# Patient Record
Sex: MALE | Race: WHITE | NOT HISPANIC OR LATINO | Employment: OTHER | URBAN - METROPOLITAN AREA
[De-identification: names, ages, dates, MRNs, and addresses within clinical notes are randomized per-mention and may not be internally consistent; named-entity substitution may affect disease eponyms.]

---

## 2018-01-24 ENCOUNTER — OFFICE VISIT CONVERTED (OUTPATIENT)
Dept: UROLOGY | Facility: CLINIC | Age: 68
End: 2018-01-24
Attending: UROLOGY

## 2018-01-24 ENCOUNTER — CONVERSION ENCOUNTER (OUTPATIENT)
Dept: SURGERY | Facility: CLINIC | Age: 68
End: 2018-01-24

## 2018-02-12 ENCOUNTER — OFFICE VISIT CONVERTED (OUTPATIENT)
Dept: ORTHOPEDIC SURGERY | Facility: CLINIC | Age: 68
End: 2018-02-12
Attending: ORTHOPAEDIC SURGERY

## 2019-01-28 ENCOUNTER — OFFICE VISIT CONVERTED (OUTPATIENT)
Dept: ORTHOPEDIC SURGERY | Facility: CLINIC | Age: 69
End: 2019-01-28
Attending: ORTHOPAEDIC SURGERY

## 2019-07-15 ENCOUNTER — OFFICE VISIT CONVERTED (OUTPATIENT)
Dept: SURGERY | Facility: CLINIC | Age: 69
End: 2019-07-15
Attending: SURGERY

## 2019-07-29 ENCOUNTER — OFFICE VISIT CONVERTED (OUTPATIENT)
Dept: ORTHOPEDIC SURGERY | Facility: CLINIC | Age: 69
End: 2019-07-29
Attending: ORTHOPAEDIC SURGERY

## 2019-09-09 ENCOUNTER — OFFICE VISIT CONVERTED (OUTPATIENT)
Dept: ORTHOPEDIC SURGERY | Facility: CLINIC | Age: 69
End: 2019-09-09
Attending: ORTHOPAEDIC SURGERY

## 2019-09-19 ENCOUNTER — HOSPITAL ENCOUNTER (OUTPATIENT)
Dept: PERIOP | Facility: HOSPITAL | Age: 69
Setting detail: HOSPITAL OUTPATIENT SURGERY
Discharge: HOME | End: 2019-09-19
Attending: ORTHOPAEDIC SURGERY

## 2019-09-19 LAB — GLUCOSE BLD-MCNC: 136 MG/DL (ref 70–99)

## 2019-10-02 ENCOUNTER — OFFICE VISIT CONVERTED (OUTPATIENT)
Dept: ORTHOPEDIC SURGERY | Facility: CLINIC | Age: 69
End: 2019-10-02

## 2019-10-30 ENCOUNTER — CONVERSION ENCOUNTER (OUTPATIENT)
Dept: ORTHOPEDIC SURGERY | Facility: CLINIC | Age: 69
End: 2019-10-30

## 2019-10-30 ENCOUNTER — OFFICE VISIT CONVERTED (OUTPATIENT)
Dept: ORTHOPEDIC SURGERY | Facility: CLINIC | Age: 69
End: 2019-10-30

## 2019-11-18 ENCOUNTER — HOSPITAL ENCOUNTER (OUTPATIENT)
Dept: URGENT CARE | Facility: CLINIC | Age: 69
Discharge: HOME | End: 2019-11-18
Attending: FAMILY MEDICINE

## 2019-11-20 ENCOUNTER — OFFICE VISIT CONVERTED (OUTPATIENT)
Dept: ORTHOPEDIC SURGERY | Facility: CLINIC | Age: 69
End: 2019-11-20
Attending: PHYSICIAN ASSISTANT

## 2019-11-20 ENCOUNTER — CONVERSION ENCOUNTER (OUTPATIENT)
Dept: ORTHOPEDIC SURGERY | Facility: CLINIC | Age: 69
End: 2019-11-20

## 2019-11-22 ENCOUNTER — HOSPITAL ENCOUNTER (OUTPATIENT)
Dept: GENERAL RADIOLOGY | Facility: HOSPITAL | Age: 69
Discharge: HOME | End: 2019-11-22
Attending: PHYSICIAN ASSISTANT

## 2019-11-27 ENCOUNTER — OFFICE VISIT CONVERTED (OUTPATIENT)
Dept: ORTHOPEDIC SURGERY | Facility: CLINIC | Age: 69
End: 2019-11-27
Attending: PHYSICIAN ASSISTANT

## 2019-11-27 ENCOUNTER — HOSPITAL ENCOUNTER (OUTPATIENT)
Dept: CARDIOLOGY | Facility: HOSPITAL | Age: 69
Discharge: HOME | End: 2019-11-27
Attending: PHYSICIAN ASSISTANT

## 2019-12-11 ENCOUNTER — OFFICE VISIT CONVERTED (OUTPATIENT)
Dept: ORTHOPEDIC SURGERY | Facility: CLINIC | Age: 69
End: 2019-12-11
Attending: PHYSICIAN ASSISTANT

## 2020-01-22 ENCOUNTER — OFFICE VISIT CONVERTED (OUTPATIENT)
Dept: ORTHOPEDIC SURGERY | Facility: CLINIC | Age: 70
End: 2020-01-22
Attending: PHYSICIAN ASSISTANT

## 2020-03-04 ENCOUNTER — OFFICE VISIT CONVERTED (OUTPATIENT)
Dept: ORTHOPEDIC SURGERY | Facility: CLINIC | Age: 70
End: 2020-03-04
Attending: PHYSICIAN ASSISTANT

## 2020-11-23 ENCOUNTER — OFFICE VISIT CONVERTED (OUTPATIENT)
Dept: ORTHOPEDIC SURGERY | Facility: CLINIC | Age: 70
End: 2020-11-23
Attending: PHYSICIAN ASSISTANT

## 2021-05-10 NOTE — H&P
History and Physical      Patient Name: Atilio Mcdermott   Patient ID: [id number]   Sex: Male   YOB: 1950    Primary Care Provider: Otis Patrick MD   Referring Provider: Otis Patrick MD    Visit Date: November 23, 2020    Provider: Dia Malave PA-C   Location: [facility]   Location Address: [address] [place] [address]  [id number]   Location Phone: [phone]          Chief Complaint  · Right knee injury      History Of Present Illness  Atilio Mcdermott is a 70 year old /White male who presents today to [facility].      He is here for evaluation of right knee. He is s/p right TKA 10-12 years ago. He states last week, he fell and landed directly on his right knee. He states pain over anterolateral aspect, worse with kneeling and flexion.       Past Medical History  Arthritis; Bladder Disorder; Chest pain; Congestive heart failure; Diabetes; High cholesterol; Limb Pain; Limb Swelling; Neurogenic bladder; Reflux; Shortness Of Air; Stroke; Tendonitis: Shoulder, Rotator Cuff, Right         Past Surgical History  *Metal Implant; Artificial Joints/Limbs; Colonoscopy; Cyst Excision; Gallbladder; Hernia; Joint Surgery; Knee replacement, left; Knee replacement, right; Tonsilectomy; Tonsillectomy; Wrist Surgery         Medication List  aspirin oral; baclofen oral; Claritin oral; Fish Oil oral; Folbee Plus oral; Lipitor oral; metformin 500 mg oral tablet; Micardis oral; multivitamin oral; Neurontin oral; Plavix oral; Protonix oral; Restoril oral; tramadol-acetaminophen 37.5-325 mg oral tablet         Allergy List  NO KNOWN DRUG ALLERGIES       Allergies Reconciled  Family Medical History  Stroke; Heart Disease; Diabetes, unspecified type; Family history of certain chronic disabling diseases; arthritis; Family history of Arthritis         Social History  Alcohol (Former); Alcohol Use (Never); Caffeine (Current every day); Claustophobic (Unknown); lives with spouse; .;  "Recreational Drug Use (Never); Retired.; Second hand smoke exposure (Never); Tobacco (Former)         Review of Systems  · Constitutional  o Denies  o : fever, chills, weight loss  · Cardiovascular  o Denies  o : chest pain, shortness of breath  · Gastrointestinal  o Denies  o : liver disease, heartburn, nausea, blood in stools  · Genitourinary  o Denies  o : painful urination, blood in urine  · Integument  o Denies  o : rash, itching  · Neurologic  o Denies  o : headache, weakness, loss of consciousness  · Musculoskeletal  o Admits  o : painful, swollen joints  · Psychiatric  o Denies  o : drug/alcohol addiction, anxiety, depression      Vitals  Date Time BP Position Site L\R Cuff Size HR RR TEMP (F) WT  HT  BMI kg/m2 BSA m2 O2 Sat FR L/min FiO2        11/23/2020 09:36 AM      70 - R   165lbs 0oz 5'  4\" 28.32 1.84 95 %            Physical Examination  · Constitutional  o Appearance  o : well developed, well-nourished, no obvious deformities present  · Head and Face  o Head  o :   § Inspection  § : normocephalic  o Face  o :   § Inspection  § : no facial lesions  · Eyes  o Conjunctivae  o : conjunctivae normal  o Sclerae  o : sclerae white  · Ears, Nose, Mouth and Throat  o Ears  o :   § External Ears  § : appearance within normal limits  § Hearing  § : intact  o Nose  o :   § External Nose  § : appearance normal  · Neck  o Inspection/Palpation  o : normal appearance  o Range of Motion  o : full range of motion  · Respiratory  o Respiratory Effort  o : breathing unlabored  o Inspection of Chest  o : normal appearance  o Auscultation of Lungs  o : no audible wheezing or rales  · Cardiovascular  o Heart  o : regular rate  · Gastrointestinal  o Abdominal Examination  o : soft and non-tender  · Skin and Subcutaneous Tissue  o General Inspection  o : intact, no rashes  · Psychiatric  o General  o : Alert and oriented x3  o Judgement and Insight  o : judgment and insight intact  o Mood and Affect  o : mood normal, " affect appropriate  · Right Knee  o Inspection  o : No discoloration. Small effusion of prepatellar bursa. Tender to palpation lateral border of patella. Full extension/flexion. Quadriceps and patellar tendon appear intact to palpation. Calf supple/nontender. Stable to varus/valgus stress. Neurovascularly intact.   · In Office Procedures  o View  o : AP/LATERAL/SUNRISE   o Site  o : right, knee  o Indication  o : Right knee pain   o Study  o : X-rays ordered, taken in the office, and reviewed today.  o Xray  o : Well aligned right TKA, no signs of loosening or periprosthetic fracture.   o Comparative Data  o : Comparative Data found and reviewed today           Assessment  · Pain: Knee     719.46/M25.569  · History of knee replacement     V43.65/Z96.659  · Knee contusion     924.11/S80.00XA      Plan  · Orders  o Knee (Right) [facility] Preferred View ([id number]) - 719.46/M25.569 - 11/23/2020  · Medications  o Medications have been Reconciled  o Transition of Care or Provider Policy  · Instructions  o Reviewed the patient's Past Medical, Social, and Family history as well as the ROS at today's visit, no changes.  o Call or return if worsening symptoms.  o Educated on ROM exercises to avoid stiffness and NSAIDs for swelling. Follow up PRN.  o Electronically Identified Patient Education Materials Provided Electronically            Electronically Signed by: Dia Malave PA-C -Author on November 23, 2020 10:03:10 AM

## 2021-05-14 VITALS — BODY MASS INDEX: 28.17 KG/M2 | OXYGEN SATURATION: 95 % | HEIGHT: 64 IN | HEART RATE: 69 BPM | WEIGHT: 165 LBS

## 2021-05-15 VITALS — WEIGHT: 170 LBS | BODY MASS INDEX: 29.02 KG/M2 | OXYGEN SATURATION: 97 % | HEART RATE: 78 BPM | HEIGHT: 64 IN

## 2021-05-15 VITALS — BODY MASS INDEX: 27.14 KG/M2 | HEIGHT: 64 IN | RESPIRATION RATE: 14 BRPM | WEIGHT: 159 LBS

## 2021-05-15 VITALS — WEIGHT: 170 LBS | BODY MASS INDEX: 29.02 KG/M2 | HEART RATE: 78 BPM | HEIGHT: 64 IN | OXYGEN SATURATION: 96 %

## 2021-05-15 VITALS — HEIGHT: 64 IN | BODY MASS INDEX: 29.19 KG/M2 | OXYGEN SATURATION: 95 % | HEART RATE: 62 BPM | WEIGHT: 171 LBS

## 2021-05-15 VITALS — OXYGEN SATURATION: 93 % | HEART RATE: 84 BPM | HEIGHT: 64 IN

## 2021-05-15 VITALS — HEIGHT: 64 IN | WEIGHT: 174.25 LBS | OXYGEN SATURATION: 98 % | BODY MASS INDEX: 29.75 KG/M2 | HEART RATE: 73 BPM

## 2021-05-15 VITALS — WEIGHT: 170 LBS | HEART RATE: 79 BPM | BODY MASS INDEX: 29.02 KG/M2 | OXYGEN SATURATION: 97 % | HEIGHT: 64 IN

## 2021-05-15 VITALS — BODY MASS INDEX: 29.19 KG/M2 | HEIGHT: 64 IN | HEART RATE: 65 BPM | OXYGEN SATURATION: 97 % | WEIGHT: 171 LBS

## 2021-05-15 VITALS — HEART RATE: 71 BPM | HEIGHT: 64 IN | OXYGEN SATURATION: 98 % | BODY MASS INDEX: 29.02 KG/M2 | WEIGHT: 170 LBS

## 2021-05-15 VITALS — WEIGHT: 170 LBS | HEIGHT: 64 IN | OXYGEN SATURATION: 97 % | BODY MASS INDEX: 29.02 KG/M2 | HEART RATE: 81 BPM

## 2021-05-16 VITALS — HEIGHT: 64 IN | BODY MASS INDEX: 31.58 KG/M2 | WEIGHT: 185 LBS | OXYGEN SATURATION: 95 % | HEART RATE: 74 BPM

## 2021-05-16 VITALS — WEIGHT: 170 LBS | HEIGHT: 64 IN | BODY MASS INDEX: 29.02 KG/M2 | OXYGEN SATURATION: 98 % | HEART RATE: 75 BPM

## 2021-05-16 VITALS
DIASTOLIC BLOOD PRESSURE: 72 MMHG | SYSTOLIC BLOOD PRESSURE: 116 MMHG | HEIGHT: 64 IN | BODY MASS INDEX: 30.73 KG/M2 | WEIGHT: 180 LBS

## 2021-08-09 ENCOUNTER — OFFICE VISIT (OUTPATIENT)
Dept: ORTHOPEDIC SURGERY | Facility: CLINIC | Age: 71
End: 2021-08-09

## 2021-08-09 VITALS — HEART RATE: 66 BPM | OXYGEN SATURATION: 96 % | BODY MASS INDEX: 26.98 KG/M2 | WEIGHT: 158 LBS | HEIGHT: 64 IN

## 2021-08-09 DIAGNOSIS — M16.11 PRIMARY OSTEOARTHRITIS OF RIGHT HIP: ICD-10-CM

## 2021-08-09 DIAGNOSIS — Z96.651 HISTORY OF RIGHT KNEE JOINT REPLACEMENT: Primary | ICD-10-CM

## 2021-08-09 PROCEDURE — 99212 OFFICE O/P EST SF 10 MIN: CPT | Performed by: PHYSICIAN ASSISTANT

## 2021-08-09 NOTE — PROGRESS NOTES
"Chief Complaint  Pain of the Right Hip and Pain of the Right Knee    Subjective          Atilio Olson presents to [facility] for evaluation of right hip and right knee pain. Patient states that around 3 weeks ago, he was cleaning out his horses shed when he got his boot stuck in a pallet. He states he went down and hit the ground. He states it happened so fast, he does not recall how he landed. He states the day after, he had pain in his quadriceps muscle. He is s/p right total knee arthroplasty in 2008 from external source. He states he has had knee pain and pain in his shin following his fall. He went to Urgent Care for this this past Friday and had imaging of his right knee and right hip. He states pain has became worse since the fall. He is currently taking tramadol on as needed basis due to history of issues following a stroke.     Objective   Vital Signs:   Pulse 66   Ht 162.6 cm (64\")   Wt 71.7 kg (158 lb)   SpO2 96%   BMI 27.12 kg/m²       Physical Exam  Constitutional:       Appearance: Normal appearance. He is well-developed and normal weight.   HENT:      Head: Normocephalic.      Right Ear: Hearing and external ear normal.      Left Ear: Hearing and external ear normal.      Nose: Nose normal.   Eyes:      Conjunctiva/sclera: Conjunctivae normal.   Cardiovascular:      Rate and Rhythm: Normal rate.   Pulmonary:      Effort: Pulmonary effort is normal.      Breath sounds: No wheezing or rales.   Abdominal:      Palpations: Abdomen is soft.      Tenderness: There is no abdominal tenderness.   Musculoskeletal:      Cervical back: Normal range of motion.   Skin:     Findings: No rash.   Neurological:      Mental Status: He is alert and oriented to person, place, and time.   Psychiatric:         Mood and Affect: Mood and affect normal.         Judgment: Judgment normal.     Ortho Exam  Right lower extremity: Gait is limping, full weightbearing.  No atrophy, deformity or " tenderness of the hip.  Full AROM of the hip and good strength, as compared to contralateral side.  No pain with windshield wiper test.  No pain with sit to stand transition.  Good muscle tone of the hip flexors, extensors, abductors and adductors.  Tenderness palpation of the lateral aspect of the knee.  Bruising on anterior aspect of knee.  Well-healed scar.  Tenderness along the IT band.  Sensation is intact.  Neurovascular intact.  Posterior tibialis pulse 2+.  Dorsalis pedis pulse 2+.  Good muscle tone of the quadriceps, hamstrings and ankle flexors.  Full AROM of the knee.  Well tracking patella.      Result Review :   The following data was reviewed by: VIBHA Albrecht on 08/09/2021:         Imaging Results (Most Recent)     None       XR Knee 3 View Right    Result Date: 8/5/2021  Narrative: PROCEDURE: XR KNEE 3 VW RIGHT  COMPARISON: [facility] , CR, KNEE 3 VIEWS RT, 1/28/2019, 10:33.  [facility], MR, LOWER EXTREMITY WO CONTRAST, 11/22/2019, 16:37.  INDICATIONS: Right knee pain x2 weeks after fall  FINDINGS:  A 3 compartment knee arthroplasty has been performed.  There is no evidence of loosening or infection.  Prosthetic components are in anatomic alignment.  No fracture or malalignment is evident.  There is a 2.2 cm somewhat ill-defined lytic focus in the proximal shaft of the tibia.  There is focal thinning of the anterior cortex at this site.  The finding was present on the 1/28/2019 exam suggesting a benign etiology, possibly postsurgical.  CONCLUSION:  1. Previous 3 compartment knee arthroplasty without evidence of complication 2. No joint effusion or loose body. 3. Chronic 2.2 cm lucent focus in the proximal tibial shaft is chronic and therefore favored to be benign.      Delonte Ordaz M.D.       Electronically Signed and Approved By: Delonte Ordaz M.D. on 8/05/2021 at 11:17             XR Hip With or Without Pelvis 2 - 3 View Right    Result Date:  8/5/2021  Narrative: PROCEDURE: XR HIP W OR WO PELVIS 2-3 VIEW RIGHT  COMPARISON: None  INDICATIONS: Right hip pain x2 weeks after fall.  FINDINGS:  No fracture or malalignment is identified.  Mild bilateral hip osteoarthritis is noted.  Soft tissues appear normal.  CONCLUSION:  1. Mild bilateral hip osteoarthritis      Delonte Ordaz M.D.       Electronically Signed and Approved By: Delonte Ordaz M.D. on 8/05/2021 at 11:07                      Assessment and Plan    Problem List Items Addressed This Visit        Musculoskeletal and Injuries    Primary osteoarthritis of right hip    History of right knee joint replacement - Primary          Follow Up   Return in about 5 weeks (around 9/13/2021).  Patient Instructions   Discussed treatment options with patient. Recommend use of Tylenol as needed, due to history of strokes.  IT band exercises given to assist with tenderness along the ITB.  Please call, should symptoms worsen or fail to improve prior to follow up.     Patient was given instructions and counseling regarding his condition or for health maintenance advice. Please see specific information pulled into the AVS if appropriate.

## 2021-08-09 NOTE — PATIENT INSTRUCTIONS
Discussed treatment options with patient. Recommend use of Tylenol as needed, due to history of strokes.  IT band exercises given to assist with tenderness along the ITB.  Please call, should symptoms worsen or fail to improve prior to follow up.

## 2021-09-01 ENCOUNTER — OFFICE VISIT (OUTPATIENT)
Dept: ORTHOPEDIC SURGERY | Facility: CLINIC | Age: 71
End: 2021-09-01

## 2021-09-01 VITALS — WEIGHT: 158 LBS | HEIGHT: 64 IN | BODY MASS INDEX: 26.98 KG/M2

## 2021-09-01 DIAGNOSIS — Z96.651 HISTORY OF RIGHT KNEE JOINT REPLACEMENT: Primary | ICD-10-CM

## 2021-09-01 DIAGNOSIS — M16.11 PRIMARY OSTEOARTHRITIS OF RIGHT HIP: ICD-10-CM

## 2021-09-01 PROCEDURE — 99213 OFFICE O/P EST LOW 20 MIN: CPT | Performed by: PHYSICIAN ASSISTANT

## 2021-09-01 NOTE — PROGRESS NOTES
"Chief Complaint  Follow-up of the Right Knee and Follow-up of the Right Leg    Subjective          Atilio Olson presents to [facility] for follow up of right lower extremity pain. Patient was last seen in clinic on 08/09/21 following fall while cleaning out his horses shed. He states he did not have pain initially after he fell, but happened shortly after. Patient takes tramadol daily and states it has not helped with his pain. He states pain is about the same or worse from his initial visit. He states pain is mostly in the thigh when walking and goes down to his ankle. Patient went to  prior to his initial visit and had imaging of his right hip and knee with no acute findings and intact right total knee arthroplasty. He is s/p right TKA in 2008 from external source. Patient denies pain radiating into toes. He denies numbness or tingling.     Objective   Vital Signs:   Ht 162.6 cm (64\")   Wt 71.7 kg (158 lb)   BMI 27.12 kg/m²       Physical Exam  Constitutional:       Appearance: Normal appearance. Patient is well-developed and normal weight.   HENT:      Head: Normocephalic.      Right Ear: Hearing and external ear normal.      Left Ear: Hearing and external ear normal.      Nose: Nose normal.   Eyes:      Conjunctiva/sclera: Conjunctivae normal.   Cardiovascular:      Rate and Rhythm: Normal rate.   Pulmonary:      Effort: Pulmonary effort is normal.      Breath sounds: No wheezing or rales.   Abdominal:      Palpations: Abdomen is soft.      Tenderness: There is no abdominal tenderness.   Musculoskeletal:      Cervical back: Normal range of motion.   Skin:     Findings: No rash.   Neurological:      Mental Status: Patient is alert and oriented to person, place, and time.   Psychiatric:         Mood and Affect: Mood and affect normal.         Judgment: Judgment normal.     Ortho Exam  Right lower extremity: Sensation is intact.  Neurovascular intact.  Posterior tibialis pulse " 2+.  Dorsalis pedis pulse 2+.  Full weightbearing with mildly limping gait.  Scars present on of the knee.  Tenderness palpation of the medial aspect of knee and along the quadriceps.  Full active range of motion of the hip with flexion, extension, abduction, IR/ER.  Pain elicited with abduction.  Active range of motion of the knee is -3 215 degrees of flexion.  Stable to varus and valgus stress.  Patella is well tracking.  Good muscle tone of the hip extensors, flexors, abductors, quadriceps and hamstrings muscles.  Normal plantar dorsiflexion.  Good muscle tone ankle flexors.    Result Review :   The following data was reviewed by: VIBHA Albrecht on 09/01/2021:         Imaging Results (Most Recent)     None                Assessment and Plan    Problem List Items Addressed This Visit        Musculoskeletal and Injuries    Primary osteoarthritis of right hip    Relevant Orders    Ambulatory Referral to Physical Therapy Evaluate and treat; Stretching, ROM, Strengthening (Completed)    History of right knee joint replacement - Primary    Relevant Orders    Ambulatory Referral to Physical Therapy Evaluate and treat; Stretching, ROM, Strengthening (Completed)          Follow Up   Return in about 6 weeks (around 10/13/2021).  Patient Instructions   Patient given prescription today for physical therapy for associated muscular / knee pain. Patient will continue use of Tramadol for pain.  Follow up in 6 weeks. Please call with any changes or concerns.     Patient was given instructions and counseling regarding his condition or for health maintenance advice. Please see specific information pulled into the AVS if appropriate.

## 2021-09-01 NOTE — PATIENT INSTRUCTIONS
Patient given prescription today for physical therapy for associated muscular / knee pain. Patient will continue use of Tramadol for pain.  Follow up in 6 weeks. Please call with any changes or concerns.

## 2021-09-20 ENCOUNTER — OFFICE VISIT (OUTPATIENT)
Dept: ORTHOPEDIC SURGERY | Facility: CLINIC | Age: 71
End: 2021-09-20

## 2021-09-20 VITALS — OXYGEN SATURATION: 93 % | HEART RATE: 89 BPM | WEIGHT: 158 LBS | HEIGHT: 64 IN | BODY MASS INDEX: 26.98 KG/M2

## 2021-09-20 DIAGNOSIS — Z96.651 HISTORY OF RIGHT KNEE JOINT REPLACEMENT: Primary | ICD-10-CM

## 2021-09-20 DIAGNOSIS — M16.11 PRIMARY OSTEOARTHRITIS OF RIGHT HIP: ICD-10-CM

## 2021-09-20 PROCEDURE — 99212 OFFICE O/P EST SF 10 MIN: CPT | Performed by: PHYSICIAN ASSISTANT

## 2021-09-20 NOTE — PROGRESS NOTES
"Chief Complaint  Pain of the Right Knee and Pain of the Right Hip    Subjective          Atilio Olson presents to [facility] for follow up of right hip and right knee pain. Patient has been following up in clinic since a fall while cleaning out his horse shed that occurred on 08/09/21. Patient has been attending physical therapy for the past three weeks. He states pain has improved some of hip and along his thigh. He states he still has constant ache along shin. Patient is s/p right TKA from external source in 2008. Note from therapy states that patient has radicular pain in RLE along with functional deficits with his ROM exercises. Patient does admit to numbness along his medial shin. He states pain is his biggest concern, mostly of the knee with weightbearing. He states his right quadriceps also tend to tighten up on him. He states modalities and exercises in his therapy has improved his pain some.     Objective   No Known Allergies    Vital Signs:   Pulse 89   Ht 162.6 cm (64\")   Wt 71.7 kg (158 lb)   SpO2 93%   BMI 27.12 kg/m²       Physical Exam  Constitutional:       Appearance: Normal appearance. Patient is well-developed and normal weight.   HENT:      Head: Normocephalic.      Right Ear: Hearing and external ear normal.      Left Ear: Hearing and external ear normal.      Nose: Nose normal.   Eyes:      Conjunctiva/sclera: Conjunctivae normal.   Cardiovascular:      Rate and Rhythm: Normal rate.   Pulmonary:      Effort: Pulmonary effort is normal.      Breath sounds: No wheezing or rales.   Abdominal:      Palpations: Abdomen is soft.      Tenderness: There is no abdominal tenderness.   Musculoskeletal:      Cervical back: Normal range of motion.   Skin:     Findings: No rash.   Neurological:      Mental Status: Patient is alert and oriented to person, place, and time.   Psychiatric:         Mood and Affect: Mood and affect normal.         Judgment: Judgment normal. "     Ortho Exam  Right lower extremity: Sensation grossly intact, neurovascular intact, posterior tibialis pulse 2+, dorsalis pedis pulse 2+.  Full weightbearing.  Mildly limping gait.  No tenderness, atrophy, swelling or discoloration of the hip.  Full passive range of motion of the knee.  Pain associated with internal rotation of the hip.  Scar present along the knee.  No atrophy, swelling, discoloration or deformity.  Tenderness on medial knee.  AROM is -3-125 degrees of flexion.  Stable to varus/valgus stress.  Good muscle tone of quadriceps, hamstrings, hip extensors, flexors and abductors.  Normal plantar and dorsiflexion.  Muscle tone of ankle flexors.  Negative straight leg raise bilaterally.    Result Review :   The following data was reviewed by: VIBHA Albrecht on 09/20/2021:         Imaging Results (Most Recent)     None                Assessment and Plan    Problem List Items Addressed This Visit        Musculoskeletal and Injuries    Primary osteoarthritis of right hip    Current Assessment & Plan     Discussed PT notes with patient in clinic today.  Patient stated pain had improved since beginning therapy.  Therapist believes that pain is associated with radiculopathy.  At this time, we will continue progression in physical therapy and reevaluate need for lumbar imaging and next visit.         History of right knee joint replacement - Primary          Follow Up   Return in about 4 weeks (around 10/18/2021).  Patient Instructions   Continue progressing function, stretching and ROM in physical therapy.   Continue with modalities in therapy.   Follow up in 4-6 weeks to reevaluate improvement.     Patient was given instructions and counseling regarding his condition or for health maintenance advice. Please see specific information pulled into the AVS if appropriate.

## 2021-09-20 NOTE — ASSESSMENT & PLAN NOTE
Discussed PT notes with patient in clinic today.  Patient stated pain had improved since beginning therapy.  Therapist believes that pain is associated with radiculopathy.  At this time, we will continue progression in physical therapy and reevaluate need for lumbar imaging and next visit.

## 2021-09-20 NOTE — PATIENT INSTRUCTIONS
Continue progressing function, stretching and ROM in physical therapy.   Continue with modalities in therapy.   Follow up in 4-6 weeks to reevaluate improvement.

## 2021-10-18 ENCOUNTER — OFFICE VISIT (OUTPATIENT)
Dept: ORTHOPEDIC SURGERY | Facility: CLINIC | Age: 71
End: 2021-10-18

## 2021-10-18 VITALS — HEART RATE: 65 BPM | OXYGEN SATURATION: 90 % | BODY MASS INDEX: 27.31 KG/M2 | HEIGHT: 64 IN | WEIGHT: 160 LBS

## 2021-10-18 DIAGNOSIS — M16.11 PRIMARY OSTEOARTHRITIS OF RIGHT HIP: ICD-10-CM

## 2021-10-18 DIAGNOSIS — Z96.651 HISTORY OF RIGHT KNEE JOINT REPLACEMENT: Primary | ICD-10-CM

## 2021-10-18 PROCEDURE — 99212 OFFICE O/P EST SF 10 MIN: CPT | Performed by: PHYSICIAN ASSISTANT

## 2021-10-18 NOTE — PATIENT INSTRUCTIONS
Patient will continue in PT, progressing strength and ROM exercises.   Continue with modalities as well.   We will follow up in 6-8 weeks to discuss further interventions if necessary.

## 2021-10-18 NOTE — PROGRESS NOTES
"Chief Complaint  Pain of the Right Knee and Pain of the Right Hip    Subjective          Atilio Olson presents to [facility] for follow-up of right hip and right knee pain.  Patient has history of right TKA from external source performed in 2008.  He presented initially in clinic on 08/09/2021, at which time he had pain of both his right hip and knee after he fell from horse shed.  He initially went to urgent care and had imaging.  He has been attending physical therapy over the course of the past several weeks.  He states over the past week he has noticed improvement in his pain.  He states that his therapist has been working a lot on his hip.  He states he still has aching of his knee, but is not constant.  He states he has some numbness from knee down his medial shin.  He denies numbness or tingling in the toes.    Objective   No Known Allergies    Vital Signs:   Pulse 65   Ht 162.6 cm (64\")   Wt 72.6 kg (160 lb)   SpO2 90%   BMI 27.46 kg/m²       Physical Exam  Constitutional:       Appearance: Normal appearance. Patient is well-developed and normal weight.   HENT:      Head: Normocephalic.      Right Ear: Hearing and external ear normal.      Left Ear: Hearing and external ear normal.      Nose: Nose normal.   Eyes:      Conjunctiva/sclera: Conjunctivae normal.   Cardiovascular:      Rate and Rhythm: Normal rate.   Pulmonary:      Effort: Pulmonary effort is normal.      Breath sounds: No wheezing or rales.   Abdominal:      Palpations: Abdomen is soft.      Tenderness: There is no abdominal tenderness.   Musculoskeletal:      Cervical back: Normal range of motion.   Skin:     Findings: No rash.   Neurological:      Mental Status: Patient is alert and oriented to person, place, and time.   Psychiatric:         Mood and Affect: Mood and affect normal.         Judgment: Judgment normal.     Ortho Exam  Right lower extremity: Sensation intact, neurovascular intact, dorsalis " pedis pulses 2+, posterior tibialis pulse 2+.  Full weightbearing, antalgic gait.  No tenderness palpation of the hip.  No atrophy, swelling or discoloration.  Well-healed scar along the knee.  Tenderness over medial proximal tibia.  AROM of the hip is full with extension, flexion, abduction and adduction.  Full internal and external rotation of the hip without pain.  Active range of motion of the knee is full extension, 120 degrees of flexion.  Stable to varus and valgus stress.  Well tracking patella.  Good muscle tone of the hip extensors, flexors, quadriceps and hamstrings muscles.  Full plantar dorsiflexion of the ankle.  Good muscle tone of the ankle flexors.    Result Review :{ Labs  Result Review  Imaging  Med Tab  Media :23}   The following data was reviewed by: VIBHA Albrecht on 10/18/2021:         Imaging Results (Most Recent)     None           PROCEDURE:  XR KNEE 3 VW RIGHT     COMPARISON: [facility] , CR, KNEE 3 VIEWS RT, 1/28/2019, 10:33.  [facility], MR, LOWER EXTREMITY WO CONTRAST, 11/22/2019, 16:37.     INDICATIONS:  Right knee pain x2 weeks after fall     FINDINGS:          A 3 compartment knee arthroplasty has been performed.  There is no evidence of loosening or   infection.  Prosthetic components are in anatomic alignment.  No fracture or malalignment is   evident.  There is a 2.2 cm somewhat ill-defined lytic focus in the proximal shaft of the tibia.    There is focal thinning of the anterior cortex at this site.  The finding was present on the   1/28/2019 exam suggesting a benign etiology, possibly postsurgical.     CONCLUSION:   1. Previous 3 compartment knee arthroplasty without evidence of complication  2. No joint effusion or loose body.  3. Chronic 2.2 cm lucent focus in the proximal tibial shaft is chronic and therefore favored to be   benign.          Assessment and Plan    Problem List Items Addressed This Visit        Musculoskeletal and  Injuries    Primary osteoarthritis of right hip    History of right knee joint replacement - Primary          Follow Up   Return in about 7 weeks (around 12/6/2021).  Patient Instructions   Patient will continue in PT, progressing strength and ROM exercises.   Continue with modalities as well.   We will follow up in 6-8 weeks to discuss further interventions if necessary.     Patient was given instructions and counseling regarding his condition or for health maintenance advice. Please see specific information pulled into the AVS if appropriate.

## 2021-12-06 ENCOUNTER — OFFICE VISIT (OUTPATIENT)
Dept: ORTHOPEDIC SURGERY | Facility: CLINIC | Age: 71
End: 2021-12-06

## 2021-12-06 VITALS — HEIGHT: 64 IN | BODY MASS INDEX: 27.31 KG/M2 | WEIGHT: 160 LBS

## 2021-12-06 DIAGNOSIS — M16.11 PRIMARY OSTEOARTHRITIS OF RIGHT HIP: ICD-10-CM

## 2021-12-06 DIAGNOSIS — Z96.651 HISTORY OF RIGHT KNEE JOINT REPLACEMENT: Primary | ICD-10-CM

## 2021-12-06 PROCEDURE — 99212 OFFICE O/P EST SF 10 MIN: CPT | Performed by: PHYSICIAN ASSISTANT

## 2021-12-06 NOTE — PROGRESS NOTES
"Chief Complaint  Follow-up of the Right Knee and Follow-up of the Right Hip    Subjective          Atilio Olson presents to [facility] for follow-up of the right hip and right knee pain.  Patient has history of right total knee arthroplasty from external source performed in 2008.  He began having pain of his right hip and right knee after a fall from a horse shed back in August.  Patient has been going to physical therapy over the past several weeks for pain and tightness of his IT band.  Patient states that his pain of his hip and knee \"left as fast as it came\".  He states he is no longer limping and no longer feels pain of his hip or knee.  Patient is pleased with his outcome.    Objective   No Known Allergies    Vital Signs:   Ht 162.6 cm (64\")   Wt 72.6 kg (160 lb)   BMI 27.46 kg/m²       Physical Exam  Constitutional:       Appearance: Normal appearance. Patient is well-developed and normal weight.   HENT:      Head: Normocephalic.      Right Ear: Hearing and external ear normal.      Left Ear: Hearing and external ear normal.      Nose: Nose normal.   Eyes:      Conjunctiva/sclera: Conjunctivae normal.   Cardiovascular:      Rate and Rhythm: Normal rate.   Pulmonary:      Effort: Pulmonary effort is normal.      Breath sounds: No wheezing or rales.   Abdominal:      Palpations: Abdomen is soft.      Tenderness: There is no abdominal tenderness.   Musculoskeletal:      Cervical back: Normal range of motion.   Skin:     Findings: No rash.   Neurological:      Mental Status: Patient is alert and oriented to person, place, and time.   Psychiatric:         Mood and Affect: Mood and affect normal.         Judgment: Judgment normal.     Ortho Exam  Right lower extremity: Sensation is intact, neurovascular intact, posterior tibialis pulse 2+, calf soft nontender.  Scar well-healed along the knee.  No tenderness, swelling, discoloration or deformity.  Full hip flexion, extension, " adduction and abduction.  Good muscle tone of the hip flexors, extensors, abductors and adductors.  Full knee flexion and extension.  Stable to varus and valgus stress.  Well tracking patella.  Full plantar and dorsiflexion of the ankle.  Fully weightbearing, gait is nonantalgic.      Result Review :   The following data was reviewed by: VIBHA Albrecht on 12/06/2021:         Imaging Results (Most Recent)     None                Assessment and Plan    Problem List Items Addressed This Visit        Musculoskeletal and Injuries    Primary osteoarthritis of right hip    History of right knee joint replacement - Primary          Follow Up   Return if symptoms worsen or fail to improve.  Patient Instructions   Patient completed PT and pain has improved.     He will follow up as needed and call with any changes or concerns.     Patient was given instructions and counseling regarding his condition or for health maintenance advice. Please see specific information pulled into the AVS if appropriate.

## 2021-12-06 NOTE — PATIENT INSTRUCTIONS
Patient completed PT and pain has improved.     He will follow up as needed and call with any changes or concerns.

## 2022-07-05 ENCOUNTER — TELEPHONE (OUTPATIENT)
Dept: SURGERY | Facility: CLINIC | Age: 72
End: 2022-07-05

## 2022-07-05 NOTE — TELEPHONE ENCOUNTER
Provider: RAMANDEEP BUI    Caller: TRISHA MILLSIN    Phone Number: [phone]    Reason for Call: PT WANTED TO KNOW IF IT'S TIME FOR HIM TO HAVE ANOTHER COLONOSCOPY. HE THINKS HE HAD ONE IN 2017 BUT I'M UNABLE TO VIEW THE RECORDS FROM THAT YEAR IN Epic. NO RECALL IN SYSTEM EITHER. PLEASE GIVE HIM A CALL BACK TO LET HIM KNOW IF IT'S TIME TO SCHEDULE COLONOSCOPY.    When was the patient last seen: 7/2019

## 2022-12-05 ENCOUNTER — HOSPITAL ENCOUNTER (OUTPATIENT)
Dept: GENERAL RADIOLOGY | Facility: HOSPITAL | Age: 72
Discharge: HOME | End: 2022-12-05

## 2022-12-05 ENCOUNTER — TRANSCRIBE ORDERS (OUTPATIENT)
Dept: ADMINISTRATIVE | Facility: HOSPITAL | Age: 72
End: 2022-12-05

## 2022-12-05 DIAGNOSIS — M53.86 DISORDER OF LUMBAR SPINE: ICD-10-CM

## 2022-12-05 DIAGNOSIS — M53.86 DISORDER OF LUMBAR SPINE: Primary | ICD-10-CM

## 2022-12-05 DIAGNOSIS — M51.36 DEGENERATIVE DISC DISEASE, LUMBAR: ICD-10-CM

## 2022-12-05 PROCEDURE — 72114 X-RAY EXAM L-S SPINE BENDING: CPT

## 2022-12-30 ENCOUNTER — APPOINTMENT (OUTPATIENT)
Dept: MRI IMAGING | Facility: HOSPITAL | Age: 72
End: 2022-12-30

## 2023-01-05 ENCOUNTER — HOSPITAL ENCOUNTER (OUTPATIENT)
Dept: MRI IMAGING | Facility: HOSPITAL | Age: 73
Discharge: HOME | End: 2023-01-05
Admitting: INTERNAL MEDICINE
Payer: MEDICARE

## 2023-01-05 DIAGNOSIS — M51.36 DEGENERATIVE DISC DISEASE, LUMBAR: ICD-10-CM

## 2023-01-05 PROCEDURE — 72148 MRI LUMBAR SPINE W/O DYE: CPT

## 2023-02-23 ENCOUNTER — OFFICE VISIT (OUTPATIENT)
Dept: NEUROSURGERY | Facility: CLINIC | Age: 73
End: 2023-02-23
Payer: MEDICARE

## 2023-02-23 VITALS
BODY MASS INDEX: 29.35 KG/M2 | WEIGHT: 171.9 LBS | DIASTOLIC BLOOD PRESSURE: 66 MMHG | HEIGHT: 64 IN | SYSTOLIC BLOOD PRESSURE: 136 MMHG

## 2023-02-23 DIAGNOSIS — M51.36 DDD (DEGENERATIVE DISC DISEASE), LUMBAR: ICD-10-CM

## 2023-02-23 DIAGNOSIS — M48.062 SPINAL STENOSIS OF LUMBAR REGION WITH NEUROGENIC CLAUDICATION: Primary | ICD-10-CM

## 2023-02-23 PROCEDURE — 99204 OFFICE O/P NEW MOD 45 MIN: CPT | Performed by: NEUROLOGICAL SURGERY

## 2023-02-23 NOTE — PROGRESS NOTES
"Chief Complaint  Back Pain    Subjective          Atilio Olson who is a 72 y.o. year old male who presents to [facility] for Evaluation of the Spine.     The patient complains of pain located in the lumbar spine.  Patients states the pain has been present for 6 months.  The pain came on gradually.  The pain scale level is 7.  The pain radiates into the bilateral legs and feet, left greater than right. The back is about equal to the leg.  The pain is waxing/waning and described as burning.  The pain is worse at nighttime. Patient states laying on his back and walking on uneven surfaces makes the pain worse.  Patient states hot baths makes the pain better.    Associated Symptoms Include: Numbness into the bilateral legs (intermittent)  Conservative Interventions Include: NSAIDs, muscle relaxants and pain medication    Was this the result of an injury or accident?: No    History of Previous Spinal Surgery?: No    This patient  reports that he has been smoking cigarettes. He has a 15.00 pack-year smoking history. He has never used smokeless tobacco.    Review of Systems   Musculoskeletal: Positive for arthralgias, back pain and myalgias.   Neurological: Positive for numbness.        Objective   Vital Signs:   /66 (BP Location: Right arm, Patient Position: Sitting)   Ht 162.6 cm (64\")   Wt 78 kg (171 lb 14.4 oz)   BMI 29.51 kg/m²       Physical Exam  Constitutional:       Appearance: He is normal weight.   Pulmonary:      Effort: Pulmonary effort is normal.   Musculoskeletal:         General: Tenderness (along posterior thighs) present.   Neurological:      Mental Status: He is alert.      Sensory: No sensory deficit.      Motor: No weakness.      Deep Tendon Reflexes: Reflexes normal.   Psychiatric:         Mood and Affect: Mood normal.            I personally reviewed the patient's MRI scan which shows multilevel degenerative changes with severe spinal stenosis at " L4-5.        Assessment and Plan    Diagnoses and all orders for this visit:    1. Spinal stenosis of lumbar region with neurogenic claudication (Primary)    2. DDD (degenerative disc disease), lumbar    He would like to proceed with left approach L4-5 minimally invasive laminectomy.    He will need to hold his Plavix for 7 days before and 2 days after.      Follow Up   No follow-ups on file.  Patient was given instructions and counseling regarding his condition or for health maintenance advice. Please see specific information pulled into the AVS if appropriate.

## 2023-02-24 ENCOUNTER — PATIENT ROUNDING (BHMG ONLY) (OUTPATIENT)
Dept: NEUROSURGERY | Facility: CLINIC | Age: 73
End: 2023-02-24
Payer: MEDICARE

## 2023-02-24 NOTE — PROGRESS NOTES
Mason, My name is Khadra     I am  The Practice Manager with [facility] and want to officially welcome you to our practice and ask about your recent visit.    Tell me about your visit with us. What things went well?         We're always looking for ways to make our patients' experiences even better. Do you have recommendations on ways we may improve?      Overall were you satisfied with your first visit to our practice?      I appreciate you taking the time to answer these question. Is there anything else I can do for you?     You may also receive a brief survey via e-mail or mail from [employer] about our patient satisfaction, if you could please take a moment and answer it would be helpful to the practice growth.    Thank you, and have a great day.     Khadra

## 2023-02-28 ENCOUNTER — TELEPHONE (OUTPATIENT)
Dept: NEUROSURGERY | Facility: CLINIC | Age: 73
End: 2023-02-28
Payer: MEDICARE

## 2023-03-09 PROBLEM — M48.062 SPINAL STENOSIS OF LUMBAR REGION WITH NEUROGENIC CLAUDICATION: Status: ACTIVE | Noted: 2023-03-09

## 2023-03-17 ENCOUNTER — TELEPHONE (OUTPATIENT)
Dept: NEUROSURGERY | Facility: CLINIC | Age: 73
End: 2023-03-17
Payer: MEDICARE

## 2023-03-17 RX ORDER — ASPIRIN 81 MG/1
81 TABLET ORAL DAILY
COMMUNITY

## 2023-03-17 RX ORDER — DICLOFENAC SODIUM 75 MG/1
75 TABLET, DELAYED RELEASE ORAL 2 TIMES DAILY
COMMUNITY

## 2023-03-17 RX ORDER — TRAMADOL HYDROCHLORIDE 50 MG/1
50 TABLET ORAL EVERY 8 HOURS PRN
COMMUNITY

## 2023-03-17 NOTE — PRE-PROCEDURE INSTRUCTIONS
IMPORTANT INSTRUCTIONS - PRE-ADMISSION TESTING  1. DO NOT EAT OR CHEW anything after midnight the night before your procedure.    2. You may have CLEAR liquids up to 2 hours prior to ARRIVAL time.   3. Take the following medications the morning of your procedure with JUST A SIP OF WATER: PANTOPRAZOLE, TRAMADOL, BACLOFEN, GABAPENTIN,  AND  LORATADINE    4. DO NOT BRING your medications to the hospital with you, UNLESS something has changed since your PRE-Admission Testing appointment.  5. Hold all vitamins, supplements, and NSAIDS (Non- steroidal anti-inflammatory meds) for one week prior to surgery (you MAY take Tylenol or Acetaminophen). LAST DOSE OF PLAVIX 03/16/23, B-COMPLEX-FOLIC ACID, DICLOFENAC AND FISH OIL. DR. HAMM'S OFFICE TO NOTIFY WHEN TO HOLD ASPIRIN  6. If you are diabetic, check your blood sugar the morning of your procedure. If it is less than 70 or if you are feeling symptomatic, call the following number for further instructions: [phone]_.  7. Use your inhalers/nebulizers as usual, the morning of your procedure. BRING YOUR INHALERS with you.   8. Bring your CPAP or BIPAP to hospital, ONLY IF YOU WILL BE SPENDING THE NIGHT.   9. Make sure you have a ride home and have someone who will stay with you the day of your procedure after you go home.  10. If you have any questions, please call your Pre-Admission Testing Nurse, _KAREN_ at [phone]_.   11. Per anesthesia request, do not smoke for 24 hours before your procedure or as instructed by your surgeon.

## 2023-03-17 NOTE — NURSING NOTE
NOTIFIED HAILEY (NORIS) THAT PATIENT IS CURRENTLY TAKING ASPIRIN 81 MG DAILY AND THIS WAS NOT ON HIS MED LIST FOR  THEIR OFFICE TO GIVE ANY INSTRUCTIONS. HAILEY SAID SHE WOULD SPEAK TO DR. HAMM AND NOTIFY PATIENT IF AND WHEN TO HOLD ASPIRIN PREOP.

## 2023-03-17 NOTE — TELEPHONE ENCOUNTER
with PAT called to advise that patient also takes ASA 81mg (not listed on medication list). We did receive clearance for patient to d/c plavix prior to surgery, but ASA was not addressed as we did not know patient was taking it at time of scheduling. Patient had last dose of ASA 3-16-23. Scheduled for one level [facility] 3-22-23. Ok to remain on? Or should he d/c as of today?

## 2023-03-22 ENCOUNTER — ANESTHESIA EVENT (OUTPATIENT)
Dept: PERIOP | Facility: HOSPITAL | Age: 73
End: 2023-03-22
Payer: MEDICARE

## 2023-03-22 ENCOUNTER — APPOINTMENT (OUTPATIENT)
Dept: GENERAL RADIOLOGY | Facility: HOSPITAL | Age: 73
End: 2023-03-22
Payer: MEDICARE

## 2023-03-22 ENCOUNTER — HOSPITAL ENCOUNTER (OUTPATIENT)
Facility: HOSPITAL | Age: 73
Setting detail: HOSPITAL OUTPATIENT SURGERY
Discharge: HOME | End: 2023-03-22
Attending: NEUROLOGICAL SURGERY | Admitting: NEUROLOGICAL SURGERY
Payer: MEDICARE

## 2023-03-22 ENCOUNTER — ANESTHESIA (OUTPATIENT)
Dept: PERIOP | Facility: HOSPITAL | Age: 73
End: 2023-03-22
Payer: MEDICARE

## 2023-03-22 VITALS
WEIGHT: 167.99 LBS | TEMPERATURE: 97.5 F | SYSTOLIC BLOOD PRESSURE: 112 MMHG | RESPIRATION RATE: 18 BRPM | HEIGHT: 63 IN | DIASTOLIC BLOOD PRESSURE: 56 MMHG | BODY MASS INDEX: 29.77 KG/M2 | HEART RATE: 99 BPM | OXYGEN SATURATION: 91 %

## 2023-03-22 DIAGNOSIS — M48.062 SPINAL STENOSIS OF LUMBAR REGION WITH NEUROGENIC CLAUDICATION: ICD-10-CM

## 2023-03-22 LAB
ANION GAP SERPL CALCULATED.3IONS-SCNC: 7.3 MMOL/L (ref 5–15)
BUN SERPL-MCNC: 20 MG/DL (ref 8–23)
BUN/CREAT SERPL: 23.8 (ref 7–25)
CALCIUM SPEC-SCNC: 9.7 MG/DL (ref 8.6–10.5)
CHLORIDE SERPL-SCNC: 110 MMOL/L (ref 98–107)
CO2 SERPL-SCNC: 25.7 MMOL/L (ref 22–29)
CREAT SERPL-MCNC: 0.84 MG/DL (ref 0.76–1.27)
EGFRCR SERPLBLD CKD-EPI 2021: 93.2 ML/MIN/1.73
GLUCOSE BLDC GLUCOMTR-MCNC: 156 MG/DL (ref 70–99)
GLUCOSE SERPL-MCNC: 151 MG/DL (ref 65–99)
POTASSIUM SERPL-SCNC: 4.4 MMOL/L (ref 3.5–5.2)
SODIUM SERPL-SCNC: 143 MMOL/L (ref 136–145)

## 2023-03-22 PROCEDURE — 25010000002 CEFAZOLIN IN DEXTROSE 2-4 GM/100ML-% SOLUTION: Performed by: NEUROLOGICAL SURGERY

## 2023-03-22 PROCEDURE — 99024 POSTOP FOLLOW-UP VISIT: CPT | Performed by: NEUROLOGICAL SURGERY

## 2023-03-22 PROCEDURE — 25010000002 DEXAMETHASONE PER 1 MG: Performed by: NURSE ANESTHETIST, CERTIFIED REGISTERED

## 2023-03-22 PROCEDURE — 63047 LAM FACETEC & FORAMOT LUMBAR: CPT | Performed by: NEUROLOGICAL SURGERY

## 2023-03-22 PROCEDURE — 76000 FLUOROSCOPY <1 HR PHYS/QHP: CPT

## 2023-03-22 PROCEDURE — 25010000002 MIDAZOLAM PER 1MG: Performed by: ANESTHESIOLOGY

## 2023-03-22 PROCEDURE — 25010000002 FENTANYL CITRATE (PF) 50 MCG/ML SOLUTION: Performed by: NURSE ANESTHETIST, CERTIFIED REGISTERED

## 2023-03-22 PROCEDURE — 71045 X-RAY EXAM CHEST 1 VIEW: CPT

## 2023-03-22 PROCEDURE — 80048 BASIC METABOLIC PNL TOTAL CA: CPT | Performed by: NEUROLOGICAL SURGERY

## 2023-03-22 PROCEDURE — 93005 ELECTROCARDIOGRAM TRACING: CPT | Performed by: NEUROLOGICAL SURGERY

## 2023-03-22 PROCEDURE — 25010000002 KETOROLAC TROMETHAMINE PER 15 MG: Performed by: NURSE ANESTHETIST, CERTIFIED REGISTERED

## 2023-03-22 PROCEDURE — 25010000002 ONDANSETRON PER 1 MG: Performed by: NURSE ANESTHETIST, CERTIFIED REGISTERED

## 2023-03-22 PROCEDURE — 25010000002 PROPOFOL 10 MG/ML EMULSION: Performed by: NURSE ANESTHETIST, CERTIFIED REGISTERED

## 2023-03-22 PROCEDURE — 82962 GLUCOSE BLOOD TEST: CPT

## 2023-03-22 RX ORDER — KETOROLAC TROMETHAMINE 30 MG/ML
INJECTION, SOLUTION INTRAMUSCULAR; INTRAVENOUS AS NEEDED
Status: DISCONTINUED | OUTPATIENT
Start: 2023-03-22 | End: 2023-03-22 | Stop reason: SURG

## 2023-03-22 RX ORDER — BUPIVACAINE HYDROCHLORIDE AND EPINEPHRINE 5; 5 MG/ML; UG/ML
INJECTION, SOLUTION EPIDURAL; INTRACAUDAL; PERINEURAL AS NEEDED
Status: DISCONTINUED | OUTPATIENT
Start: 2023-03-22 | End: 2023-03-22 | Stop reason: HOSPADM

## 2023-03-22 RX ORDER — EPHEDRINE SULFATE 50 MG/ML
INJECTION, SOLUTION INTRAVENOUS AS NEEDED
Status: DISCONTINUED | OUTPATIENT
Start: 2023-03-22 | End: 2023-03-22 | Stop reason: SURG

## 2023-03-22 RX ORDER — SODIUM CHLORIDE, SODIUM LACTATE, POTASSIUM CHLORIDE, CALCIUM CHLORIDE 600; 310; 30; 20 MG/100ML; MG/100ML; MG/100ML; MG/100ML
9 INJECTION, SOLUTION INTRAVENOUS CONTINUOUS PRN
Status: DISCONTINUED | OUTPATIENT
Start: 2023-03-22 | End: 2023-03-22 | Stop reason: HOSPADM

## 2023-03-22 RX ORDER — OXYCODONE HYDROCHLORIDE 5 MG/1
5 TABLET ORAL
Status: COMPLETED | OUTPATIENT
Start: 2023-03-22 | End: 2023-03-22

## 2023-03-22 RX ORDER — FENTANYL CITRATE 50 UG/ML
INJECTION, SOLUTION INTRAMUSCULAR; INTRAVENOUS AS NEEDED
Status: DISCONTINUED | OUTPATIENT
Start: 2023-03-22 | End: 2023-03-22 | Stop reason: SURG

## 2023-03-22 RX ORDER — PROMETHAZINE HYDROCHLORIDE 12.5 MG/1
25 TABLET ORAL ONCE AS NEEDED
Status: DISCONTINUED | OUTPATIENT
Start: 2023-03-22 | End: 2023-03-22 | Stop reason: HOSPADM

## 2023-03-22 RX ORDER — GLYCOPYRROLATE 0.2 MG/ML
0.2 INJECTION INTRAMUSCULAR; INTRAVENOUS
Status: COMPLETED | OUTPATIENT
Start: 2023-03-22 | End: 2023-03-22

## 2023-03-22 RX ORDER — ONDANSETRON 2 MG/ML
4 INJECTION INTRAMUSCULAR; INTRAVENOUS ONCE AS NEEDED
Status: DISCONTINUED | OUTPATIENT
Start: 2023-03-22 | End: 2023-03-22 | Stop reason: HOSPADM

## 2023-03-22 RX ORDER — PROPOFOL 10 MG/ML
VIAL (ML) INTRAVENOUS AS NEEDED
Status: DISCONTINUED | OUTPATIENT
Start: 2023-03-22 | End: 2023-03-22 | Stop reason: SURG

## 2023-03-22 RX ORDER — CEFAZOLIN SODIUM 2 G/100ML
2 INJECTION, SOLUTION INTRAVENOUS ONCE
Status: COMPLETED | OUTPATIENT
Start: 2023-03-22 | End: 2023-03-22

## 2023-03-22 RX ORDER — MIDAZOLAM HYDROCHLORIDE 2 MG/2ML
2 INJECTION, SOLUTION INTRAMUSCULAR; INTRAVENOUS ONCE
Status: COMPLETED | OUTPATIENT
Start: 2023-03-22 | End: 2023-03-22

## 2023-03-22 RX ORDER — OXYCODONE HYDROCHLORIDE AND ACETAMINOPHEN 5; 325 MG/1; MG/1
1 TABLET ORAL EVERY 4 HOURS PRN
Qty: 25 TABLET | Refills: 0 | Status: SHIPPED | OUTPATIENT
Start: 2023-03-22

## 2023-03-22 RX ORDER — ACETAMINOPHEN 500 MG
1000 TABLET ORAL ONCE
Status: COMPLETED | OUTPATIENT
Start: 2023-03-22 | End: 2023-03-22

## 2023-03-22 RX ORDER — KETAMINE HCL IN NACL, ISO-OSM 100MG/10ML
SYRINGE (ML) INJECTION AS NEEDED
Status: DISCONTINUED | OUTPATIENT
Start: 2023-03-22 | End: 2023-03-22 | Stop reason: SURG

## 2023-03-22 RX ORDER — ONDANSETRON 2 MG/ML
INJECTION INTRAMUSCULAR; INTRAVENOUS AS NEEDED
Status: DISCONTINUED | OUTPATIENT
Start: 2023-03-22 | End: 2023-03-22 | Stop reason: SURG

## 2023-03-22 RX ORDER — DEXAMETHASONE SODIUM PHOSPHATE 4 MG/ML
INJECTION, SOLUTION INTRA-ARTICULAR; INTRALESIONAL; INTRAMUSCULAR; INTRAVENOUS; SOFT TISSUE AS NEEDED
Status: DISCONTINUED | OUTPATIENT
Start: 2023-03-22 | End: 2023-03-22 | Stop reason: SURG

## 2023-03-22 RX ORDER — LIDOCAINE HYDROCHLORIDE 20 MG/ML
INJECTION, SOLUTION EPIDURAL; INFILTRATION; INTRACAUDAL; PERINEURAL AS NEEDED
Status: DISCONTINUED | OUTPATIENT
Start: 2023-03-22 | End: 2023-03-22 | Stop reason: SURG

## 2023-03-22 RX ORDER — MAGNESIUM HYDROXIDE 1200 MG/15ML
LIQUID ORAL AS NEEDED
Status: DISCONTINUED | OUTPATIENT
Start: 2023-03-22 | End: 2023-03-22 | Stop reason: HOSPADM

## 2023-03-22 RX ORDER — PROMETHAZINE HYDROCHLORIDE 25 MG/1
25 SUPPOSITORY RECTAL ONCE AS NEEDED
Status: DISCONTINUED | OUTPATIENT
Start: 2023-03-22 | End: 2023-03-22 | Stop reason: HOSPADM

## 2023-03-22 RX ORDER — PHENYLEPHRINE HCL IN 0.9% NACL 1 MG/10 ML
SYRINGE (ML) INTRAVENOUS AS NEEDED
Status: DISCONTINUED | OUTPATIENT
Start: 2023-03-22 | End: 2023-03-22 | Stop reason: SURG

## 2023-03-22 RX ORDER — ROCURONIUM BROMIDE 10 MG/ML
INJECTION, SOLUTION INTRAVENOUS AS NEEDED
Status: DISCONTINUED | OUTPATIENT
Start: 2023-03-22 | End: 2023-03-22 | Stop reason: SURG

## 2023-03-22 RX ORDER — MEPERIDINE HYDROCHLORIDE 25 MG/ML
12.5 INJECTION INTRAMUSCULAR; INTRAVENOUS; SUBCUTANEOUS
Status: DISCONTINUED | OUTPATIENT
Start: 2023-03-22 | End: 2023-03-22 | Stop reason: HOSPADM

## 2023-03-22 RX ADMIN — FENTANYL CITRATE 50 MCG: 50 INJECTION, SOLUTION INTRAMUSCULAR; INTRAVENOUS at 09:16

## 2023-03-22 RX ADMIN — EPHEDRINE SULFATE 10 MG: 50 INJECTION INTRAVENOUS at 09:53

## 2023-03-22 RX ADMIN — LIDOCAINE HYDROCHLORIDE 100 MG: 20 INJECTION, SOLUTION EPIDURAL; INFILTRATION; INTRACAUDAL; PERINEURAL at 09:16

## 2023-03-22 RX ADMIN — ACETAMINOPHEN 1000 MG: 500 TABLET ORAL at 07:44

## 2023-03-22 RX ADMIN — MIDAZOLAM HYDROCHLORIDE 2 MG: 1 INJECTION, SOLUTION INTRAMUSCULAR; INTRAVENOUS at 08:42

## 2023-03-22 RX ADMIN — EPHEDRINE SULFATE 10 MG: 50 INJECTION INTRAVENOUS at 10:03

## 2023-03-22 RX ADMIN — CEFAZOLIN SODIUM 2 G: 2 INJECTION, SOLUTION INTRAVENOUS at 09:19

## 2023-03-22 RX ADMIN — Medication 25 MG: at 09:25

## 2023-03-22 RX ADMIN — ROCURONIUM BROMIDE 50 MG: 10 INJECTION, SOLUTION INTRAVENOUS at 09:16

## 2023-03-22 RX ADMIN — ONDANSETRON 4 MG: 2 INJECTION INTRAMUSCULAR; INTRAVENOUS at 10:20

## 2023-03-22 RX ADMIN — Medication 200 MCG: at 09:31

## 2023-03-22 RX ADMIN — SODIUM CHLORIDE, POTASSIUM CHLORIDE, SODIUM LACTATE AND CALCIUM CHLORIDE 9 ML/HR: 600; 310; 30; 20 INJECTION, SOLUTION INTRAVENOUS at 07:43

## 2023-03-22 RX ADMIN — Medication 200 MCG: at 09:51

## 2023-03-22 RX ADMIN — OXYCODONE HYDROCHLORIDE 5 MG: 5 TABLET ORAL at 13:34

## 2023-03-22 RX ADMIN — OXYCODONE HYDROCHLORIDE 5 MG: 5 TABLET ORAL at 12:53

## 2023-03-22 RX ADMIN — GLYCOPYRROLATE 0.2 MG: 0.2 INJECTION INTRAMUSCULAR; INTRAVENOUS at 08:42

## 2023-03-22 RX ADMIN — SODIUM CHLORIDE, POTASSIUM CHLORIDE, SODIUM LACTATE AND CALCIUM CHLORIDE: 600; 310; 30; 20 INJECTION, SOLUTION INTRAVENOUS at 09:51

## 2023-03-22 RX ADMIN — SODIUM CHLORIDE, POTASSIUM CHLORIDE, SODIUM LACTATE AND CALCIUM CHLORIDE 9 ML/HR: 600; 310; 30; 20 INJECTION, SOLUTION INTRAVENOUS at 11:49

## 2023-03-22 RX ADMIN — KETOROLAC TROMETHAMINE 15 MG: 30 INJECTION, SOLUTION INTRAMUSCULAR; INTRAVENOUS at 10:21

## 2023-03-22 RX ADMIN — Medication 200 MCG: at 09:41

## 2023-03-22 RX ADMIN — Medication 100 MCG: at 10:03

## 2023-03-22 RX ADMIN — PROPOFOL 140 MG: 10 INJECTION, EMULSION INTRAVENOUS at 09:16

## 2023-03-22 RX ADMIN — SUGAMMADEX 200 MG: 100 INJECTION, SOLUTION INTRAVENOUS at 10:22

## 2023-03-22 RX ADMIN — DEXAMETHASONE SODIUM PHOSPHATE 4 MG: 4 INJECTION, SOLUTION INTRA-ARTICULAR; INTRALESIONAL; INTRAMUSCULAR; INTRAVENOUS; SOFT TISSUE at 09:19

## 2023-03-22 NOTE — ANESTHESIA POSTPROCEDURE EVALUATION
Patient: Atilio Olson    Procedure Summary     Date: 03/22/23 Room / Location: formerly [facility] OR 05 / formerly [facility] MAIN OR    Anesthesia Start: 0909 Anesthesia Stop: 1041    Procedure: MINIMALLY INVASIVE LUMBAR LAMINECTOMY, left approach, lumbar 4-lumbar 5 (Left: Spine Lumbar) Diagnosis:       Spinal stenosis of lumbar region with neurogenic claudication      (Spinal stenosis of lumbar region with neurogenic claudication [M48.062])    Surgeons: Ajit Staley MD Provider: Sheldon Neal MD    Anesthesia Type: general ASA Status: 3          Anesthesia Type: general    Vitals  Vitals Value Taken Time   /61 03/22/23 1046   Temp 36.1 °C (97 °F) 03/22/23 1040   Pulse 97 03/22/23 1050   Resp 14 03/22/23 1040   SpO2 90 % 03/22/23 1050   Vitals shown include unvalidated device data.        Post Anesthesia Care and Evaluation    Patient location during evaluation: bedside  Patient participation: complete - patient participated  Level of consciousness: awake  Pain score: 2  Pain management: adequate    Airway patency: patent  PONV Status: none  Cardiovascular status: acceptable and stable  Respiratory status: acceptable  Hydration status: acceptable    Comments: An Anesthesiologist personally participated in the most demanding procedures (including induction and emergence if applicable) in the anesthesia plan, monitored the course of anesthesia administration at frequent intervals and remained physically present and available for immediate diagnosis and treatment of emergencies.

## 2023-03-22 NOTE — PROGRESS NOTES
Pt reported postop weakness and numbness upon waking from anesthesia. Over the next 45 minutes or so, the numbness and strength improved. On last check, he was able to lift his legs off the bed and his sensation improved notably (some residual left leg numbness which he reported preoperatively). He denies perineal/genital numbness. Will continue to monitor. If strength doesn't continue to improve or further declines, would return to OR for wound exploration.

## 2023-03-22 NOTE — OP NOTE
LUMBAR DISCECTOMY POSTERIOR WITH METRIX  Procedure Report    Patient Name:  Atilio Olson  YOB: 1950    Date of Surgery:  3/22/2023     Indications: Lumbar spinal stenosis with neurogenic claudication    Pre-op Diagnosis:   Spinal stenosis of lumbar region with neurogenic claudication [M48.062]       Post-Op Diagnosis Codes:     * Spinal stenosis of lumbar region with neurogenic claudication [M48.062]    Procedure/CPT® Codes:  61578, 24622    Procedure(s):  MINIMALLY INVASIVE LUMBAR LAMINECTOMY, left approach, lumbar 4-lumbar 5    Staff:  Surgeon(s):  Ajit Staley MD    Assistant: Mishel Eid    Anesthesia: General    Estimated Blood Loss: 30 mL      Specimen:          Lamina and ligament (none to pathology)        Findings: Significant facet arthritis with lateral recess stenosis    Complications: No intraoperative complications    Description of Procedure: After informed consent was obtained, the patient was brought to the operating room. After the induction of adequate general endotracheal anesthesia, they were place in the prone position on the Morgan frame. All pressure points were padded. The back was prepped and draped in the typical fashion. A timeout was performed. The midline was marked and a line roughly 2.5 cms off the midline to the left was drawn. The C-arm and a spinal needle were used to localize the L4-5 level. A 2 cm incision was then made and the Boss tubular retractor system was used to dilate the tissue docking at L4-5. The level was confirmed with fluoroscopy. The microscope was brought in and used for the remainder of the case for improved magnification and illumination. The lamina was cleared of soft tissue and removed to above the insertion of the ligamentum flavum. The ligament was resected inferiorly and laterally to the edge of the nerve root.  The ligament was seen to be quite thickened and abnormal in appearance.  After undercutting the ligament and the medial  facet the ball probe passed freely along the left L5 nerve root.  The ligament was removed from its insertion L5 and the tubular retractor tilted medially to allow exposure of the patient's right lateral recess.  The dissector was used to dissect the ligament from the underlying dura and the 2 and 3 mm Kerrison punches were then used to undercut the medial facet on the patient's right.  After this the ball probe passed freely along the right L5 nerve root.  Hemostasis was obtained using combination of bipolar electrocautery as well as Gelfoam with thrombin.  Bone wax was used on the bone liters.  The wound was irrigated with normal saline.  The tubular retractor was removed and hemostasis assured in the soft tissue. The fascia was reapproximated using a 0 Vicryl and the the skin edges were reapproximated using a subcutaneous 2-0 Vicryl. The wound was dressed with mastisol, steri-strips, Telfa and Tegaderm. All sponge and needle counts were correct. The patient received a dose of preoperative antibiotics.     Assistant: Mishel Eid  was responsible for performing the following activities: Suction and Placing Dressing and their skilled assistance was necessary for the success of this case.    Ajit Staley MD     Date: 3/22/2023  Time: 10:54 EDT

## 2023-03-22 NOTE — ANESTHESIA PREPROCEDURE EVALUATION
Anesthesia Evaluation     Patient summary reviewed and Nursing notes reviewed   no history of anesthetic complications:  NPO Solid Status: > 8 hours  NPO Liquid Status: > 2 hours           Airway   Mallampati: II  TM distance: >3 FB  Neck ROM: full  No difficulty expected  Dental      Pulmonary - normal exam    breath sounds clear to auscultation  (+) a smoker Current,   Cardiovascular - normal exam  Exercise tolerance: good (4-7 METS)    ECG reviewed  Rhythm: regular  Rate: normal    (+) hypertension, CHF , DVT, hyperlipidemia,     ROS comment: EKG SR with PAC, RBBB    Off plavix 5 days    Neuro/Psych  (+) CVA (weak LEFT),    GI/Hepatic/Renal/Endo    (+)   diabetes mellitus,     Musculoskeletal     (+) back pain,   Abdominal    Substance History      OB/GYN          Other   arthritis,      ROS/Med Hx Other: >4METS, HX CVA X2 (LAST 2006) AND DVT RT ARM AT THAT TIME.   HX CHF, DM, HTN, HLD. LAST PCP OV 1/19/23.  KT                    Anesthesia Plan    ASA 3     general     (Patient understands anesthesia not responsible for dental damage.)  intravenous induction     Anesthetic plan, risks, benefits, and alternatives have been provided, discussed and informed consent has been obtained with: patient.    Plan discussed with CRNA.        CODE STATUS:

## 2023-03-22 NOTE — ADDENDUM NOTE
Addendum  created 03/22/23 1259 by Sheldon Neal MD    Order list changed, Pharmacy for encounter modified

## 2023-03-22 NOTE — DISCHARGE INSTRUCTIONS
DISCHARGE INSTRUCTIONS  DISCECTOMY/ LAMINECTOMY  [x] MINIMALLY INVASIVE      For your surgery you had:  General anesthesia (you may have a sore throat for the first 24 hours)  You may experience dizziness, drowsiness, or light-headedness for several hours following surgery  Do not stay alone today or tonight.  Limit your activity for 24 hours.  You should not drive, operate machinery, drink alcohol, or sign legally binding documents for 24 hours or while you are taking pain medication.    Activity  For the first two weeks following surgery, remain close to home and do not do any lifting, bending or strenuous activity.  Walking is the best exercise and you can increase the amount you walk as you feel like it.  Riding in a car for short distances (15-20 minutes) is okay but do not drive until you are off all narcotic medications.  If you have a sedentary job, you may resume work as soon as you feel like it (this is rarely less than one week).    Pain Control  Take your pain medicines as needed and as prescribed.  As you are feeling better, you can decrease the prescribed pain medication and take over-the-counter medications such as Tylenol or Ibuprofen.  The prescribed pain medicines tend to be constipating so it is important to eat a well-balanced diet and take a stool softener if recommended by the doctor.  Activity such as walking also helps keep your bowels regular.    Last dose of pain medication was given at:    Oxy 5 mg x2 doses @ 12:53 and 1:34   Toradol last at 10:20am. May take ibuprofen next at 4:20pm if able and needed. Incision Care  Your sutures are under the skin and will dissolve in time.  You may shower as soon as you like.    [x] You have a clear dressing, which you should remove in 3-4 days.  Beneath this dressing are steri-strips that will peel off over time.  If these steri-strips have not peeled off in 10-14 days, you may remove them.  Gently washing your incision with mild soap and rinsing  with water during your shower is all you need to do to care for the incision.  Do not scrub the incision or sit in the bathtub.  Check your incision site each day to see how it looks.  Some redness and bruising is normal for the first few days.    NOTIFY YOUR DOCTOR IF YOU EXPERIENCE ANY OF THE FOLLOWING:   You have a fever over 100.8o Fahrenheit orally  Shaking chills  Your incision is red, hot to touch, or has excessive drainage  Your incision starts to separate  Increase in bleeding or bleeding that is excessive  Nausea, vomiting and/or pain not controlled by prescribed medications  Unable to urinate in 6 hours after surgery  You may contact 's clinic at [phone] with any questions or concerns.  If unable to reach your doctor, please go to the closest emergency room.    SPECIAL INSTRUCTIONS:  1) No driving for 5-7 days and not taking narcotics.   2) May shower tomorrow no submerging incision.   3) Do not lift / push / pull more than 8-10 lbs.   4) Minimize bending/twisting at the waist and jarring activity such as lawnmower.

## 2023-04-04 LAB — QT INTERVAL: 412 MS

## 2023-04-18 ENCOUNTER — OFFICE VISIT (OUTPATIENT)
Dept: NEUROSURGERY | Facility: CLINIC | Age: 73
End: 2023-04-18
Payer: MEDICARE

## 2023-04-18 DIAGNOSIS — Z98.890 S/P LUMBAR LAMINECTOMY: Primary | ICD-10-CM

## 2023-04-18 PROCEDURE — 99024 POSTOP FOLLOW-UP VISIT: CPT | Performed by: NEUROLOGICAL SURGERY

## 2023-04-18 PROCEDURE — 1160F RVW MEDS BY RX/DR IN RCRD: CPT | Performed by: NEUROLOGICAL SURGERY

## 2023-04-18 PROCEDURE — 1159F MED LIST DOCD IN RCRD: CPT | Performed by: NEUROLOGICAL SURGERY

## 2023-04-18 NOTE — PROGRESS NOTES
Atilio Olson is a 72 y.o. male that presents with Post-op       The back is overall doing well. Decreased pain into the posterior thighs. He has left foot pain which was present preop. He is planning to have foot surgery once cleared.       Review of Systems     There were no vitals filed for this visit.     Physical Exam  Musculoskeletal:      Comments: WHSS   Neurological:      Mental Status: He is alert.             Assessment and Plan {CC Problem List  Visit Diagnosis  ROS  Review (Popup)  Health Maintenance  Quality  BestPractice  Medications  SmartSets  SnapShot Encounters  Media :23}   Problem List Items Addressed This Visit    None  Visit Diagnoses     S/P lumbar laminectomy    -  Primary        He will increase his activity as tolerated.    He will f/u with any new leg pain. I have encouraged core strengthening for the lower back pain.   Follow Up {Instructions Charge Capture  Follow-up Communications :23}   Return if symptoms worsen or fail to improve.

## 2023-04-24 ENCOUNTER — TRANSCRIBE ORDERS (OUTPATIENT)
Dept: ADMINISTRATIVE | Facility: HOSPITAL | Age: 73
End: 2023-04-24
Payer: MEDICARE

## 2023-04-24 ENCOUNTER — HOSPITAL ENCOUNTER (OUTPATIENT)
Dept: GENERAL RADIOLOGY | Facility: HOSPITAL | Age: 73
Discharge: HOME | End: 2023-04-24
Admitting: INTERNAL MEDICINE
Payer: MEDICARE

## 2023-04-24 DIAGNOSIS — Z01.818 PRE-OP EVALUATION: ICD-10-CM

## 2023-04-24 DIAGNOSIS — Z01.818 PRE-OP EVALUATION: Primary | ICD-10-CM

## 2023-04-24 PROCEDURE — 71046 X-RAY EXAM CHEST 2 VIEWS: CPT

## 2023-04-25 ENCOUNTER — TRANSCRIBE ORDERS (OUTPATIENT)
Dept: ADMINISTRATIVE | Facility: HOSPITAL | Age: 73
End: 2023-04-25
Payer: MEDICARE

## 2023-04-25 ENCOUNTER — HOSPITAL ENCOUNTER (OUTPATIENT)
Dept: CARDIOLOGY | Facility: HOSPITAL | Age: 73
Discharge: HOME | End: 2023-04-25
Admitting: INTERNAL MEDICINE
Payer: MEDICARE

## 2023-04-25 DIAGNOSIS — Z01.818 PRE-OPERATIVE CLEARANCE: Primary | ICD-10-CM

## 2023-04-25 DIAGNOSIS — Z01.818 PRE-OPERATIVE CLEARANCE: ICD-10-CM

## 2023-04-25 LAB — QT INTERVAL: 406 MS

## 2023-04-25 PROCEDURE — 93005 ELECTROCARDIOGRAM TRACING: CPT | Performed by: INTERNAL MEDICINE

## 2023-05-16 LAB — QT INTERVAL: 406 MS

## 2023-11-20 ENCOUNTER — OFFICE VISIT (OUTPATIENT)
Dept: ORTHOPEDIC SURGERY | Facility: CLINIC | Age: 73
End: 2023-11-20
Payer: MEDICARE

## 2023-11-20 VITALS
HEIGHT: 63 IN | SYSTOLIC BLOOD PRESSURE: 139 MMHG | WEIGHT: 176.6 LBS | OXYGEN SATURATION: 92 % | DIASTOLIC BLOOD PRESSURE: 76 MMHG | BODY MASS INDEX: 31.29 KG/M2 | HEART RATE: 85 BPM

## 2023-11-20 DIAGNOSIS — M70.61 TROCHANTERIC BURSITIS OF RIGHT HIP: ICD-10-CM

## 2023-11-20 DIAGNOSIS — M25.551 BILATERAL HIP PAIN: Primary | ICD-10-CM

## 2023-11-20 DIAGNOSIS — M25.552 BILATERAL HIP PAIN: Primary | ICD-10-CM

## 2023-11-20 DIAGNOSIS — M70.62 TROCHANTERIC BURSITIS OF LEFT HIP: ICD-10-CM

## 2023-11-20 PROCEDURE — 99203 OFFICE O/P NEW LOW 30 MIN: CPT | Performed by: ORTHOPAEDIC SURGERY

## 2023-11-20 PROCEDURE — 20610 DRAIN/INJ JOINT/BURSA W/O US: CPT | Performed by: ORTHOPAEDIC SURGERY

## 2023-11-20 RX ORDER — LIDOCAINE HYDROCHLORIDE 10 MG/ML
5 INJECTION, SOLUTION INFILTRATION; PERINEURAL
Status: COMPLETED | OUTPATIENT
Start: 2023-11-20 | End: 2023-11-20

## 2023-11-20 RX ORDER — TRIAMCINOLONE ACETONIDE 40 MG/ML
40 INJECTION, SUSPENSION INTRA-ARTICULAR; INTRAMUSCULAR
Status: COMPLETED | OUTPATIENT
Start: 2023-11-20 | End: 2023-11-20

## 2023-11-20 RX ADMIN — LIDOCAINE HYDROCHLORIDE 5 ML: 10 INJECTION, SOLUTION INFILTRATION; PERINEURAL at 15:16

## 2023-11-20 RX ADMIN — TRIAMCINOLONE ACETONIDE 40 MG: 40 INJECTION, SUSPENSION INTRA-ARTICULAR; INTRAMUSCULAR at 15:16

## 2023-11-20 NOTE — PROGRESS NOTES
"Chief Complaint  Initial Evaluation of the Left Hip and Initial Evaluation of the Right Hip     Subjective      Atilio Olson presents to [facility] for initial evaluation of bilateral hips. He has pain on the lateral aspect of the hip.  He has some groin pain. He had foot surgery and has been walking unevenly.  He has had no recent injury or fall.     No Known Allergies     Social History     Socioeconomic History    Marital status:    Tobacco Use    Smoking status: Some Days     Packs/day: 0.50     Years: 30.00     Additional pack years: 0.00     Total pack years: 15.00     Types: Cigarettes    Smokeless tobacco: Never    Tobacco comments:     Last was 2 weeks ago   Vaping Use    Vaping Use: Never used   Substance and Sexual Activity    Alcohol use: Not Currently     Comment: FORMER . DRINKS IN THE PAST     Drug use: Never    Sexual activity: Yes     Partners: Female        I reviewed the patient's chief complaint, history of present illness, review of systems, past medical history, surgical history, family history, social history, medications, and allergy list.     Review of Systems     Constitutional: Denies fevers, chills, weight loss  Cardiovascular: Denies chest pain, shortness of breath  Skin: Denies rashes, acute skin changes  Neurologic: Denies headache, loss of consciousness        Vital Signs:   /76 (BP Location: Right arm, Patient Position: Sitting, Cuff Size: Adult)   Pulse 85   Ht 160 cm (63\")   Wt 80.1 kg (176 lb 9.6 oz)   SpO2 92%   BMI 31.28 kg/m²          Physical Exam  General: Alert. No acute distress    Ortho Exam        BILATERAL HIPS No skin discoloration, atrophy or swelling. Positive EHL, FHL, GS, and TA. Sensation intact to all 5 nerves of the foot. Negative straight leg raise. Leg lengths appear equal. Ambulates with Antalgic gait Negative Emmett. Negative Satinder. Good strength to hamstrings, quadriceps, dorsiflexors, and plantar flexors. " Knee Extensor Mechanism  intact        Large Joint Arthrocentesis: R greater trochanteric bursa  Date/Time: 11/20/2023 3:16 PM  Consent given by: patient  Site marked: site marked  Timeout: Immediately prior to procedure a time out was called to verify the correct patient, procedure, equipment, support staff and site/side marked as required   Supporting Documentation  Indications: pain   Procedure Details  Location: hip - R greater trochanteric bursa  Needle size: 22 G  Medications administered: 5 mL lidocaine 1 %; 40 mg triamcinolone acetonide 40 MG/ML  Patient tolerance: patient tolerated the procedure well with no immediate complications      Large Joint Arthrocentesis: L greater trochanteric bursa  Date/Time: 11/20/2023 3:16 PM  Consent given by: patient  Site marked: site marked  Timeout: Immediately prior to procedure a time out was called to verify the correct patient, procedure, equipment, support staff and site/side marked as required   Supporting Documentation  Indications: pain   Procedure Details  Location: hip - L greater trochanteric bursa  Needle size: 22 G  Medications administered: 5 mL lidocaine 1 %; 40 mg triamcinolone acetonide 40 MG/ML  Patient tolerance: patient tolerated the procedure well with no immediate complications          Imaging Results (Most Recent)       Procedure Component Value Units Date/Time    XR Hips Bilateral With or Without Pelvis 3-4 View [[id number]] Resulted: 11/20/23 1450     Updated: 11/20/23 1500             Result Review :     X-Ray Report:  Bilateral hip X-Ray  Indication: Evaluation of the Bilateral hips  AP/Lateral view(s)  Findings: No arthritis.  No fracture or dislocation.   Prior studies available for comparison: no             Assessment and Plan     Diagnoses and all orders for this visit:    1. Bilateral hip pain (Primary)  -     XR Hips Bilateral With or Without Pelvis 3-4 View    2. Trochanteric bursitis of left hip    3. Trochanteric bursitis of right  hip        Discussed the treatment plan with the patient. I reviewed the X-rays that were obtained today with the patient.     Discussed the risks and benefits of conservative measures. The patient expressed understanding and wished to proceed with bilateral hip steroid injections.  He tolerated the injections well.     HEP exercises    Educated on risk of smoking/nicotine. Discussed options for smoking cessation. and Call or return if worsening symptoms.    Follow Up     PRN      Patient was given instructions and counseling regarding his condition or for health maintenance advice. Please see specific information pulled into the AVS if appropriate.     Scribed for Scotty Duran MD by Nereida Nicole MA.  11/20/23   14:45 EST    I have personally performed the services described in this document as scribed by the above individual and it is both accurate and complete. Scotty Duran MD 11/20/23

## 2023-11-30 ENCOUNTER — OFFICE VISIT (OUTPATIENT)
Dept: UROLOGY | Facility: CLINIC | Age: 73
End: 2023-11-30
Payer: MEDICARE

## 2023-11-30 VITALS
WEIGHT: 176 LBS | SYSTOLIC BLOOD PRESSURE: 149 MMHG | BODY MASS INDEX: 31.18 KG/M2 | DIASTOLIC BLOOD PRESSURE: 78 MMHG | HEIGHT: 63 IN

## 2023-11-30 DIAGNOSIS — N43.3 HYDROCELE, UNSPECIFIED HYDROCELE TYPE: Primary | ICD-10-CM

## 2023-11-30 DIAGNOSIS — R10.31 RIGHT INGUINAL PAIN: ICD-10-CM

## 2023-11-30 DIAGNOSIS — N50.811 PAIN IN RIGHT TESTICLE: ICD-10-CM

## 2023-11-30 DIAGNOSIS — N50.3 EPIDIDYMAL CYST: ICD-10-CM

## 2023-11-30 PROCEDURE — 99203 OFFICE O/P NEW LOW 30 MIN: CPT | Performed by: UROLOGY

## 2023-11-30 NOTE — PROGRESS NOTES
UROLOGY OFFICE H&P NOTE    Subjective   HPI  Atilio Olson is a 73 y.o. male.  Presents for evaluation of hydrocele.  Previously seen by Dr. Bonilla 6/2021 for history of neurogenic bladder.    The patient has been experiencing constant pain in his right testicle since 08/2023 or 09/2023. He had an ultrasound done, which showed an epididymal cyst measuring approximately 2.2 cm. The pain is not as intense as it was prior to the ultrasound.     The patient notes the pain is worse on the right side. His pain is worse when he lays down. He notes that the pain wakes him up at night. The patient is a stomach sleeper. He has been using ice packs.     The patient denies any major urinary complaints or symptoms.    The patient has suffered 2 strokes in the past with the last one in 2016.    The patient had surgery to the left foot reconstruction with pinning due to arthritis.  His right foot needs a similar procedure.  The patient was diagnosed with bursitis of the bilateral hips and received a steroid injection from Scotty Duran MD.      ____________  Scrotal ultrasound 10/30/2023: Right epididymal cyst 2.1 x 2 x 2.2 cm; moderate bilateral hydroceles    Medical History:  Past Medical History:   Diagnosis Date    Abnormal ECG [id number]    Acid reflux     Arthritis     Arthritis of back     Bladder disorder     Cervical disc disorder     Claustrophobia     Congestive heart failure (CHF)     UNSURE OF LAST EPISODE, NO CURRENT ISSUES, FOLL[facility]    Deep vein thrombosis 2006    right arm    Diabetes     Fracture, foot     High cholesterol     Hypertension     Limb swelling     Low back pain     Lumbosacral disc disease     Neurogenic bladder 01/18/2017    Periarthritis of shoulder     Peripheral neuropathy     Stroke     x2, last episode 2006, left side slight weakness    Tear of meniscus of knee     Tendonitis of shoulder 02/19/2018    ROTATOR CUFF RIGHT         Social History:  Social History      Socioeconomic History    Marital status:    Tobacco Use    Smoking status: Some Days     Packs/day: 0.50     Years: 30.00     Additional pack years: 0.00     Total pack years: 15.00     Types: Cigarettes    Smokeless tobacco: Never    Tobacco comments:     Last was 2 weeks ago   Vaping Use    Vaping Use: Never used   Substance and Sexual Activity    Alcohol use: Not Currently     Comment: FORMER . DRINKS IN THE PAST     Drug use: Never    Sexual activity: Yes     Partners: Female        Family History:  Family History   Problem Relation Age of Onset    Arthritis Mother     Heart disease Mother     Diabetes Mother     Stroke Father     Heart disease Father     Diabetes Father     Arthritis Father     Stroke Sister     Diabetes Sister     Diabetes Son     Malig Hyperthermia Neg Hx         Surgical History:  Past Surgical History:   Procedure Laterality Date    BACK SURGERY      COLONOSCOPY      EXCISION      CYST EXCISION    FOOT SURGERY      GALLBLADDER SURGERY      HAND SURGERY      HERNIA REPAIR      JOINT REPLACEMENT      LUMBAR DISCECTOMY Left 03/22/2023    Procedure: MINIMALLY INVASIVE LUMBAR LAMINECTOMY, left approach, lumbar 4-lumbar 5;  Surgeon: Ajit Staley MD;  Location: [facility] MAIN OR;  Service: Neurosurgery;  Laterality: Left;    OTHER SURGICAL HISTORY      METAL IMPLANT    OTHER SURGICAL HISTORY      ARTIFICAL JOINTS/LIMBS    OTHER SURGICAL HISTORY      JOINT SURGERY    REPLACEMENT TOTAL KNEE Left     REPLACEMENT TOTAL KNEE Right     X2    SHOULDER SURGERY      TONSILLECTOMY      WRIST SURGERY          Allergies:  No Known Allergies     Current Medications:  Current Outpatient Medications   Medication Sig Dispense Refill    aspirin 81 MG EC tablet Take 1 tablet by mouth Daily. DR. STALEY'S OFFICE ([facility]) TO NOTIFY PATIENT  IF AND WHEN TO HOLD MED PREOP.      atorvastatin (LIPITOR) 10 MG tablet Take 1 tablet by mouth Every Night.      B Complex-C-Folic Acid (FOLBEE PLUS PO)       baclofen  "(LIORESAL) 10 MG tablet Take 1 tablet by mouth 3 (Three) Times a Day.      clopidogrel (PLAVIX) 75 MG tablet Take 1 tablet by mouth Daily. Last dose 03/16/23  Indications: Resume on 3/24/23      diclofenac (VOLTAREN) 75 MG EC tablet Take 1 tablet by mouth 2 (Two) Times a Day.      gabapentin (NEURONTIN) 800 MG tablet Take 1 tablet by mouth 3 (Three) Times a Day.      Loratadine 10 MG capsule Take 1 capsule by mouth Daily.      losartan-HCTZ (HYZAAR) 100-12.5 combo dose Take 1 dose by mouth Daily.      metFORMIN (GLUCOPHAGE) 500 MG tablet metformin 500 mg oral tablet take 1 tablet (500 mg) by oral route 2 times per day with morning and evening meals   Active      Omega-3 Fatty Acids (fish oil) 1000 MG capsule capsule       pantoprazole (PROTONIX) 20 MG EC tablet Take 2 tablets by mouth Daily.      temazepam (RESTORIL) 15 MG capsule 1 capsule At Night As Needed.      traMADol (ULTRAM) 50 MG tablet Take 1 tablet by mouth Every 8 (Eight) Hours As Needed for Moderate Pain.       No current facility-administered medications for this visit.       Review of systems  A review of systems was performed, and positive findings are noted in the HPI.    Objective     Vital Signs:   /78   Ht 160 cm (63\")   Wt 79.8 kg (176 lb)   BMI 31.18 kg/m²       Physical exam  No acute distress, well-nourished  Awake alert and oriented  Mood normal; affect normal  Abdomen soft, nondistended, nontender, obese with suprapubic pannus  : No penile lesions; no scrotal erythema or edema; no appreciable hydrocele bilaterally, descended testis of normal size, shape, consistency; palpable right epididymal cyst which patient tolerant to manipulation; evaluation for inguinal hernia limited secondary to body habitus      Problem List:  Patient Active Problem List   Diagnosis    Primary osteoarthritis of right hip    History of right knee joint replacement       Assessment & Plan   Diagnoses and all orders for this visit:    1. Hydrocele, " unspecified hydrocele type (Primary)  -     CT Pelvis With Contrast; Future    2. Epididymal cyst  -     CT Pelvis With Contrast; Future    3. Right inguinal pain  -     CT Pelvis With Contrast; Future    4. Pain in right testicle  -     CT Pelvis With Contrast; Future      1. Right testicular pain.  - We discussed the results of his ultrasound.  Also discussed findings on physical examination.  Patient without clinically significant hydroceles not appreciable on physical exam; patient with small epididymal cyst, generally tolerant to exam, uncertain but believed to be unlikely to be source of patient's scrotal and testicular complaints    - We will obtain a CT scan of the pelvis to rule out any hernias and a further assessment of his anatomy.  - I do not see any reason from my standpoint that I would recommend any type of surgery at this point, pending CT scan result    - I would recommend potentially trying to think about more supportive underwear.  Conservative management strategies, these were discussed at length  - He could consider using a cold pack or ice pack when he is laying down at night 20 minutes on and 20 minutes off.  - He can take anti-inflammatories like Advil or Tylenol.    Follow-up in 4 to 6 weeks.  CT scan prior    All questions addressed    Transcribed from ambient dictation for Rupinder Hankins MD by Rosemary Huff.  11/30/23   14:09 EST    Patient or patient representative verbalized consent to the visit recording.  I have personally performed the services described in this document as transcribed by the above individual, and it is both accurate and complete.

## 2023-12-06 ENCOUNTER — TELEPHONE (OUTPATIENT)
Dept: UROLOGY | Facility: CLINIC | Age: 73
End: 2023-12-06
Payer: MEDICARE

## 2023-12-06 NOTE — TELEPHONE ENCOUNTER
Scheduled apt for 12/14/23 at 930am at [facility] encourage clear liquid patient is aware

## 2023-12-06 NOTE — TELEPHONE ENCOUNTER
PATIENT HAS A CT PELVIS WITH CONTRAST ORDERED BY DR. YEH.    HE WANTS TO HAVE IT AT [facility].  HE SAID HE CALLED THEM, AND THEY DON'T HAVE AN ORDER.    HE SAID IS DOUBLED OVER IN PAIN, AND IT IS GETTING WORSE EVERY DAY.  IT IS PAIN IN HIS TESTICLES ON THE RIGHT SIDE.  HE NEEDS TO HAVE THE CT OR HE IS GOING TO HAVE TO GO TO THE HOSPITAL.

## 2023-12-18 DIAGNOSIS — R10.31 RIGHT INGUINAL PAIN: ICD-10-CM

## 2023-12-18 DIAGNOSIS — N50.811 PAIN IN RIGHT TESTICLE: ICD-10-CM

## 2023-12-18 DIAGNOSIS — N43.3 HYDROCELE, UNSPECIFIED HYDROCELE TYPE: ICD-10-CM

## 2023-12-18 DIAGNOSIS — N50.3 EPIDIDYMAL CYST: ICD-10-CM

## 2024-01-03 PROBLEM — I63.9 STROKE: Status: ACTIVE | Noted: 2024-01-03

## 2024-01-03 PROBLEM — M79.89 LIMB SWELLING: Status: ACTIVE | Noted: 2024-01-03

## 2024-01-03 PROBLEM — M67.919 DISORDER OF BURSAE AND TENDONS IN SHOULDER REGION: Status: ACTIVE | Noted: 2018-02-19

## 2024-01-03 PROBLEM — I50.9 CONGESTIVE HEART FAILURE: Status: ACTIVE | Noted: 2024-01-03

## 2024-01-03 PROBLEM — R07.9 CHEST PAIN: Status: ACTIVE | Noted: 2024-01-03

## 2024-01-03 PROBLEM — E11.9 DIABETES: Status: ACTIVE | Noted: 2024-01-03

## 2024-01-03 PROBLEM — N32.9 BLADDER DISORDER: Status: ACTIVE | Noted: 2024-01-03

## 2024-01-03 PROBLEM — I10 HYPERTENSION: Status: ACTIVE | Noted: 2024-01-03

## 2024-01-03 PROBLEM — K21.9 ESOPHAGEAL REFLUX: Status: ACTIVE | Noted: 2024-01-03

## 2024-01-03 PROBLEM — E78.00 HIGH CHOLESTEROL: Status: ACTIVE | Noted: 2024-01-03

## 2024-01-03 PROBLEM — M71.9 DISORDER OF BURSAE AND TENDONS IN SHOULDER REGION: Status: ACTIVE | Noted: 2018-02-19

## 2024-01-03 PROBLEM — N31.9 NEUROGENIC BLADDER: Status: ACTIVE | Noted: 2017-01-18

## 2024-01-03 PROBLEM — M19.90 ARTHRITIS: Status: ACTIVE | Noted: 2024-01-03

## 2024-01-10 ENCOUNTER — OFFICE VISIT (OUTPATIENT)
Dept: UROLOGY | Facility: CLINIC | Age: 74
End: 2024-01-10
Payer: MEDICARE

## 2024-01-10 VITALS
WEIGHT: 169.6 LBS | RESPIRATION RATE: 16 BRPM | HEART RATE: 85 BPM | HEIGHT: 64 IN | BODY MASS INDEX: 28.95 KG/M2 | DIASTOLIC BLOOD PRESSURE: 67 MMHG | SYSTOLIC BLOOD PRESSURE: 109 MMHG

## 2024-01-10 DIAGNOSIS — N50.811 PAIN IN RIGHT TESTICLE: Primary | ICD-10-CM

## 2024-01-10 DIAGNOSIS — R10.31 RIGHT INGUINAL PAIN: ICD-10-CM

## 2024-01-10 DIAGNOSIS — R31.0 GROSS HEMATURIA: ICD-10-CM

## 2024-01-10 LAB
BACTERIA UR QL AUTO: ABNORMAL /HPF
BILIRUB BLD-MCNC: ABNORMAL MG/DL
BILIRUB UR QL STRIP: NEGATIVE
CLARITY UR: CLEAR
CLARITY, POC: CLEAR
COLOR UR: ABNORMAL
COLOR UR: ABNORMAL
EXPIRATION DATE: ABNORMAL
GLUCOSE UR STRIP-MCNC: NEGATIVE MG/DL
GLUCOSE UR STRIP-MCNC: NEGATIVE MG/DL
HGB UR QL STRIP.AUTO: NEGATIVE
HYALINE CASTS UR QL AUTO: ABNORMAL /LPF
KETONES UR QL STRIP: ABNORMAL
KETONES UR QL: ABNORMAL
LEUKOCYTE EST, POC: NEGATIVE
LEUKOCYTE ESTERASE UR QL STRIP.AUTO: ABNORMAL
Lab: ABNORMAL
NITRITE UR QL STRIP: NEGATIVE
NITRITE UR-MCNC: NEGATIVE MG/ML
PH UR STRIP.AUTO: 5.5 [PH] (ref 5–8)
PH UR: 5.5 [PH] (ref 5–8)
PROT UR QL STRIP: ABNORMAL
PROT UR STRIP-MCNC: ABNORMAL MG/DL
RBC # UR STRIP: ABNORMAL /HPF
RBC # UR STRIP: NEGATIVE /UL
REF LAB TEST METHOD: ABNORMAL
SP GR UR STRIP: 1.03 (ref 1–1.03)
SP GR UR: 1.02 (ref 1–1.03)
SQUAMOUS #/AREA URNS HPF: ABNORMAL /HPF
UROBILINOGEN UR QL STRIP: ABNORMAL
UROBILINOGEN UR QL: NORMAL
WBC # UR STRIP: ABNORMAL /HPF

## 2024-01-10 PROCEDURE — 81001 URINALYSIS AUTO W/SCOPE: CPT | Performed by: UROLOGY

## 2024-01-10 NOTE — PROGRESS NOTES
"    UROLOGY OFFICE FOLLOW-UP NOTE    Subjective   HPI  Atilio Olson is a 73 y.o. male.  Presents for evaluation of hydrocele.  Previously seen by Dr. Bonilla 6/2021 for history of neurogenic bladder.    The patient has been experiencing constant pain in his right testicle since 08/2023 or 09/2023. He had an ultrasound done, which showed an epididymal cyst measuring approximately 2.2 cm. The pain is not as intense as it was prior to the ultrasound.     The patient notes the pain is worse on the right side. His pain is worse when he lays down. He notes that the pain wakes him up at night. The patient is a stomach sleeper. He has been using ice packs.     The patient denies any major urinary complaints or symptoms.    The patient has suffered 2 strokes in the past with the last one in 2016.    The patient had surgery to the left foot reconstruction with pinning due to arthritis.  His right foot needs a similar procedure.  The patient was diagnosed with bursitis of the bilateral hips and received a steroid injection from Scotty Duran MD.    Update 1/10/2024: Presents for follow-up of right testicular and inguinal pain, CT pelvis prior.  Today mentions that after his scrotal u/s had 4 days of bright red painless hematuria with clots which resolved; didn't think to mention it since it spontaneously resolved.   Has recurred for 1 day since last visit.   \"On and off\" smoker. Denies family h/o  malignancy.   States he is living with the pain; persistent pain on right side.   No h/o stones.          ____________  CT pelvis with contrast 12/18/2023 ([facility]): Small bilateral hydroceles with apparent right epididymal cyst and or spermatocele.  Small fat-containing left inguinal hernia.  Moderately advanced lower lumbar spine degenerative disc disease with moderate to severe spinal foraminal stenosis.    Scrotal ultrasound 10/30/2023: Right epididymal cyst 2.1 x 2 x 2.2 cm; moderate bilateral hydroceles    Review of " "systems  A review of systems was performed, and positive findings are noted in the HPI.    Objective     Vital Signs:   /67   Pulse 85   Resp 16   Ht 162.6 cm (64\")   Wt 76.9 kg (169 lb 9.6 oz)   BMI 29.11 kg/m²       Physical exam  No acute distress, well-nourished  Awake alert and oriented  Mood normal; affect normal  Abdomen soft, nondistended, nontender    Problem List:  Patient Active Problem List   Diagnosis    Primary osteoarthritis of right hip    History of right knee joint replacement    Esophageal reflux    Arthritis    Bladder disorder    Chest pain    Congestive heart failure    Diabetes    Disorder of bursae and tendons in shoulder region    High cholesterol    Hypertension    Limb swelling    Neurogenic bladder    Stroke       Assessment & Plan   Diagnoses and all orders for this visit:    1. Pain in right testicle (Primary)    2. Right inguinal pain    3. Gross hematuria  -     POC Urinalysis Dipstick, Automated  -     Urinalysis With Microscopic - Urine, Clean Catch; Future  -     Urinalysis With Microscopic - Urine, Clean Catch  -     Cystoscopy; Future  -     CT Abdomen Pelvis With & Without Contrast; Future      Pelvic CT scan imaging reviewed, demonstrated discussed with patient at length.  No right-sided findings to explain his ongoing right-sided complaints.  Could consider other nonneurologic etiology; encouraged follow-up with PCP.  Again discussed conservative management strategies, possible referral to urology in [place]; patient wishes to cope with the symptoms for now.    Gross hematuria, warrants dedicated hematuria workup with triple phase CT and lower tract evaluation via cystoscopy.  Patient aware that this workup is performed to assess for underlying malignancy.  Agreeable to workup    Schedule cystoscopy,  CT scan prior  All questions addressed  "

## 2024-01-16 ENCOUNTER — TELEPHONE (OUTPATIENT)
Dept: UROLOGY | Facility: CLINIC | Age: 74
End: 2024-01-16
Payer: MEDICARE

## 2024-01-16 DIAGNOSIS — R31.0 GROSS HEMATURIA: Primary | ICD-10-CM

## 2024-01-16 RX ORDER — DIAZEPAM 10 MG/1
10 TABLET ORAL ONCE
Qty: 1 TABLET | Refills: 0 | Status: SHIPPED | OUTPATIENT
Start: 2024-01-16 | End: 2024-01-16

## 2024-01-16 NOTE — TELEPHONE ENCOUNTER
Called spoke with patient and he stated that he would like [facility] imaging have  1/26/24 at 8 am scheduled will let dr clemente know about anxiety during exam.

## 2024-01-16 NOTE — TELEPHONE ENCOUNTER
PATIENT CALLED AND SAID NO ONE HAS CALLED HIM TO SCHEDULE HIS CT YET.    HE SAID HE IS SCHEDULED FOR A PROCEDURE IN FEBRUARY.  HE IS VERY NERVOUS AND SHAKING OVER IT.  HE WANTS TO KNOW IF HE WILL BE GIVEN OR PRESCRIBED SOMETHING TO MAKE HIM GROGGY.

## 2024-01-23 ENCOUNTER — TELEPHONE (OUTPATIENT)
Dept: UROLOGY | Facility: CLINIC | Age: 74
End: 2024-01-23
Payer: MEDICARE

## 2024-01-23 NOTE — TELEPHONE ENCOUNTER
Caller: ASTRID    Relationship: [facility]    Best call back number: [phone]    What orders are you requesting (i.e. lab or imaging): CT ABDOMEN W/WO    In what timeframe would the patient need to come in: BEFORE 01/26/2024    Where will you receive your lab/imaging services: [facility]    Additional notes: FAX:[phone]

## 2024-02-01 DIAGNOSIS — R31.0 GROSS HEMATURIA: ICD-10-CM

## 2024-02-02 ENCOUNTER — APPOINTMENT (OUTPATIENT)
Dept: CT IMAGING | Facility: HOSPITAL | Age: 74
End: 2024-02-02
Payer: MEDICARE

## 2024-02-02 ENCOUNTER — APPOINTMENT (OUTPATIENT)
Dept: GENERAL RADIOLOGY | Facility: HOSPITAL | Age: 74
End: 2024-02-02
Payer: MEDICARE

## 2024-02-02 ENCOUNTER — APPOINTMENT (OUTPATIENT)
Dept: CARDIOLOGY | Facility: HOSPITAL | Age: 74
End: 2024-02-02
Payer: MEDICARE

## 2024-02-02 ENCOUNTER — HOSPITAL ENCOUNTER (INPATIENT)
Facility: HOSPITAL | Age: 74
LOS: 1 days | Discharge: HOME | End: 2024-02-02
Attending: EMERGENCY MEDICINE | Admitting: STUDENT IN AN ORGANIZED HEALTH CARE EDUCATION/TRAINING PROGRAM
Payer: MEDICARE

## 2024-02-02 ENCOUNTER — READMISSION MANAGEMENT (OUTPATIENT)
Dept: CALL CENTER | Facility: HOSPITAL | Age: 74
End: 2024-02-02
Payer: MEDICARE

## 2024-02-02 VITALS
SYSTOLIC BLOOD PRESSURE: 123 MMHG | TEMPERATURE: 98.6 F | OXYGEN SATURATION: 93 % | BODY MASS INDEX: 29.73 KG/M2 | HEART RATE: 68 BPM | WEIGHT: 174.16 LBS | DIASTOLIC BLOOD PRESSURE: 56 MMHG | RESPIRATION RATE: 18 BRPM | HEIGHT: 64 IN

## 2024-02-02 DIAGNOSIS — R07.9 CHEST PAIN, UNSPECIFIED TYPE: Primary | ICD-10-CM

## 2024-02-02 DIAGNOSIS — I20.0 UNSTABLE ANGINA: ICD-10-CM

## 2024-02-02 PROBLEM — I21.4 NSTEMI (NON-ST ELEVATED MYOCARDIAL INFARCTION): Status: RESOLVED | Noted: 2024-02-02 | Resolved: 2024-02-02

## 2024-02-02 PROBLEM — I21.4 NSTEMI (NON-ST ELEVATED MYOCARDIAL INFARCTION): Status: ACTIVE | Noted: 2024-02-02

## 2024-02-02 LAB
ALBUMIN SERPL-MCNC: 4.1 G/DL (ref 3.5–5.2)
ALBUMIN/GLOB SERPL: 1.7 G/DL
ALP SERPL-CCNC: 130 U/L (ref 39–117)
ALT SERPL W P-5'-P-CCNC: 31 U/L (ref 1–41)
ANION GAP SERPL CALCULATED.3IONS-SCNC: 12.1 MMOL/L (ref 5–15)
APTT PPP: 27.4 SECONDS (ref 78–95.9)
APTT PPP: 27.4 SECONDS (ref 78–95.9)
APTT PPP: 59.6 SECONDS (ref 78–95.9)
AST SERPL-CCNC: 20 U/L (ref 1–40)
BASOPHILS # BLD AUTO: 0.11 10*3/MM3 (ref 0–0.2)
BASOPHILS NFR BLD AUTO: 1.2 % (ref 0–1.5)
BH CV ECHO MEAS - AO MAX PG: 10.3 MMHG
BH CV ECHO MEAS - AO MEAN PG: 4.5 MMHG
BH CV ECHO MEAS - AO V2 MAX: 160.2 CM/SEC
BH CV ECHO MEAS - AO V2 VTI: 33.7 CM
BH CV ECHO MEAS - AVA(I,D): 1.71 CM2
BH CV ECHO MEAS - EDV(CUBED): 89.8 ML
BH CV ECHO MEAS - EDV(MOD-SP2): 91.5 ML
BH CV ECHO MEAS - EDV(MOD-SP4): 39.1 ML
BH CV ECHO MEAS - EF(MOD-SP2): 70.5 %
BH CV ECHO MEAS - EF(MOD-SP4): 65 %
BH CV ECHO MEAS - ESV(CUBED): 11.2 ML
BH CV ECHO MEAS - ESV(MOD-SP2): 27 ML
BH CV ECHO MEAS - ESV(MOD-SP4): 13.7 ML
BH CV ECHO MEAS - FS: 50 %
BH CV ECHO MEAS - IVS/LVPW: 0.98 CM
BH CV ECHO MEAS - IVSD: 0.9 CM
BH CV ECHO MEAS - LAT PEAK E' VEL: 14.8 CM/SEC
BH CV ECHO MEAS - LV DIASTOLIC VOL/BSA (35-75): 21.2 CM2
BH CV ECHO MEAS - LV MASS(C)D: 133.7 GRAMS
BH CV ECHO MEAS - LV MAX PG: 4.7 MMHG
BH CV ECHO MEAS - LV MEAN PG: 2.7 MMHG
BH CV ECHO MEAS - LV SYSTOLIC VOL/BSA (12-30): 7.4 CM2
BH CV ECHO MEAS - LV V1 MAX: 108 CM/SEC
BH CV ECHO MEAS - LV V1 VTI: 23.1 CM
BH CV ECHO MEAS - LVIDD: 4.5 CM
BH CV ECHO MEAS - LVIDS: 2.24 CM
BH CV ECHO MEAS - LVOT AREA: 2.49 CM2
BH CV ECHO MEAS - LVOT DIAM: 1.78 CM
BH CV ECHO MEAS - LVPWD: 0.92 CM
BH CV ECHO MEAS - MED PEAK E' VEL: 8.5 CM/SEC
BH CV ECHO MEAS - MV A MAX VEL: 92.4 CM/SEC
BH CV ECHO MEAS - MV DEC SLOPE: 261.6 CM/SEC2
BH CV ECHO MEAS - MV DEC TIME: 0.27 SEC
BH CV ECHO MEAS - MV E MAX VEL: 69.2 CM/SEC
BH CV ECHO MEAS - MV E/A: 0.75
BH CV ECHO MEAS - PA V2 MAX: 135.7 CM/SEC
BH CV ECHO MEAS - RVDD: 4.9 CM
BH CV ECHO MEAS - SI(MOD-SP2): 35 ML/M2
BH CV ECHO MEAS - SI(MOD-SP4): 13.8 ML/M2
BH CV ECHO MEAS - SV(LVOT): 57.6 ML
BH CV ECHO MEAS - SV(MOD-SP2): 64.5 ML
BH CV ECHO MEAS - SV(MOD-SP4): 25.4 ML
BH CV ECHO MEAS - TAPSE (>1.6): 2.8 CM
BH CV ECHO MEAS - TR MAX PG: 23.4 MMHG
BH CV ECHO MEAS - TR MAX VEL: 241.6 CM/SEC
BH CV ECHO MEASUREMENTS AVERAGE E/E' RATIO: 5.94
BILIRUB SERPL-MCNC: 0.3 MG/DL (ref 0–1.2)
BUN SERPL-MCNC: 21 MG/DL (ref 8–23)
BUN/CREAT SERPL: 17.6 (ref 7–25)
CALCIUM SPEC-SCNC: 9.8 MG/DL (ref 8.6–10.5)
CHLORIDE SERPL-SCNC: 103 MMOL/L (ref 98–107)
CK SERPL-CCNC: 67 U/L (ref 20–200)
CO2 SERPL-SCNC: 24.9 MMOL/L (ref 22–29)
CREAT SERPL-MCNC: 1.19 MG/DL (ref 0.76–1.27)
D DIMER PPP FEU-MCNC: 0.64 MCGFEU/ML (ref 0–0.72)
DEPRECATED RDW RBC AUTO: 42.7 FL (ref 37–54)
EGFRCR SERPLBLD CKD-EPI 2021: 64.9 ML/MIN/1.73
EOSINOPHIL # BLD AUTO: 0.44 10*3/MM3 (ref 0–0.4)
EOSINOPHIL NFR BLD AUTO: 4.7 % (ref 0.3–6.2)
ERYTHROCYTE [DISTWIDTH] IN BLOOD BY AUTOMATED COUNT: 13.2 % (ref 12.3–15.4)
GEN 5 2HR TROPONIN T REFLEX: 27 NG/L
GLOBULIN UR ELPH-MCNC: 2.4 GM/DL
GLUCOSE BLDC GLUCOMTR-MCNC: 133 MG/DL (ref 70–99)
GLUCOSE BLDC GLUCOMTR-MCNC: 137 MG/DL (ref 70–99)
GLUCOSE SERPL-MCNC: 125 MG/DL (ref 65–99)
HCT VFR BLD AUTO: 38.9 % (ref 37.5–51)
HGB BLD-MCNC: 13 G/DL (ref 13–17.7)
HOLD SPECIMEN: NORMAL
HOLD SPECIMEN: NORMAL
IMM GRANULOCYTES # BLD AUTO: 0.02 10*3/MM3 (ref 0–0.05)
IMM GRANULOCYTES NFR BLD AUTO: 0.2 % (ref 0–0.5)
INR PPP: 0.93 (ref 0.86–1.15)
INR PPP: 0.99 (ref 0.86–1.15)
LEFT ATRIUM VOLUME INDEX: 17.1 ML/M2
LIPASE SERPL-CCNC: 127 U/L (ref 13–60)
LYMPHOCYTES # BLD AUTO: 2.32 10*3/MM3 (ref 0.7–3.1)
LYMPHOCYTES NFR BLD AUTO: 24.8 % (ref 19.6–45.3)
MAGNESIUM SERPL-MCNC: 2.2 MG/DL (ref 1.6–2.4)
MCH RBC QN AUTO: 29.3 PG (ref 26.6–33)
MCHC RBC AUTO-ENTMCNC: 33.4 G/DL (ref 31.5–35.7)
MCV RBC AUTO: 87.8 FL (ref 79–97)
MONOCYTES # BLD AUTO: 0.8 10*3/MM3 (ref 0.1–0.9)
MONOCYTES NFR BLD AUTO: 8.5 % (ref 5–12)
NEUTROPHILS NFR BLD AUTO: 5.67 10*3/MM3 (ref 1.7–7)
NEUTROPHILS NFR BLD AUTO: 60.6 % (ref 42.7–76)
NRBC BLD AUTO-RTO: 0 /100 WBC (ref 0–0.2)
NT-PROBNP SERPL-MCNC: <36 PG/ML (ref 0–900)
PLATELET # BLD AUTO: 332 10*3/MM3 (ref 140–450)
PMV BLD AUTO: 9.3 FL (ref 6–12)
POTASSIUM SERPL-SCNC: 4.1 MMOL/L (ref 3.5–5.2)
PROT SERPL-MCNC: 6.5 G/DL (ref 6–8.5)
PROTHROMBIN TIME: 12.6 SECONDS (ref 11.8–14.9)
PROTHROMBIN TIME: 13.3 SECONDS (ref 11.8–14.9)
RBC # BLD AUTO: 4.43 10*6/MM3 (ref 4.14–5.8)
SODIUM SERPL-SCNC: 140 MMOL/L (ref 136–145)
TROPONIN T DELTA: -2 NG/L
TROPONIN T SERPL HS-MCNC: 29 NG/L
WBC NRBC COR # BLD AUTO: 9.36 10*3/MM3 (ref 3.4–10.8)
WHOLE BLOOD HOLD COAG: NORMAL
WHOLE BLOOD HOLD SPECIMEN: NORMAL

## 2024-02-02 PROCEDURE — 85379 FIBRIN DEGRADATION QUANT: CPT | Performed by: INTERNAL MEDICINE

## 2024-02-02 PROCEDURE — 99152 MOD SED SAME PHYS/QHP 5/>YRS: CPT | Performed by: INTERNAL MEDICINE

## 2024-02-02 PROCEDURE — 93010 ELECTROCARDIOGRAM REPORT: CPT | Performed by: INTERNAL MEDICINE

## 2024-02-02 PROCEDURE — 84484 ASSAY OF TROPONIN QUANT: CPT | Performed by: EMERGENCY MEDICINE

## 2024-02-02 PROCEDURE — 71045 X-RAY EXAM CHEST 1 VIEW: CPT

## 2024-02-02 PROCEDURE — 99153 MOD SED SAME PHYS/QHP EA: CPT | Performed by: INTERNAL MEDICINE

## 2024-02-02 PROCEDURE — 4A023N7 MEASUREMENT OF CARDIAC SAMPLING AND PRESSURE, LEFT HEART, PERCUTANEOUS APPROACH: ICD-10-PCS | Performed by: INTERNAL MEDICINE

## 2024-02-02 PROCEDURE — 93005 ELECTROCARDIOGRAM TRACING: CPT

## 2024-02-02 PROCEDURE — 83735 ASSAY OF MAGNESIUM: CPT | Performed by: EMERGENCY MEDICINE

## 2024-02-02 PROCEDURE — 25010000002 ONDANSETRON PER 1 MG: Performed by: EMERGENCY MEDICINE

## 2024-02-02 PROCEDURE — 71275 CT ANGIOGRAPHY CHEST: CPT

## 2024-02-02 PROCEDURE — 25510000001 IOPAMIDOL PER 1 ML: Performed by: INTERNAL MEDICINE

## 2024-02-02 PROCEDURE — B2111ZZ FLUOROSCOPY OF MULTIPLE CORONARY ARTERIES USING LOW OSMOLAR CONTRAST: ICD-10-PCS | Performed by: INTERNAL MEDICINE

## 2024-02-02 PROCEDURE — 93458 L HRT ARTERY/VENTRICLE ANGIO: CPT | Performed by: INTERNAL MEDICINE

## 2024-02-02 PROCEDURE — 25810000003 SODIUM CHLORIDE 0.9 % SOLUTION

## 2024-02-02 PROCEDURE — C1894 INTRO/SHEATH, NON-LASER: HCPCS | Performed by: INTERNAL MEDICINE

## 2024-02-02 PROCEDURE — 25010000002 MORPHINE PER 10 MG: Performed by: STUDENT IN AN ORGANIZED HEALTH CARE EDUCATION/TRAINING PROGRAM

## 2024-02-02 PROCEDURE — 25010000002 HEPARIN (PORCINE) 25000-0.45 UT/250ML-% SOLUTION: Performed by: EMERGENCY MEDICINE

## 2024-02-02 PROCEDURE — 25010000002 MIDAZOLAM PER 1MG: Performed by: INTERNAL MEDICINE

## 2024-02-02 PROCEDURE — B2151ZZ FLUOROSCOPY OF LEFT HEART USING LOW OSMOLAR CONTRAST: ICD-10-PCS | Performed by: INTERNAL MEDICINE

## 2024-02-02 PROCEDURE — 36415 COLL VENOUS BLD VENIPUNCTURE: CPT

## 2024-02-02 PROCEDURE — 93306 TTE W/DOPPLER COMPLETE: CPT | Performed by: INTERNAL MEDICINE

## 2024-02-02 PROCEDURE — 93005 ELECTROCARDIOGRAM TRACING: CPT | Performed by: STUDENT IN AN ORGANIZED HEALTH CARE EDUCATION/TRAINING PROGRAM

## 2024-02-02 PROCEDURE — 25010000002 FENTANYL CITRATE (PF) 100 MCG/2ML SOLUTION: Performed by: INTERNAL MEDICINE

## 2024-02-02 PROCEDURE — C1769 GUIDE WIRE: HCPCS | Performed by: INTERNAL MEDICINE

## 2024-02-02 PROCEDURE — 82550 ASSAY OF CK (CPK): CPT | Performed by: INTERNAL MEDICINE

## 2024-02-02 PROCEDURE — 25010000002 HEPARIN (PORCINE) PER 1000 UNITS: Performed by: INTERNAL MEDICINE

## 2024-02-02 PROCEDURE — 25510000001 IOPAMIDOL PER 1 ML: Performed by: FAMILY MEDICINE

## 2024-02-02 PROCEDURE — 93306 TTE W/DOPPLER COMPLETE: CPT

## 2024-02-02 PROCEDURE — 25010000002 MORPHINE PER 10 MG: Performed by: EMERGENCY MEDICINE

## 2024-02-02 PROCEDURE — 93005 ELECTROCARDIOGRAM TRACING: CPT | Performed by: EMERGENCY MEDICINE

## 2024-02-02 PROCEDURE — 99285 EMERGENCY DEPT VISIT HI MDM: CPT

## 2024-02-02 PROCEDURE — 80053 COMPREHEN METABOLIC PANEL: CPT | Performed by: EMERGENCY MEDICINE

## 2024-02-02 PROCEDURE — 94761 N-INVAS EAR/PLS OXIMETRY MLT: CPT

## 2024-02-02 PROCEDURE — 85610 PROTHROMBIN TIME: CPT | Performed by: EMERGENCY MEDICINE

## 2024-02-02 PROCEDURE — 85730 THROMBOPLASTIN TIME PARTIAL: CPT | Performed by: EMERGENCY MEDICINE

## 2024-02-02 PROCEDURE — 83880 ASSAY OF NATRIURETIC PEPTIDE: CPT | Performed by: EMERGENCY MEDICINE

## 2024-02-02 PROCEDURE — 99222 1ST HOSP IP/OBS MODERATE 55: CPT | Performed by: INTERNAL MEDICINE

## 2024-02-02 PROCEDURE — 25010000002 MORPHINE PER 10 MG

## 2024-02-02 PROCEDURE — 82948 REAGENT STRIP/BLOOD GLUCOSE: CPT

## 2024-02-02 PROCEDURE — 25010000002 SULFUR HEXAFLUORIDE MICROSPH 60.7-25 MG RECONSTITUTED SUSPENSION: Performed by: FAMILY MEDICINE

## 2024-02-02 PROCEDURE — 25810000003 SODIUM CHLORIDE 0.9 % SOLUTION: Performed by: INTERNAL MEDICINE

## 2024-02-02 PROCEDURE — 94799 UNLISTED PULMONARY SVC/PX: CPT

## 2024-02-02 PROCEDURE — 99239 HOSP IP/OBS DSCHRG MGMT >30: CPT | Performed by: FAMILY MEDICINE

## 2024-02-02 PROCEDURE — 99222 1ST HOSP IP/OBS MODERATE 55: CPT | Performed by: STUDENT IN AN ORGANIZED HEALTH CARE EDUCATION/TRAINING PROGRAM

## 2024-02-02 PROCEDURE — 85025 COMPLETE CBC W/AUTO DIFF WBC: CPT | Performed by: EMERGENCY MEDICINE

## 2024-02-02 PROCEDURE — 83690 ASSAY OF LIPASE: CPT | Performed by: EMERGENCY MEDICINE

## 2024-02-02 RX ORDER — HEPARIN SODIUM 1000 [USP'U]/ML
INJECTION, SOLUTION INTRAVENOUS; SUBCUTANEOUS
Status: DISCONTINUED | OUTPATIENT
Start: 2024-02-02 | End: 2024-02-02 | Stop reason: HOSPADM

## 2024-02-02 RX ORDER — NALOXONE HCL 0.4 MG/ML
0.4 VIAL (ML) INJECTION
Status: DISCONTINUED | OUTPATIENT
Start: 2024-02-02 | End: 2024-02-02

## 2024-02-02 RX ORDER — MORPHINE SULFATE 2 MG/ML
1 INJECTION, SOLUTION INTRAMUSCULAR; INTRAVENOUS EVERY 4 HOURS PRN
Status: DISCONTINUED | OUTPATIENT
Start: 2024-02-02 | End: 2024-02-02 | Stop reason: HOSPADM

## 2024-02-02 RX ORDER — MORPHINE SULFATE 2 MG/ML
1 INJECTION, SOLUTION INTRAMUSCULAR; INTRAVENOUS EVERY 4 HOURS PRN
Status: DISCONTINUED | OUTPATIENT
Start: 2024-02-02 | End: 2024-02-02

## 2024-02-02 RX ORDER — SODIUM CHLORIDE 9 MG/ML
100 INJECTION, SOLUTION INTRAVENOUS CONTINUOUS
Status: DISCONTINUED | OUTPATIENT
Start: 2024-02-02 | End: 2024-02-02

## 2024-02-02 RX ORDER — CLOPIDOGREL BISULFATE 75 MG/1
75 TABLET ORAL DAILY
Status: DISCONTINUED | OUTPATIENT
Start: 2024-02-02 | End: 2024-02-02 | Stop reason: HOSPADM

## 2024-02-02 RX ORDER — SODIUM CHLORIDE 9 MG/ML
40 INJECTION, SOLUTION INTRAVENOUS AS NEEDED
Status: DISCONTINUED | OUTPATIENT
Start: 2024-02-02 | End: 2024-02-02 | Stop reason: HOSPADM

## 2024-02-02 RX ORDER — ONDANSETRON 2 MG/ML
4 INJECTION INTRAMUSCULAR; INTRAVENOUS EVERY 6 HOURS PRN
Status: DISCONTINUED | OUTPATIENT
Start: 2024-02-02 | End: 2024-02-02 | Stop reason: HOSPADM

## 2024-02-02 RX ORDER — NITROGLYCERIN 0.4 MG/1
0.4 TABLET SUBLINGUAL
Status: DISCONTINUED | OUTPATIENT
Start: 2024-02-02 | End: 2024-02-02 | Stop reason: SDUPTHER

## 2024-02-02 RX ORDER — SODIUM CHLORIDE 9 MG/ML
50 INJECTION, SOLUTION INTRAVENOUS CONTINUOUS
Status: DISCONTINUED | OUTPATIENT
Start: 2024-02-02 | End: 2024-02-02

## 2024-02-02 RX ORDER — ACETAMINOPHEN 325 MG/1
650 TABLET ORAL EVERY 4 HOURS PRN
Status: DISCONTINUED | OUTPATIENT
Start: 2024-02-02 | End: 2024-02-02 | Stop reason: HOSPADM

## 2024-02-02 RX ORDER — BISACODYL 10 MG
10 SUPPOSITORY, RECTAL RECTAL DAILY PRN
Status: DISCONTINUED | OUTPATIENT
Start: 2024-02-02 | End: 2024-02-02 | Stop reason: HOSPADM

## 2024-02-02 RX ORDER — BISACODYL 5 MG/1
5 TABLET, DELAYED RELEASE ORAL DAILY PRN
Status: DISCONTINUED | OUTPATIENT
Start: 2024-02-02 | End: 2024-02-02 | Stop reason: HOSPADM

## 2024-02-02 RX ORDER — FENTANYL CITRATE 50 UG/ML
INJECTION, SOLUTION INTRAMUSCULAR; INTRAVENOUS
Status: DISCONTINUED | OUTPATIENT
Start: 2024-02-02 | End: 2024-02-02 | Stop reason: HOSPADM

## 2024-02-02 RX ORDER — MIDAZOLAM HYDROCHLORIDE 2 MG/2ML
INJECTION, SOLUTION INTRAMUSCULAR; INTRAVENOUS
Status: DISCONTINUED | OUTPATIENT
Start: 2024-02-02 | End: 2024-02-02 | Stop reason: HOSPADM

## 2024-02-02 RX ORDER — ASPIRIN 81 MG/1
324 TABLET, CHEWABLE ORAL ONCE
Status: COMPLETED | OUTPATIENT
Start: 2024-02-02 | End: 2024-02-02

## 2024-02-02 RX ORDER — MORPHINE SULFATE 2 MG/ML
2 INJECTION, SOLUTION INTRAMUSCULAR; INTRAVENOUS ONCE
Status: COMPLETED | OUTPATIENT
Start: 2024-02-02 | End: 2024-02-02

## 2024-02-02 RX ORDER — NALOXONE HCL 0.4 MG/ML
0.4 VIAL (ML) INJECTION
Status: DISCONTINUED | OUTPATIENT
Start: 2024-02-02 | End: 2024-02-02 | Stop reason: HOSPADM

## 2024-02-02 RX ORDER — POLYETHYLENE GLYCOL 3350 17 G
2 POWDER IN PACKET (EA) ORAL
Status: DISCONTINUED | OUTPATIENT
Start: 2024-02-02 | End: 2024-02-02 | Stop reason: HOSPADM

## 2024-02-02 RX ORDER — SODIUM CHLORIDE 0.9 % (FLUSH) 0.9 %
10 SYRINGE (ML) INJECTION AS NEEDED
Status: DISCONTINUED | OUTPATIENT
Start: 2024-02-02 | End: 2024-02-02 | Stop reason: HOSPADM

## 2024-02-02 RX ORDER — SODIUM CHLORIDE 9 MG/ML
INJECTION, SOLUTION INTRAVENOUS
Status: COMPLETED | OUTPATIENT
Start: 2024-02-02 | End: 2024-02-02

## 2024-02-02 RX ORDER — MORPHINE SULFATE 2 MG/ML
1 INJECTION, SOLUTION INTRAMUSCULAR; INTRAVENOUS ONCE AS NEEDED
Status: COMPLETED | OUTPATIENT
Start: 2024-02-02 | End: 2024-02-02

## 2024-02-02 RX ORDER — LIDOCAINE HYDROCHLORIDE 20 MG/ML
5 SOLUTION OROPHARYNGEAL ONCE
Status: COMPLETED | OUTPATIENT
Start: 2024-02-02 | End: 2024-02-02

## 2024-02-02 RX ORDER — ATORVASTATIN CALCIUM 40 MG/1
40 TABLET, FILM COATED ORAL NIGHTLY
Status: DISCONTINUED | OUTPATIENT
Start: 2024-02-02 | End: 2024-02-02 | Stop reason: HOSPADM

## 2024-02-02 RX ORDER — HEPARIN SODIUM 10000 [USP'U]/100ML
12 INJECTION, SOLUTION INTRAVENOUS
Status: DISCONTINUED | OUTPATIENT
Start: 2024-02-02 | End: 2024-02-02

## 2024-02-02 RX ORDER — ONDANSETRON 4 MG/1
4 TABLET, ORALLY DISINTEGRATING ORAL EVERY 6 HOURS PRN
Status: DISCONTINUED | OUTPATIENT
Start: 2024-02-02 | End: 2024-02-02 | Stop reason: HOSPADM

## 2024-02-02 RX ORDER — SODIUM CHLORIDE 0.9 % (FLUSH) 0.9 %
10 SYRINGE (ML) INJECTION EVERY 12 HOURS SCHEDULED
Status: DISCONTINUED | OUTPATIENT
Start: 2024-02-02 | End: 2024-02-02 | Stop reason: HOSPADM

## 2024-02-02 RX ORDER — ONDANSETRON 2 MG/ML
4 INJECTION INTRAMUSCULAR; INTRAVENOUS ONCE
Status: COMPLETED | OUTPATIENT
Start: 2024-02-02 | End: 2024-02-02

## 2024-02-02 RX ORDER — ASPIRIN 81 MG/1
81 TABLET ORAL DAILY
Status: DISCONTINUED | OUTPATIENT
Start: 2024-02-02 | End: 2024-02-02 | Stop reason: HOSPADM

## 2024-02-02 RX ORDER — CARVEDILOL 3.12 MG/1
3.12 TABLET ORAL 2 TIMES DAILY WITH MEALS
Status: DISCONTINUED | OUTPATIENT
Start: 2024-02-02 | End: 2024-02-02 | Stop reason: HOSPADM

## 2024-02-02 RX ORDER — AMOXICILLIN 250 MG
2 CAPSULE ORAL 2 TIMES DAILY
Status: DISCONTINUED | OUTPATIENT
Start: 2024-02-02 | End: 2024-02-02 | Stop reason: HOSPADM

## 2024-02-02 RX ORDER — NICOTINE 21 MG/24HR
1 PATCH, TRANSDERMAL 24 HOURS TRANSDERMAL EVERY 24 HOURS
Qty: 30 PATCH | Refills: 0 | Status: SHIPPED | OUTPATIENT
Start: 2024-02-02 | End: 2024-03-03

## 2024-02-02 RX ORDER — POLYETHYLENE GLYCOL 3350 17 G/17G
17 POWDER, FOR SOLUTION ORAL DAILY PRN
Status: DISCONTINUED | OUTPATIENT
Start: 2024-02-02 | End: 2024-02-02 | Stop reason: HOSPADM

## 2024-02-02 RX ORDER — ALUMINA, MAGNESIA, AND SIMETHICONE 2400; 2400; 240 MG/30ML; MG/30ML; MG/30ML
15 SUSPENSION ORAL ONCE
Status: COMPLETED | OUTPATIENT
Start: 2024-02-02 | End: 2024-02-02

## 2024-02-02 RX ORDER — NICOTINE 21 MG/24HR
1 PATCH, TRANSDERMAL 24 HOURS TRANSDERMAL EVERY 24 HOURS
Status: DISCONTINUED | OUTPATIENT
Start: 2024-02-02 | End: 2024-02-02 | Stop reason: HOSPADM

## 2024-02-02 RX ORDER — NITROGLYCERIN 0.4 MG/1
0.4 TABLET SUBLINGUAL
Status: DISCONTINUED | OUTPATIENT
Start: 2024-02-02 | End: 2024-02-02 | Stop reason: HOSPADM

## 2024-02-02 RX ORDER — MULTIPLE VITAMINS W/ MINERALS TAB 9MG-400MCG
1 TAB ORAL DAILY
COMMUNITY

## 2024-02-02 RX ORDER — LIDOCAINE HYDROCHLORIDE 20 MG/ML
INJECTION, SOLUTION INFILTRATION; PERINEURAL
Status: DISCONTINUED | OUTPATIENT
Start: 2024-02-02 | End: 2024-02-02 | Stop reason: HOSPADM

## 2024-02-02 RX ADMIN — IOPAMIDOL 100 ML: 755 INJECTION, SOLUTION INTRAVENOUS at 11:37

## 2024-02-02 RX ADMIN — ALUMINUM HYDROXIDE, MAGNESIUM HYDROXIDE, AND DIMETHICONE 15 ML: 400; 400; 40 SUSPENSION ORAL at 16:03

## 2024-02-02 RX ADMIN — NITROGLYCERIN 0.4 MG: 0.4 TABLET SUBLINGUAL at 03:19

## 2024-02-02 RX ADMIN — NITROGLYCERIN 0.4 MG: 0.4 TABLET, ORALLY DISINTEGRATING SUBLINGUAL at 00:48

## 2024-02-02 RX ADMIN — NITROGLYCERIN 0.4 MG: 0.4 TABLET, ORALLY DISINTEGRATING SUBLINGUAL at 01:30

## 2024-02-02 RX ADMIN — ASPIRIN 81 MG 324 MG: 81 TABLET ORAL at 00:46

## 2024-02-02 RX ADMIN — CLOPIDOGREL BISULFATE 75 MG: 75 TABLET ORAL at 10:21

## 2024-02-02 RX ADMIN — MORPHINE SULFATE 1 MG: 2 INJECTION, SOLUTION INTRAMUSCULAR; INTRAVENOUS at 10:23

## 2024-02-02 RX ADMIN — HEPARIN SODIUM 12 UNITS/KG/HR: 10000 INJECTION, SOLUTION INTRAVENOUS at 02:37

## 2024-02-02 RX ADMIN — MORPHINE SULFATE 2 MG: 2 INJECTION, SOLUTION INTRAMUSCULAR; INTRAVENOUS at 02:27

## 2024-02-02 RX ADMIN — MORPHINE SULFATE 1 MG: 2 INJECTION, SOLUTION INTRAMUSCULAR; INTRAVENOUS at 04:36

## 2024-02-02 RX ADMIN — SODIUM CHLORIDE 500 ML: 9 INJECTION, SOLUTION INTRAVENOUS at 04:20

## 2024-02-02 RX ADMIN — ONDANSETRON 4 MG: 2 INJECTION INTRAMUSCULAR; INTRAVENOUS at 02:22

## 2024-02-02 RX ADMIN — SULFUR HEXAFLUORIDE 2 ML: KIT at 13:02

## 2024-02-02 RX ADMIN — CARVEDILOL 3.12 MG: 3.12 TABLET, FILM COATED ORAL at 10:21

## 2024-02-02 RX ADMIN — NITROGLYCERIN 0.4 MG: 0.4 TABLET, ORALLY DISINTEGRATING SUBLINGUAL at 03:27

## 2024-02-02 RX ADMIN — ASPIRIN 81 MG: 81 TABLET, COATED ORAL at 10:21

## 2024-02-02 RX ADMIN — LIDOCAINE HYDROCHLORIDE 5 ML: 20 SOLUTION ORAL; TOPICAL at 16:03

## 2024-02-02 NOTE — PLAN OF CARE
Goal Outcome Evaluation:    Pt admitted from the ED overnight for NSTEMI, chest pain unrelieved with sublingual nitro, Heparin gtt infusing at 12 units/kg/hour, pt cooperative with care, strict NPO over night as ordered, no apparent signs of distress noted, will give specific details to on-coming RN during shift change.

## 2024-02-02 NOTE — CONSULTS
[facility]   Cardiology Consult Note    Patient Name: Atilio Olson  : 1950  MRN: [id number]  Primary Care Physician:  Shay Vora MD  Referring Physician: Phil Galarza MD    Date of admission: 2024    Subjective   Subjective     Reason for Consultation : Chest pain    Chief Complaint : Chest pain    HPI:  Atilio Olson is a 73 y.o. male with diabetes mellitus, hypertension, previous stroke , chronic arthritis, status post bilateral knee surgeries, peripheral neuropathy, degenerative disc disease, hydrocele, previous lower extremity surgery for foot drop.  He presented emergency room because of chest pain.  The pain suddenly started around 10 PM while he was resting.  It is an intense but dull pain in the mid lower chest and upper abdomen.  He reports radiation of the pain going to the left shoulder.  The pain is waxing and waning but not completely going away.  In the ER, he was noted to be hemodynamically stable.  Cardiac markers are not able to have been negative.  EKG showed baseline right bundle branch block.  He was loaded with aspirin and nitroglycerin with without much relief for the pain.  Subsequently he was started on heparin infusion with working diagnosis of unstable angina.  This morning he again had intense pain and is getting IV morphine without no relief.  He also reports shortness of breath.  Pain does not increase with deep breaths or movements of the chest.    He has no documented cardiac disease.  No previous cardiac workup available.  He is taking dual antiplatelet.  Because of previous stroke.  He currently denies any palpitations or dizziness.    Review of Systems   All systems were reviewed and negative except for: Chest pain with radiation to left shoulder and shortness of breath.    Personal History     Past Medical History:   Diagnosis Date    Abnormal ECG [id number]    Acid reflux     Arthritis     Arthritis of back     Bladder disorder     Cervical disc  disorder     Claustrophobia     Congestive heart failure (CHF)     UNSURE OF LAST EPISODE, NO CURRENT ISSUES, FOLL[facility]    Deep vein thrombosis 2006    right arm    Diabetes     Fracture, foot     High cholesterol     Hypertension     Limb swelling     Low back pain     Lumbosacral disc disease     Neurogenic bladder 01/18/2017    NSTEMI (non-ST elevated myocardial infarction) 2/2/2024    Periarthritis of shoulder     Peripheral neuropathy     Stroke     x2, last episode 2006, left side slight weakness    Tear of meniscus of knee     Tendonitis of shoulder 02/19/2018    ROTATOR CUFF RIGHT           Family History: family history includes Arthritis in his father and mother; Diabetes in his father, mother, sister, and son; Heart disease in his father and mother; Stroke in his father and sister. Otherwise pertinent FHx was reviewed and not pertinent to current issue.    Social History:  reports that he has been smoking cigarettes. He has a 15.00 pack-year smoking history. He has never used smokeless tobacco. He reports that he does not currently use alcohol. He reports that he does not use drugs.    Home Medications:  B Complex-C-Folic Acid, Loratadine, aspirin, atorvastatin, baclofen, clopidogrel, diclofenac, fish oil, gabapentin, losartan-HCTZ (HYZAAR) 100-12.5 combo dose, metFORMIN, pantoprazole, temazepam, and traMADol    Allergies:  No Known Allergies    Objective    Objective     Vitals:   Temp:  [97.5 °F (36.4 °C)-97.9 °F (36.6 °C)] 97.7 °F (36.5 °C)  Heart Rate:  [63-74] 63  Resp:  [16-18] 16  BP: (107-132)/(50-64) 107/50  Flow (L/min):  [2] 2      Physical Exam:   Constitutional: Awake, alert, in moderate distress because of chest pain and shoulder pain   Eyes: PERRLA, sclerae anicteric, no conjunctival injection   HENT: NCAT, mucous membranes moist   Neck: Supple, no thyromegaly, no lymphadenopathy, trachea midline   Respiratory: Clear to auscultation bilaterally, nonlabored respirations     Cardiovascular: RRR, no murmurs, rubs, or gallops, palpable pedal pulses bilaterally   Gastrointestinal: Positive bowel sounds, soft, nontender, nondistended   Musculoskeletal: No bilateral ankle edema, no clubbing or cyanosis to extremities   Psychiatric: Appropriate affect, cooperative   Neurologic: Oriented x 3, speech clear   Skin: No rashes     Result Review    Result Review:  I have personally reviewed the results from the time of this admission to 2/2/2024 08:04 EST and agree with these findings:  [x]  Laboratory  []  Microbiology  [x]  Radiology  [x]  EKG/Telemetry   [x]  Cardiology/Vascular   []  Pathology  [x]  Old records  []  Other:  Most notable findings include:     CMP          3/22/2023    07:08 2/2/2024    00:11   CMP   Glucose 151  125    BUN 20  21    Creatinine 0.84  1.19    EGFR 93.2  64.9    Sodium 143  140    Potassium 4.4  4.1    Chloride 110  103    Calcium 9.7  9.8    Total Protein  6.5    Albumin  4.1    Globulin  2.4    Total Bilirubin  0.3    Alkaline Phosphatase  130    AST (SGOT)  20    ALT (SGPT)  31    Albumin/Globulin Ratio  1.7    BUN/Creatinine Ratio 23.8  17.6    Anion Gap 7.3  12.1       CBC          2/2/2024    00:11   CBC   WBC 9.36    RBC 4.43    Hemoglobin 13.0    Hematocrit 38.9    MCV 87.8    MCH 29.3    MCHC 33.4    RDW 13.2    Platelets 332        Latest Reference Range & Units 02/02/24 00:11 02/02/24 02:03   Creatine Kinase 20 - 200 U/L  67   HS Troponin T <22 ng/L 29 (H) 27 (H)   Troponin T Delta >=-4 - <+4 ng/L  -2   proBNP 0.0 - 900.0 pg/mL <36.0            EKG done earlier this morning showed sinus rhythm, right bundle branch block    Assessment & Plan   Assessment / Plan     Brief Patient Summary:  Atilio Olson is a 73 y.o. male with diabetes mellitus, hypertension, previous stroke , chronic arthritis, status post bilateral knee surgeries, peripheral neuropathy, degenerative disc disease, hydrocele, history of upper extremity DVT.  Presented with sudden onset  of chest pain.    Active Hospital Problems:  Active Hospital Problems    Diagnosis           Chest pain : Constant chest pain since 10 PM last night, initial 2 troponins are within normal limits.  CK-MB is within normal limits.  EKG shows baseline right bundle branch block.  Symptoms are somewhat atypical for angina.  Differential diagnosis include unstable angina, pulmonary embolism, aortic dissection, gastrointestinal etiology.    Previous stroke : On dual antiplatelet therapy at home    Plan:     Continue aspirin Plavix  Continue statins  Currently on heparin drip, to continue  Check D-dimer    Because of constant and significant chest pain, we will proceed with cardiac catheterization to delineate coronary anatomy and angioplasty if indicated.  The risks, benefits and alternatives of the test were explained detail to the patient and he agreed to proceed.    Echocardiogram to be performed to assess LV systolic and diastolic function and to rule out valvular abnormalities, pericardial effusion    Further management plans based on clinical course and test results.  If cath is unremarkable, patient will be scheduled for a CT chest with contrast to rule out pulmonary embolism and aortic dissection    Electronically signed by Ty Rizvi MD, 02/02/24, 8:04 AM EST.

## 2024-02-02 NOTE — Clinical Note
The DP pulses are +2 bilaterally. The PT pulses are +2 bilaterally. The right radial pulse is +2. The right femoral pulse is +2.

## 2024-02-02 NOTE — Clinical Note
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 IR and Transfusion med paged this AM. Heme also aware  If pt continues to consent, will plan for :   1) IR and line placement   2) Transfuse 1upRBC   3) Consent and Exchange transfusion per Tx med team   4) Will start DVT ppx post line placement   - CXR w/o sign of infiltrate (ordered for hypoxia)  Dispo: Pending improvement in counts, pain. PO meds. ~4-5 d     Hypoxic resp failure   New as of 4/7 with sat 88% chest x-ray pending. Also with persistent WBC; therefore will obtain CXR and reassess.   - Very low threshold to add Abx if Febrile or if has Chest pain (Ceftriaxone and azithromycin)     Acute on chronic Anemia   Sickle cell vasoocclusive crisis   HbSS  Recent hospitalization 3/27 - 3/29 for same but left prematurely. On admit retic elevated to 625, hgb and tbili stable. No sign of acute checst of infectious although does have persistent mild leukocytosis  Care plan:   Morphine PCA setup as follow:  Continuous: 1 mg/hr PCA: 0.7 mg Interval timing: 10 minutes Max: 4.2 mg/hr  Overnight from 8pm - 7am switch to 1.2mg/h PCA continuous, 0.5mg Interval timiing,.   - HOLD celecoxib   - RESTART deferasirox (4/7/20)  - Avoid transfusion unless discussed with and approved by heme   - LR TKO   - Will plan for exchange transfusion.      Mild persistent asthma   Well controlled on ICS/ LABA and rescue   - Budesoiide Formoterol 2 puff BID   - Albuterol inhaler PRN      Hx of CVA 2/2 sickle cell   - ASA 81mg Qday     Post-Procedural Saturation was taken from the Right Hand. Sat result is 95%. "  Diet: Combination Diet Regular Diet Adult    Fluids: None, po intake   Lines: PIV  DVT Prophylaxis: Pneumatic Compression Devices  Lopez Catheter: not present  Code Status: Full Code      Disposition Plan   Expected discharge: 4 - 7 days, recommended to prior living arrangement once adequate pain management/ tolerating PO medications.  Entered: Chapis Horta MD 04/07/2020, 7:10 AM       The patient's care was discussed with the Attending Physician, Dr. Ivey and Patient.    Chapis Horta MD  [address], [place]  Pager: [phone]  Please see sticky note for cross cover information  ______________________________________________________________________    Interval History   NAEO   Pt irritable but states she is willing to have line placed and do exhchange transfusion; says \"I told you yes, why are you asking again.\" I stated that there are multiple providers involved therefore I am asking to confirm her wishes.   Denies chest pain, shortness of breath. Has been refusing oxygen.     Data reviewed today: I reviewed all medications, new labs and imaging results over the last 24 hours. I personally reviewed no images or EKG's today.    Physical Exam   Vital Signs: Temp: 98.5  F (36.9  C) Temp src: Oral BP: 114/62 Pulse: 89 Heart Rate: 82 Resp: 16 SpO2: (!) 88 %(patient refusing O2) O2 Device: None (Room air)    Weight: 0 lbs 0 oz  General Appearance:  Comfortable, no distress   Respiratory: clear lung sounds   Cardiovascular: rrr, no mrg   GI: soft. Deferred palpation   Skin: warm, R arm contracture   "

## 2024-02-02 NOTE — H&P
[facility]   HOSPITALIST HISTORY AND PHYSICAL  Date: 2024   Patient Name: Atilio Olson  : 1950  MRN: [id number]  Primary Care Physician:  Shay oVra MD  Date of admission: 2024    Subjective   Subjective     Chief Complaint: Chest pain    HPI:    Atilio Olson is a 73 y.o. male past medical history of hypertension, type 2 diabetes, GERD, hyperlipidemia, CVA on dual antiplatelet therapy, degenerative disc disease, chronic arthritis, peripheral neuropathy who presents to the ER due to abrupt onset of chest pain.  Patient states that roughly around 10 PM tonight he felt a dull aching pain as if someone was sitting on his chest that radiated up to his neck and his left shoulder.  It was associate with some shortness of breath, diaphoresis and dizziness.  He attempted to drink some milk in case this was reflux related however did not improve and his pain progressively intensified to the point that his wife decided to bring him into the ER.  Upon arrival here he was found to be hemodynamically stable and lab workup is revealing of a borderline elevated troponin of 29.  Otherwise lab workup was unremarkable EKG was personally reviewed and showed sinus rhythm with a right bundle branch block pattern and nonspecific inferior repolarization abnormality.  Patient had aspirin load and nitroglycerin given without much relief in his pain at which point patient was started on heparin drip for possible unstable angina versus NSTEMI.  The hospitalist was then contacted for admission.  At the time my exam patient states his chest pain has not resolved completely but it has improved.  Denies having any fevers or chills.  No lower extremity edema over the last few days.  No change in medications.  No diarrhea.      Personal History     Past Medical History:  Past Medical History:   Diagnosis Date    Abnormal ECG [id number]    Acid reflux     Arthritis     Arthritis of back     Bladder disorder      Cervical disc disorder     Claustrophobia     Congestive heart failure (CHF)     UNSURE OF LAST EPISODE, NO CURRENT ISSUES, FOLL[facility]    Deep vein thrombosis 2006    right arm    Diabetes     Fracture, foot     High cholesterol     Hypertension     Limb swelling     Low back pain     Lumbosacral disc disease     Neurogenic bladder 01/18/2017    NSTEMI (non-ST elevated myocardial infarction) 2/2/2024    Periarthritis of shoulder     Peripheral neuropathy     Stroke     x2, last episode 2006, left side slight weakness    Tear of meniscus of knee     Tendonitis of shoulder 02/19/2018    ROTATOR CUFF RIGHT          Past Surgical History:  Past Surgical History:   Procedure Laterality Date    BACK SURGERY      COLONOSCOPY      EXCISION      CYST EXCISION    FOOT SURGERY      GALLBLADDER SURGERY      HAND SURGERY      HERNIA REPAIR      JOINT REPLACEMENT      LUMBAR DISCECTOMY Left 03/22/2023    Procedure: MINIMALLY INVASIVE LUMBAR LAMINECTOMY, left approach, lumbar 4-lumbar 5;  Surgeon: Ajit Staley MD;  Location: [facility];  Service: Neurosurgery;  Laterality: Left;    OTHER SURGICAL HISTORY      METAL IMPLANT    OTHER SURGICAL HISTORY      ARTIFICAL JOINTS/LIMBS    OTHER SURGICAL HISTORY      JOINT SURGERY    REPLACEMENT TOTAL KNEE Left     REPLACEMENT TOTAL KNEE Right     X2    SHOULDER SURGERY      TONSILLECTOMY      WRIST SURGERY           Family History:   Reviewed and noncontributory except as mentioned in HPI    Social History:   Social Determinants of Health     Tobacco Use: High Risk (1/10/2024)    Patient History     Smoking Tobacco Use: Some Days     Smokeless Tobacco Use: Never     Passive Exposure: Not on file   Alcohol Use: Not on file   Financial Resource Strain: Not on file   Food Insecurity: Not on file   Transportation Needs: Not on file   Physical Activity: Not on file   Stress: Not on file   Social Connections: Unknown (10/12/2023)    Family and Community Support     Help  with Day-to-Day Activities: Not on file     Lonely or Isolated: Not on file   Interpersonal Safety: Not At Risk (2/2/2024)    Abuse Screen     Unsafe at Home or Work/School: no     Feels Threatened by Someone?: no     Does Anyone Keep You from Contacting Others or Doint Things Outside the Home?: no     Physical Sign of Abuse Present: no   Depression: Not on file   Housing Stability: Unknown (3/22/2023)    Housing Stability     Current Living Arrangements: home     Potentially Unsafe Housing Conditions: Not on file   Utilities: Not on file   Health Literacy: Unknown (3/17/2023)    Education     Help with school or training?: Not on file     Preferred Language: English   Employment: Unknown (10/12/2023)    Employment     Do you want help finding or keeping work or a job?: Not on file   Disabilities: Not At Risk (3/22/2023)    Disabilities     Concentrating, Remembering, or Making Decisions Difficulty: no     Doing Errands Independently Difficulty: no         Home Medications:  B Complex-C-Folic Acid, Loratadine, aspirin, atorvastatin, baclofen, clopidogrel, diclofenac, fish oil, gabapentin, losartan-HCTZ (HYZAAR) 100-12.5 combo dose, metFORMIN, pantoprazole, temazepam, and traMADol    Allergies:  No Known Allergies    Review of Systems   All systems were reviewed and negative except for: Chest pain, dizziness,    Objective   Objective     Vitals:   Temp:  [97.9 °F (36.6 °C)] 97.9 °F (36.6 °C)  Heart Rate:  [71] 71  Resp:  [18] 18  BP: (116-132)/(60-64) 116/60    Physical Exam    Constitutional: Awake, alert, no acute distress   Eyes: Pupils equal, sclerae anicteric, no conjunctival injection   HENT: NCAT, mucous membranes moist   Neck: Supple, no thyromegaly, no lymphadenopathy, trachea midline   Respiratory: Clear to auscultation bilaterally, nonlabored respirations    Cardiovascular: RRR, no murmurs, rubs, or gallops, palpable pedal pulses bilaterally   Gastrointestinal: Positive bowel sounds, soft, nontender,  nondistended   Musculoskeletal: No bilateral ankle edema, no clubbing or cyanosis to extremities   Psychiatric: Appropriate affect, cooperative   Neurologic: Oriented x 3, strength symmetric in all extremities, Cranial Nerves grossly intact to confrontation, speech clear   Skin: No rashes     Result Review    Result Review:  I have personally reviewed the results from the time of this admission to 2/2/2024 02:25 EST and agree with these findings:  [x]  Laboratory  [x]  Microbiology  [x]  Radiology  [x]  EKG/Telemetry   [x]  Cardiology/Vascular   []  Pathology  [x]  Old records  []  Other:      Assessment & Plan   Assessment / Plan     Assessment/Plan:   Suspected NSTEMI versus unstable angina  Elevated troponin likely secondary above  Hypertension  Type 2 diabetes  Hyperlipidemia  History of CVA on dual antiplatelet therapy  GERD  Chronic degenerative disc disease    Plan  - Admit to hospitalist service  - Troponin is downtrending however given concerning symptomology and risk factors (KEILA = 5 points) we will continue heparin drip.  May need nitroglycerin drip if chest pain does not improve.  Morphine as needed also ordered.  Cardiology consulted and will appreciate recommendations regarding catheterization for which we will keep patient n.p.o. at this time.  Continue home dual antiplatelet therapy.  Intensify statin therapy and risk stratify with A1c and lipid panel  - Continue PPI for GERD  - Insulin sliding scale and Accu-Cheks for type 2 diabetes  - Clarify all other home meds and resume as appropriate      Discussed with ER Physician and Nurse    All labs/imaging studies were personally reviewed and findings are as noted above      DVT Prophylaxis: Heparin drip    CODE STATUS:    Code Status (Patient has no pulse and is not breathing): CPR (Attempt to Resuscitate)  Medical Interventions (Patient has pulse or is breathing): Full Support      Admission Status:  I believe this patient meets inpatient  status.    Electronically signed by Phil Galarza MD, 02/02/24, 2:25 AM EST.

## 2024-02-02 NOTE — ED PROVIDER NOTES
"Time: 12:42 AM EST  Date of encounter:  2/2/2024  Independent Historian/Clinical History and Information was obtained by:   Patient and Family    History is limited by: N/A    Chief Complaint: Chest pain tonight      History of Present Illness:  Patient is a 73 y.o. year old male with history of diabetes, hypertension and hyperlipidemia who presents to the emergency department for evaluation of substernal chest pain radiating to the left shoulder and left side of the neck occurring a couple hours prior to arrival at rest.    He denies any known history of coronary artery disease or any previous cardiac stents.    He has not been ill or sick with fevers or cough or congestion no recent illness reported.    He did recently have some significant orthopedic surgery to the left foot for \"collapsed foot\".    HPI    Patient Care Team  Primary Care Provider: Shay Vora MD    Past Medical History:     No Known Allergies  Past Medical History:   Diagnosis Date    Abnormal ECG [id number]    Acid reflux     Arthritis     Arthritis of back     Bladder disorder     Cervical disc disorder     Claustrophobia     Congestive heart failure (CHF)     UNSURE OF LAST EPISODE, NO CURRENT ISSUES, FOLL[facility]    Deep vein thrombosis 2006    right arm    Diabetes     Fracture, foot     High cholesterol     Hypertension     Limb swelling     Low back pain     Lumbosacral disc disease     Neurogenic bladder 01/18/2017    Periarthritis of shoulder     Peripheral neuropathy     Stroke     x2, last episode 2006, left side slight weakness    Tear of meniscus of knee     Tendonitis of shoulder 02/19/2018    ROTATOR CUFF RIGHT      Past Surgical History:   Procedure Laterality Date    BACK SURGERY      COLONOSCOPY      EXCISION      CYST EXCISION    FOOT SURGERY      GALLBLADDER SURGERY      HAND SURGERY      HERNIA REPAIR      JOINT REPLACEMENT      LUMBAR DISCECTOMY Left 03/22/2023    Procedure: MINIMALLY INVASIVE LUMBAR " LAMINECTOMY, left approach, lumbar 4-lumbar 5;  Surgeon: Ajit Staley MD;  Location: [facility] MA[facility] OR;  Service: Neurosurgery;  Laterality: Left;    OTHER SURGICAL HISTORY      METAL IMPLANT    OTHER SURGICAL HISTORY      ARTIFICAL JOINTS/LIMBS    OTHER SURGICAL HISTORY      JOINT SURGERY    REPLACEMENT TOTAL KNEE Left     REPLACEMENT TOTAL KNEE Right     X2    SHOULDER SURGERY      TONSILLECTOMY      WRIST SURGERY       Family History   Problem Relation Age of Onset    Arthritis Mother     Heart disease Mother     Diabetes Mother     Stroke Father     Heart disease Father     Diabetes Father     Arthritis Father     Stroke Sister     Diabetes Sister     Diabetes Son     Malig Hyperthermia Neg Hx        Home Medications:  Prior to Admission medications    Medication Sig Start Date End Date Taking? Authorizing Provider   aspirin 81 MG EC tablet Take 1 tablet by mouth Daily. DR. STALEY'S OFFICE ([facility]) TO NOTIFY PATIENT  IF AND WHEN TO HOLD MED PREOP.    Provider, MD Margarita   atorvastatin (LIPITOR) 10 MG tablet Take 1 tablet by mouth Every Night.    Emergency, Nurse Epic, RN   B Complex-C-Folic Acid (FOLBEE PLUS PO)     Nurse Juliano Kincaid RN   baclofen (LIORESAL) 10 MG tablet Take 1 tablet by mouth 3 (Three) Times a Day.    Codi, Nurse Juliano RN   clopidogrel (PLAVIX) 75 MG tablet Take 1 tablet by mouth Daily. Last dose 03/16/23  Indications: Resume on 3/24/23    Emergency, Nurse Juliano RN   diclofenac (VOLTAREN) 75 MG EC tablet Take 1 tablet by mouth 2 (Two) Times a Day.    ProviderMargarita MD   gabapentin (NEURONTIN) 800 MG tablet Take 1 tablet by mouth 3 (Three) Times a Day.    Codi, Nurse Juliano RN   Loratadine 10 MG capsule Take 1 capsule by mouth Daily.    Emergency, Nurse Epic, RN   losartan-HCTZ (HYZAAR) 100-12.5 combo dose Take 1 dose by mouth Daily.    Provider, MD Margarita   metFORMIN (GLUCOPHAGE) 500 MG tablet metformin 500 mg oral tablet take 1 tablet (500 mg) by oral route 2  "times per day with morning and evening meals   Active    Emergency, Nurse Juliano RN   Omega-3 Fatty Acids (fish oil) 1000 MG capsule capsule     Emergency, Nurse Juliano RN   pantoprazole (PROTONIX) 20 MG EC tablet Take 2 tablets by mouth Daily.    Emergency, Nurse Juliano RN   temazepam (RESTORIL) 15 MG capsule 1 capsule At Night As Needed.    Emergency, Nurse Juliano RN   traMADol (ULTRAM) 50 MG tablet Take 1 tablet by mouth Every 8 (Eight) Hours As Needed for Moderate Pain.    Provider, Historical, MD        Social History:   Social History     Tobacco Use    Smoking status: Some Days     Packs/day: 0.50     Years: 30.00     Additional pack years: 0.00     Total pack years: 15.00     Types: Cigarettes    Smokeless tobacco: Never    Tobacco comments:     Last was 2 weeks ago   Vaping Use    Vaping Use: Never used   Substance Use Topics    Alcohol use: Not Currently     Comment: FORMER . DRINKS IN THE PAST     Drug use: Never         Review of Systems:  Review of Systems   I performed a 10 point review of systems which was all negative, except for the positives found in the HPI above.  Physical Exam:  /60   Pulse 71   Temp 97.9 °F (36.6 °C) (Oral)   Resp 18   Ht 162.6 cm (64\")   Wt 77.1 kg (170 lb)   SpO2 91%   BMI 29.18 kg/m²     Physical Exam   General: Awake alert and in moderate distress, holding chest and pain    HEENT: Head normocephalic atraumatic, eyes PERRLA EOMI, nose normal, oropharynx normal.    Neck: Supple full range of motion, no meningismus, no lymphadenopathy    Heart: Regular rate and rhythm, no murmurs or rubs, 2+ radial pulses bilaterally that are symmetric on exam    Lungs: Clear to auscultation bilaterally without wheezes or crackles, no respiratory distress    Abdomen: Soft, nontender, nondistended, no rebound or guarding    Skin: Warm, dry, no rash    Musculoskeletal: Normal range of motion, no lower extremity edema    Neurologic: Oriented x3, no motor deficits no sensory " deficits    Psychiatric: Mood appears stable, no psychosis          Procedures:  Procedures      Medical Decision Making:      Comorbidities that affect care:    Diabetes, Hypertension, Smoking    External Notes reviewed:    Previous EKG: I compared today's EKG to previous EKGs and it looks unchanged from last year April 2023 which also shows an old right bundle branch block      The following orders were placed and all results were independently analyzed by me:  Orders Placed This Encounter   Procedures    XR Chest 1 View    [place] Draw    High Sensitivity Troponin T    Comprehensive Metabolic Panel    Lipase    BNP    Magnesium    CBC Auto Differential    High Sensitivity Troponin T 2Hr    Protime-INR    aPTT    aPTT    NPO Diet NPO Type: Strict NPO    Undress & Gown    Continuous Pulse Oximetry    Notify Provider Platelet Count Less Than 56571    Notify Provider if PTT Not in Therapeutic Range After 24 Hours    Stop Infusion & Notify Provider if Bleeding Occurs    RN To Release aPTT Order 6 Hours After Heparin Bolus & 6 Hours After Any Heparin Rate Change    After 2 Consecutive Therapeutic aPTTs, Obtain aPTT Daily.  If a Rate Adjustment is Necessary, Resume Every 6 Hour aPTT Draws    Hospitalist (on-call MD unless specified)    Oxygen Therapy- Nasal Cannula; Titrate 1-6 LPM Per SpO2; 90 - 95%    ECG 12 Lead ED Triage Standing Order; Chest Pain    ECG 12 Lead ED Triage Standing Order; Chest Pain    Insert Peripheral IV    CBC & Differential    Green Top (Gel)    Lavender Top    Gold Top - SST    Light Blue Top    CBC & Differential       Medications Given in the Emergency Department:  Medications   sodium chloride 0.9 % flush 10 mL (has no administration in time range)   nitroglycerin (NITROSTAT) SL tablet 0.4 mg (0.4 mg Sublingual Given 2/2/24 0130)   heparin bolus from bag 4,600 Units (has no administration in time range)   heparin 98986 units/250 mL (100 units/mL) in 0.45 % NaCl infusion (has no administration  in time range)   heparin bolus from bag 3,900 Units (has no administration in time range)   heparin bolus from bag 1,900 Units (has no administration in time range)   aspirin chewable tablet 324 mg (324 mg Oral Given 2/2/24 0046)        ED Course:    ED Course as of 02/02/24 0144 Fri Feb 02, 2024 0036 EKG: I interpreted his twelve-lead EKG is normal sinus rhythm at 72 bpm, normal P waves, QRS showing right bundle branch block, normal ST segments and T waves; no acute ischemia.   Unchanged from old EKG last April. [VS]      ED Course User Index  [VS] Israel Neves MD       Labs:    Lab Results (last 24 hours)       Procedure Component Value Units Date/Time    High Sensitivity Troponin T [[id number]]  (Abnormal) Collected: 02/02/24 0011    Specimen: Blood Updated: 02/02/24 0114     HS Troponin T 29 ng/L     Narrative:      High Sensitive Troponin T Reference Range:  <14.0 ng/L- Negative Female for AMI  <22.0 ng/L- Negative Male for AMI  >=14 - Abnormal Female indicating possible myocardial injury.  >=22 - Abnormal Male indicating possible myocardial injury.   Clinicians would have to utilize clinical acumen, EKG, Troponin, and serial changes to determine if it is an Acute Myocardial Infarction or myocardial injury due to an underlying chronic condition.         CBC & Differential [[id number]]  (Abnormal) Collected: 02/02/24 0011    Specimen: Blood Updated: 02/02/24 0044    Narrative:      The following orders were created for panel order CBC & Differential.  Procedure                               Abnormality         Status                     ---------                               -----------         ------                     CBC Auto Differential[[id number]]        Abnormal            Final result                 Please view results for these tests on the individual orders.    Comprehensive Metabolic Panel [[id number]]  (Abnormal) Collected: 02/02/24 0011    Specimen: Blood Updated: 02/02/24 0114     Glucose 125  mg/dL      BUN 21 mg/dL      Creatinine 1.19 mg/dL      Sodium 140 mmol/L      Potassium 4.1 mmol/L      Chloride 103 mmol/L      CO2 24.9 mmol/L      Calcium 9.8 mg/dL      Total Protein 6.5 g/dL      Albumin 4.1 g/dL      ALT (SGPT) 31 U/L      AST (SGOT) 20 U/L      Alkaline Phosphatase 130 U/L      Total Bilirubin 0.3 mg/dL      Globulin 2.4 gm/dL      A/G Ratio 1.7 g/dL      BUN/Creatinine Ratio 17.6     Anion Gap 12.1 mmol/L      eGFR 64.9 mL/min/1.73     Narrative:      GFR Normal >60  Chronic Kidney Disease <60  Kidney Failure <15    The GFR formula is only valid for adults with stable renal function between ages 18 and 70.    Lipase [[id number]]  (Abnormal) Collected: 02/02/24 0011    Specimen: Blood Updated: 02/02/24 0114     Lipase 127 U/L     BNP [[id number]]  (Normal) Collected: 02/02/24 0011    Specimen: Blood Updated: 02/02/24 0110     proBNP <36.0 pg/mL     Narrative:      This assay is used as an aid in the diagnosis of individuals suspected of having heart failure. It can be used as an aid in the diagnosis of acute decompensated heart failure (ADHF) in patients presenting with signs and symptoms of ADHF to the emergency department (ED). In addition, NT-proBNP of <300 pg/mL indicates ADHF is not likely.    Age Range Result Interpretation  NT-proBNP Concentration (pg/mL:      <50             Positive            >450                   Gray                 300-450                    Negative             <300    50-75           Positive            >900                  Gray                300-900                  Negative            <300      >75             Positive            >1800                  Gray                300-1800                  Negative            <300    Magnesium [[id number]]  (Normal) Collected: 02/02/24 0011    Specimen: Blood Updated: 02/02/24 0114     Magnesium 2.2 mg/dL     CBC Auto Differential [[id number]]  (Abnormal) Collected: 02/02/24 0011    Specimen: Blood Updated: 02/02/24  0044     WBC 9.36 10*3/mm3      RBC 4.43 10*6/mm3      Hemoglobin 13.0 g/dL      Hematocrit 38.9 %      MCV 87.8 fL      MCH 29.3 pg      MCHC 33.4 g/dL      RDW 13.2 %      RDW-SD 42.7 fl      MPV 9.3 fL      Platelets 332 10*3/mm3      Neutrophil % 60.6 %      Lymphocyte % 24.8 %      Monocyte % 8.5 %      Eosinophil % 4.7 %      Basophil % 1.2 %      Immature Grans % 0.2 %      Neutrophils, Absolute 5.67 10*3/mm3      Lymphocytes, Absolute 2.32 10*3/mm3      Monocytes, Absolute 0.80 10*3/mm3      Eosinophils, Absolute 0.44 10*3/mm3      Basophils, Absolute 0.11 10*3/mm3      Immature Grans, Absolute 0.02 10*3/mm3      nRBC 0.0 /100 WBC     Protime-INR [[id number]] Collected: 02/02/24 0011    Specimen: Blood Updated: 02/02/24 0143    aPTT [[id number]] Collected: 02/02/24 0011    Specimen: Blood Updated: 02/02/24 0143             Imaging:    XR Chest 1 View    Result Date: 2/2/2024  PROCEDURE: XR CHEST 1 VW  COMPARISON: 4/24/2023.  INDICATIONS: CHEST PAIN (TODAY).  FINDINGS:  A single AP (or PA) upright portable chest radiograph was performed.  Borderline cardiac enlargement is seen.  No acute infiltrate is appreciated.  No pleural effusion or pneumothorax is identified.  External artifacts obscure detail.  Chronic calcified granulomatous disease involves the chest.  The thoracic aorta is atherosclerotic.  There are postoperative changes of the right shoulder.  No significant interval change is seen since the prior study (or studies).        No acute infiltrate is appreciated.     Please note that portions of this note were completed with a voice recognition program.  JAIMIE GILLILAND JR, MD       Electronically Signed and Approved By: JAIMIE GILLILAND JR, MD on 2/02/2024 at 1:21                 Differential Diagnosis and Discussion:    Chest Pain:  Based on the patient's signs and symptoms, I considered aortic dissection, myocardial infaction, pulmonary embolism, cardiac tamponade, pericarditis, pneumothorax,  musculoskeletal chest pain and other differential diagnosis as an etiology of the patient's chest pain.     All labs were reviewed and interpreted by me.  All X-rays impressions were independently interpreted by me.  EKG was interpreted by me.    MDM     Amount and/or Complexity of Data Reviewed  Clinical lab tests: reviewed  Tests in the medicine section of CPT®: reviewed         This patient is a pleasant 73-year-old diabetic male with cardiac risk factors including hypertension, hyperlipidemia, tobacco abuse now presenting with acute onset of substernal chest pain rating to the left shoulder occurring at rest a couple hours prior to arrival.    Initial EKG shows old right bundle branch block but no acute ischemia.    We are checking troponin, giving aspirin and nitroglycerin.    Initial troponin is 29 which is indeterminately elevated, but he has a elevated heart score of 8 and still having chest pain.    I will plan to admit him to the hospital.  His chest pain got better after nitro initially, but then came back during my reassessment, and I am giving a second nitro and also some morphine and Zofran.    I will also start a heparin drip given my concern for possible NSTEMI and we will admit him to a monitored bed and see cardiology.      Critical Care Note: Total Critical Care time of 35 minutes. Total critical care time documented does not include time spent on separately billed procedures for services of nurses or physician assistants. I personally saw and examined the patient. I have reviewed all diagnostic interpretations and treatment plans as written. I was present for the key portions of any procedures performed and the inclusive time noted in any critical care statement. Critical care time includes patient management by me, time spent at the patients bedside,  time to review lab and imaging results, discussing patient care, documentation in the medical record, and time spent with family or  caregiver.        Patient Care Considerations:          Consultants/Shared Management Plan:    Hospitalist: I have discussed the case with the admitting hospitalist, who agrees to accept the patient for admission.    Social Determinants of Health:    Patient is independent, reliable, and has access to care.       Disposition and Care Coordination:    Admit:   Through independent evaluation of the patient's history, physical, and imperical data, the patient meets criteria for inpatient admission to the hospital.        Final diagnoses:   Chest pain, unspecified type   Unstable angina        ED Disposition       ED Disposition   Decision to Admit    Condition   --    Comment   --               This medical record created using voice recognition software.             Israel Neves MD  02/02/24 0144

## 2024-02-02 NOTE — DISCHARGE SUMMARY
[facility]         HOSPITALIST  DISCHARGE SUMMARY    Patient Name: Atilio Olson  : 1950  MRN: [id number]    Date of Admission: 2024  Date of Discharge:  2024  Primary Care Physician: Sahy Vora MD    Consults       Date and Time Order Name Status Description    2024  3:10 AM Inpatient Cardiology Consult Completed     2024  1:30 AM Hospitalist (on-call MD unless specified)              Active and Resolved Hospital Problems:  Chest pain MI ruled out, suspect GI source  Hypertension  Type 2 diabetes  Hyperlipidemia  History of CVA on dual antiplatelet therapy  GERD  Chronic degenerative disc disease  Chronic diastolic heart failure  Active Hospital Problems   No active problems to display.      Resolved Hospital Problems    Diagnosis POA    **NSTEMI (non-ST elevated myocardial infarction) [I21.4] Yes    Unstable angina [I20.0] Unknown       Hospital Course     Hospital Course:  Atilio Olson is a 73 y.o. male  past medical history of hypertension, type 2 diabetes, GERD, hyperlipidemia, CVA on dual antiplatelet therapy, degenerative disc disease, chronic arthritis, peripheral neuropathy who presents to the ER due to abrupt onset of chest pain.  Patient states that roughly around 10 PM tonight he felt a dull aching pain as if someone was sitting on his chest that radiated up to his neck and his left shoulder.  It was associate with some shortness of breath, diaphoresis and dizziness.  He attempted to drink some milk in case this was reflux related however did not improve and his pain progressively intensified to the point that his wife decided to bring him into the ER.  Upon arrival here he was found to be hemodynamically stable and lab workup is revealing of a borderline elevated troponin of 29.  Otherwise lab workup was unremarkable EKG was personally reviewed and showed sinus rhythm with a right bundle branch block pattern and nonspecific inferior repolarization  abnormality.  Patient had aspirin load and nitroglycerin given without much relief in his pain at which point patient was started on heparin drip for possible unstable angina versus NSTEMI.  The hospitalist was then contacted for admission.  Cardiology consulted.  Taken for left heart cath negative for any significant coronary stenosis. Echocardiogram showed normal ejection fraction with grade 1 diastolic dysfunction mild to moderately dilated right ventricle cavity with normal systolic function.  No valvular abnormalities appreciated.  CT angiogram negative for pulmonary embolism or aortic dissection.  Continued to have dull achy pain in the epigastric and substernal region following heart cath.  Patient given viscous lidocaine and Maalox p.o. with resolution of chest pain symptoms.  Patient eager to return home.  Patient seen and evaluated on date of discharge and thought stable for discharge home to follow-up with his primary care provider in 1 to 2 weeks.  Recommend stopping diclofenac continuing daily Protonix and starting Maalox as needed over-the-counter.  If symptoms recur refractory to Maalox and Protonix recommend referral for outpatient GI workup.        DISCHARGE Follow Up Recommendations for labs and diagnostics: As above      Day of Discharge     Vital Signs:  Temp:  [97.5 °F (36.4 °C)-98.6 °F (37 °C)] 98.6 °F (37 °C)  Heart Rate:  [63-74] 68  Resp:  [16-18] 18  BP: (107-132)/(50-64) 123/56  Flow (L/min):  [2] 2  Physical Exam:   Gen. well-developed appearing stated age in no acute distress  HEENT: Normocephalic atraumatic moist membranes pupils equal round reactive light, no scleral icterus no conjunctival injection  Cardiovascular: regular rate and rhythm no murmurs rubs or gallops S1-S2, no lower extremity edema appreciated  Pulmonary: Clear to auscultation bilaterally no wheezes rales or rhonchi symmetric chest expansion, unlabored, no conversational dyspnea appreciated  Gastrointestinal: Soft  nontender nondistended positive bowel sounds all 4 quadrants no rebound or guarding  Musculoskeletal: No clubbing cyanosis, warm and well-perfused, calves soft symmetric nontender bilaterally  Skin: Clean dry without rashs  Neuro: Cranial nerves II through XII intact grossly no sensorimotor deficits appreciated bilateral upper and lower extremities  Psych: Patient is calm cooperative and appropriate with exam not responding to internal stimuli  : No Carter catheter no bladder distention no suprapubic tenderness      Discharge Details        Discharge Medications        New Medications        Instructions Start Date   nicotine 14 MG/24HR patch  Commonly known as: NICODERM CQ   1 patch, Transdermal, Every 24 Hours             Continue These Medications        Instructions Start Date   aspirin 81 MG EC tablet   81 mg, Oral, Daily, DR. HAMM'S OFFICE ([facility]) TO NOTIFY PATIENT  IF AND WHEN TO HOLD MED PREOP.      atorvastatin 10 MG tablet  Commonly known as: LIPITOR   10 mg, Oral, Nightly      baclofen 10 MG tablet  Commonly known as: LIORESAL   10 mg, Oral, 3 Times Daily      clopidogrel 75 MG tablet  Commonly known as: PLAVIX   75 mg, Oral, Daily, Last dose 03/16/23      fish oil 1000 MG capsule capsule   1,000 mg, Oral, Daily With Breakfast      FOLBEE PLUS PO   1 tablet, Oral, Daily      gabapentin 800 MG tablet  Commonly known as: NEURONTIN   800 mg, Oral, 3 Times Daily      Loratadine 10 MG capsule   10 mg, Oral, Daily      losartan-HCTZ (HYZAAR) 100-12.5 combo dose   1 dose, Oral, Daily      metFORMIN 500 MG tablet  Commonly known as: GLUCOPHAGE   Take 1 tablet by mouth 2 (Two) Times a Day With Meals.      multivitamin with minerals tablet tablet   1 tablet, Oral, Daily      pantoprazole 20 MG EC tablet  Commonly known as: PROTONIX   40 mg, Oral, Daily      temazepam 15 MG capsule  Commonly known as: RESTORIL   15 mg, Nightly PRN      traMADol 50 MG tablet  Commonly known as: ULTRAM   50 mg, Oral, Every 8 Hours  PRN             Stop These Medications      diclofenac 75 MG EC tablet  Commonly known as: VOLTAREN              No Known Allergies    Discharge Disposition:  Home or Self Care    Diet:  Hospital:  Diet Order   Procedures    Diet: Cardiac Diets; Healthy Heart (2-3 Na+); Texture: Regular Texture (IDDSI 7); Fluid Consistency: Thin (IDDSI 0)       Discharge Activity:   Activity Instructions       Gradually Increase Activity Until at Pre-Hospitalization Level              CODE STATUS:  Code Status and Medical Interventions:   Ordered at: 24 0219     Code Status (Patient has no pulse and is not breathing):    CPR (Attempt to Resuscitate)     Medical Interventions (Patient has pulse or is breathing):    Full Support         Future Appointments   Date Time Provider Department Center   2024 10:00 AM Rupinder Hankins MD [facility]       Additional Instructions for the Follow-ups that You Need to Schedule       Discharge Follow-up with PCP   As directed       Currently Documented PCP:    Shay Vora MD    PCP Phone Number:    [phone]     Follow Up Details: Hospital discharge follow-up 1 to 2 weeks chest pain suspect GI source, MI ruled out                Pertinent  and/or Most Recent Results     PROCEDURES:   CARDIAC CATHETERIZATION PROCEDURE REPORT     Patient: Atilio Olson  : 1950  MRN: [id number]     Procedure Date:  24     Referring Physician:   Phil Galarza MD     Interventional Cardiologist:   Ty Rizvi MD     Indication:  Unstable angina     Clinical Presentation:  Mr. Olson, presented to the hospital because of sudden onset of chest pain at 10 PM last night.  There was radiation of pain to the left shoulder.  On presentation to the ER, EKGs were at baseline.  Multiple cardiac markers have been negative.  With working diagnosis of unstable angina, he was started on nitroglycerin and heparin infusion.  He continued to have significant chest pain, hence brought to  the Cath Lab to identify ischemic culprits.     Procedure performed:  Left heart catheterization  Selective coronary angiography  Left ventriculography     Access Sites:  Right radial artery     Findings:  1. Coronary Artery Anatomy:  Dominance: Right  Left Main: Normal with no stenosis  Left Anterior Descending artery: Radial artery and diagonal branches are free of any stenosis other than minimal luminal irregularities.  Left circumflex Artery: Large, nondominant vessel giving rise to OM branches.  LCx artery and branches are free of any stenosis.  Right Coronary Artery: Large dominant vessel giving rise to right PDA and PLV branches.  Entire RCA and branches are free of any stenosis.     2. Hemodynamics:  The opening aortic pressure is 86/50 with a mean of 67 mmHg.   The left ventricular end-diastolic pressure is 20 mmHg.  There was no gradient across aortic valve on pullback  The closing aortic pressure is 121/55 with a mean of 74 mmHg     3. Left Ventriculogram:  Ejection Fraction: 55-60%  Wall Motion: There are no regional wall motion abnormalities  Mitral Regurgitation: No significant mitral regurgitation noted     Conclusions:  Normal epicardial coronary anatomy with no angiographically significant stenosis noted in any of the arteries.  Mildly elevated left ventricular end-diastolic pressure.  Normal left ventricular systolic function with estimated LV ejection fraction of 55 to 60%.  There are no culprit lesions identified to explain patient's symptoms.     Recommendations:   Continue medical management, aggressive risk factor modification  We will proceed with CT chest with contrast to rule out pulmonary embolism/aortic dissection as the next step.        Procedure Details:  Informed consent was obtained with an explanation of the risks, benefits and alternatives of the procedure. The patient was brought to the Cardiac Catheterization Laboratory and was prepped and draped in a standard sterile fashion.  Moderate sedation with Fentanyl and Versed was administered by the circulating nurse. Lidocaine 2% was used to anesthetize the right radial artery and a 5/6 Slender sheath was placed.  A 5 F TGR catheter was then advanced over a 0.035 guidewire into the ascending aorta.  This catheter was used to engage the right and left coronary arteries with diagnostic angiography obtained in all appropriate projections by injection of non-ionic contrast. We then exchanged for an angled pigtail catheter and enter the left ventricle to measure hemodynamics and perform a left ventriculogram.  The catheter was then removed over a guidewire. The radial artery sheath was removed without difficulty and TR Band was placed over the access site with excellent hemostasis.     Patient tolerated the procedure well without any complications, and transferred to the post procedure area for recovery in a stable condition.     Cumulative fluoroscopy time: 3.8 min      Cumulative air kerma: 301 mGy     Total amount of contrast used: 69 ml of Isovue         Complications:  None.     Estimated Blood Loss:  Minimal.     Ty Rizvi MD     02/02/24  09:36 EST     Recommendations         Evaluate for non-cardiac causes of chest pain.       LAB RESULTS:      Lab 02/02/24  0823 02/02/24  0203 02/02/24  0011   WBC  --   --  9.36   HEMOGLOBIN  --   --  13.0   HEMATOCRIT  --   --  38.9   PLATELETS  --   --  332   NEUTROS ABS  --   --  5.67   IMMATURE GRANS (ABS)  --   --  0.02   LYMPHS ABS  --   --  2.32   MONOS ABS  --   --  0.80   EOS ABS  --   --  0.44*   MCV  --   --  87.8   PROTIME 13.3  --  12.6   APTT 59.6* 27.4* 27.4*   D DIMER QUANT  --  0.64  --          Lab 02/02/24  0011   SODIUM 140   POTASSIUM 4.1   CHLORIDE 103   CO2 24.9   ANION GAP 12.1   BUN 21   CREATININE 1.19   EGFR 64.9   GLUCOSE 125*   CALCIUM 9.8   MAGNESIUM 2.2         Lab 02/02/24  0011   TOTAL PROTEIN 6.5   ALBUMIN 4.1   GLOBULIN 2.4   ALT (SGPT) 31   AST (SGOT) 20   BILIRUBIN  0.3   ALK PHOS 130*   LIPASE 127*         Lab 02/02/24  0823 02/02/24  0203 02/02/24  0011   PROBNP  --   --  <36.0   HSTROP T  --  27* 29*   PROTIME 13.3  --  12.6   INR 0.99  --  0.93                 Brief Urine Lab Results  (Last result in the past 365 days)        Color   Clarity   Blood   Leuk Est   Nitrite   Protein   CREAT   Urine HCG        01/10/24 0920 Dark Yellow   Clear   Negative   Trace   Negative   Trace                 Microbiology Results (last 10 days)       ** No results found for the last 240 hours. **            CT Angiogram Chest    Result Date: 2/2/2024  Impression:    1. No evidence of pulmonary embolus.  2. No evidence of aortic dissection or aneurysm.  3. Multi-vessel coronary and other vascular calcification.  4. There is dilatation of the central pulmonary arteries suggesting pulmonary hypertension.  There is cardiomegaly.  5. Additional findings as given above.      RAHEEM WELCH MD       Electronically Signed and Approved By: RAHEEM WELCH MD on 2/02/2024 at 13:18             XR Chest 1 View    Result Date: 2/2/2024  Impression:   No acute infiltrate is appreciated.     Please note that portions of this note were completed with a voice recognition program.  JAIMIE GILLILAND JR, MD       Electronically Signed and Approved By: JAIMIE GILLILAND JR, MD on 2/02/2024 at 1:21                       Results for orders placed during the hospital encounter of 02/02/24    Adult Transthoracic Echo Complete W/ Cont if Necessary Per Protocol    Interpretation Summary    Left ventricular systolic function is normal. Left ventricular ejection fraction appears to be 56 - 60%.    Left ventricular diastolic function is consistent with (grade I) impaired relaxation.    The right ventricular cavity is mild to moderately dilated, with a normal systolic function.    There are no significant valvular abnormalities.    Unable to calculate pulmonary artery systolic pressure due to incomplete TR Doppler  envelope.      Labs Pending at Discharge:        Time spent on Discharge including face to face service: Greater than 35 minutes    Electronically signed by Hugh Edwards MD, 02/02/24, 4:49 PM EST.

## 2024-02-03 LAB
QT INTERVAL: 399 MS
QT INTERVAL: 399 MS
QT INTERVAL: 403 MS
QT INTERVAL: 425 MS
QTC INTERVAL: 434 MS
QTC INTERVAL: 437 MS
QTC INTERVAL: 440 MS
QTC INTERVAL: 446 MS

## 2024-02-03 NOTE — OUTREACH NOTE
Prep Survey      Flowsheet Row Responses   [facility] discharged from? [facility]   Is LACE score < 7 ? No   Eligibility Readm Mgmt   Discharge diagnosis NSTEMI (non-ST elevated myocardial infarction)   Does the patient have one of the following disease processes/diagnoses(primary or secondary)? Acute MI (STEMI,NSTEMI)   Does the patient have Home health ordered? No   Is there a DME ordered? No   Prep survey completed? Yes            Radha [facility] - Registered Nurse

## 2024-02-06 ENCOUNTER — READMISSION MANAGEMENT (OUTPATIENT)
Dept: CALL CENTER | Facility: HOSPITAL | Age: 74
End: 2024-02-06
Payer: MEDICARE

## 2024-02-06 NOTE — OUTREACH NOTE
AMI Week 1 Survey      Flowsheet Row Responses   [facility] patient discharged from? [facility]   Does the patient have one of the following disease processes/diagnoses(primary or secondary)? Acute MI (STEMI,NSTEMI)   Week 1 attempt successful? Yes   Call start time 1418   Call end time 1431   Discharge diagnosis NSTEMI (non-ST elevated myocardial infarction)   Is patient permission given to speak with other caregiver? Yes   List who call center can speak with wife   Person spoke with today (if not patient) and relationship wife and pt   Meds reviewed with patient/caregiver? Yes   Is the patient having any side effects they believe may be caused by any medication additions or changes? No   Does the patient have all prescriptions related to this admission filled (includes statins,anticoagulants,HTN meds,anti-arrhythmia meds) No   What is keeping the patient from filling the prescriptions? Lost script/didn't receive   Nursing Interventions Nurse provided patient education   Prescription comments Pt reports the Nicotine patches were not covered by insurance at pharmacy. Pt has [employer] benefits, will check with VA to see if covered at their pharm.   Is the patient taking all medications as directed (includes completed medication regime)? No   What is preventing the patient from taking all medications as directed? Other   Nursing Interventions Nurse provided patient education   Does the patient have a primary care provider?  Yes   Does the patient have an appointment with their PCP,cardiologist,or clinic within 7 days of discharge? No   What is preventing the patient from scheduling follow up appointments within 7 days of discharge? Haven't had time   Nursing Interventions Advised patient to make appointment   Comments Per wife the pt is outside walking horses during call, c/o ongoing pain on right testicular area around to flank, having futher testing soon, present prior to hosp stay, pt reports.Pt denies dysuria at this  time. R. radial site bruised. Pt c/o arthritis symptoms on hands since DC, nurse advised to call PCP, pt v/u. Pt denies CP, SOA or other issues at this time.   Did the patient receive a copy of their discharge instructions? Yes   Nursing interventions Reviewed instructions with patient   What is the patient's perception of their health status since discharge? New symptoms unrelated to diagnosis  [pt reports hand pain, redness, edema since stopping arthritis medication, pt reports]   Nursing interventions Nurse provided patient education   Is the patient/caregiver able to teach back signs and symptoms of when to call for help immediately: Sudden chest discomfort, Shortness of breath at any time, Sudden sweating or clammy skin, Nausea or vomiting   Nursing interventions Nurse provided patient education   Is the patient/caregiver able to teach back lifestyle changes to help prevent MIs Quit smoking, Heart healthy diet   Is the patient/caregiver able to teach back ways to prevent a second heart attack: Take medications, Follow up with MD   If the patient is a current smoker, are they able to teach back resources for cessation? Not a smoker   Is the patient/caregiver able to teach back the hierarchy of who to call/visit for symptoms/problems? PCP, Specialist, Home health nurse, Urgent Care, ED, 911 Yes   Week 1 call completed? Yes   Revoked No further contact(revokes)-requires comment   Call end time 1431            Fouzia GOMEZ - Registered Nurse

## 2024-02-08 NOTE — TELEPHONE ENCOUNTER
It is not noted on request how long patient should discontinue blood thinner prior to surgery. Will have to call their office/refax form for clarification.    no

## 2024-02-12 ENCOUNTER — HOSPITAL ENCOUNTER (OUTPATIENT)
Dept: GENERAL RADIOLOGY | Facility: HOSPITAL | Age: 74
Discharge: HOME | End: 2024-02-12
Admitting: NURSE PRACTITIONER
Payer: MEDICARE

## 2024-02-12 ENCOUNTER — TRANSCRIBE ORDERS (OUTPATIENT)
Dept: ADMINISTRATIVE | Facility: HOSPITAL | Age: 74
End: 2024-02-12
Payer: MEDICARE

## 2024-02-12 DIAGNOSIS — M54.2 CERVICALGIA: ICD-10-CM

## 2024-02-12 DIAGNOSIS — R19.03 RIGHT LOWER QUADRANT ABDOMINAL MASS: ICD-10-CM

## 2024-02-12 DIAGNOSIS — R19.03 RIGHT LOWER QUADRANT ABDOMINAL MASS: Primary | ICD-10-CM

## 2024-02-12 PROCEDURE — 74022 RADEX COMPL AQT ABD SERIES: CPT

## 2024-02-15 ENCOUNTER — APPOINTMENT (OUTPATIENT)
Dept: CT IMAGING | Facility: HOSPITAL | Age: 74
End: 2024-02-15
Payer: MEDICARE

## 2024-02-15 ENCOUNTER — HOSPITAL ENCOUNTER (EMERGENCY)
Facility: HOSPITAL | Age: 74
Discharge: HOME | End: 2024-02-16
Attending: EMERGENCY MEDICINE
Payer: MEDICARE

## 2024-02-15 DIAGNOSIS — K59.00 CONSTIPATION, UNSPECIFIED CONSTIPATION TYPE: ICD-10-CM

## 2024-02-15 DIAGNOSIS — R10.31 RIGHT LOWER QUADRANT ABDOMINAL PAIN: Primary | ICD-10-CM

## 2024-02-15 LAB
ALBUMIN SERPL-MCNC: 3.9 G/DL (ref 3.5–5.2)
ALBUMIN/GLOB SERPL: 1.6 G/DL
ALP SERPL-CCNC: 118 U/L (ref 39–117)
ALT SERPL W P-5'-P-CCNC: 28 U/L (ref 1–41)
ANION GAP SERPL CALCULATED.3IONS-SCNC: 10.6 MMOL/L (ref 5–15)
AST SERPL-CCNC: 18 U/L (ref 1–40)
BACTERIA UR QL AUTO: NORMAL /HPF
BASOPHILS # BLD AUTO: 0.09 10*3/MM3 (ref 0–0.2)
BASOPHILS NFR BLD AUTO: 1.2 % (ref 0–1.5)
BILIRUB SERPL-MCNC: 0.2 MG/DL (ref 0–1.2)
BILIRUB UR QL STRIP: NEGATIVE
BUN SERPL-MCNC: 22 MG/DL (ref 8–23)
BUN/CREAT SERPL: 22.4 (ref 7–25)
CALCIUM SPEC-SCNC: 9.4 MG/DL (ref 8.6–10.5)
CHLORIDE SERPL-SCNC: 100 MMOL/L (ref 98–107)
CLARITY UR: CLEAR
CO2 SERPL-SCNC: 24.4 MMOL/L (ref 22–29)
COLOR UR: YELLOW
CREAT SERPL-MCNC: 0.98 MG/DL (ref 0.76–1.27)
D-LACTATE SERPL-SCNC: 1.2 MMOL/L (ref 0.5–2)
DEPRECATED RDW RBC AUTO: 41.3 FL (ref 37–54)
EGFRCR SERPLBLD CKD-EPI 2021: 81.9 ML/MIN/1.73
EOSINOPHIL # BLD AUTO: 0.39 10*3/MM3 (ref 0–0.4)
EOSINOPHIL NFR BLD AUTO: 5.4 % (ref 0.3–6.2)
ERYTHROCYTE [DISTWIDTH] IN BLOOD BY AUTOMATED COUNT: 13 % (ref 12.3–15.4)
GLOBULIN UR ELPH-MCNC: 2.5 GM/DL
GLUCOSE SERPL-MCNC: 134 MG/DL (ref 65–99)
GLUCOSE UR STRIP-MCNC: NEGATIVE MG/DL
HCT VFR BLD AUTO: 36.1 % (ref 37.5–51)
HGB BLD-MCNC: 12.2 G/DL (ref 13–17.7)
HGB UR QL STRIP.AUTO: NEGATIVE
HOLD SPECIMEN: NORMAL
HOLD SPECIMEN: NORMAL
HYALINE CASTS UR QL AUTO: NORMAL /LPF
IMM GRANULOCYTES # BLD AUTO: 0.02 10*3/MM3 (ref 0–0.05)
IMM GRANULOCYTES NFR BLD AUTO: 0.3 % (ref 0–0.5)
KETONES UR QL STRIP: NEGATIVE
LEUKOCYTE ESTERASE UR QL STRIP.AUTO: ABNORMAL
LIPASE SERPL-CCNC: 33 U/L (ref 13–60)
LYMPHOCYTES # BLD AUTO: 1.51 10*3/MM3 (ref 0.7–3.1)
LYMPHOCYTES NFR BLD AUTO: 20.9 % (ref 19.6–45.3)
MCH RBC QN AUTO: 29.5 PG (ref 26.6–33)
MCHC RBC AUTO-ENTMCNC: 33.8 G/DL (ref 31.5–35.7)
MCV RBC AUTO: 87.2 FL (ref 79–97)
MONOCYTES # BLD AUTO: 0.67 10*3/MM3 (ref 0.1–0.9)
MONOCYTES NFR BLD AUTO: 9.3 % (ref 5–12)
NEUTROPHILS NFR BLD AUTO: 4.54 10*3/MM3 (ref 1.7–7)
NEUTROPHILS NFR BLD AUTO: 62.9 % (ref 42.7–76)
NITRITE UR QL STRIP: NEGATIVE
NRBC BLD AUTO-RTO: 0 /100 WBC (ref 0–0.2)
PH UR STRIP.AUTO: 5.5 [PH] (ref 5–8)
PLATELET # BLD AUTO: 297 10*3/MM3 (ref 140–450)
PMV BLD AUTO: 9 FL (ref 6–12)
POTASSIUM SERPL-SCNC: 4 MMOL/L (ref 3.5–5.2)
PROT SERPL-MCNC: 6.4 G/DL (ref 6–8.5)
PROT UR QL STRIP: NEGATIVE
RBC # BLD AUTO: 4.14 10*6/MM3 (ref 4.14–5.8)
RBC # UR STRIP: NORMAL /HPF
REF LAB TEST METHOD: NORMAL
SODIUM SERPL-SCNC: 135 MMOL/L (ref 136–145)
SP GR UR STRIP: 1.02 (ref 1–1.03)
SQUAMOUS #/AREA URNS HPF: NORMAL /HPF
UROBILINOGEN UR QL STRIP: ABNORMAL
WBC # UR STRIP: NORMAL /HPF
WBC NRBC COR # BLD AUTO: 7.22 10*3/MM3 (ref 3.4–10.8)
WHOLE BLOOD HOLD COAG: NORMAL
WHOLE BLOOD HOLD SPECIMEN: NORMAL

## 2024-02-15 PROCEDURE — 25010000002 HYDROMORPHONE 1 MG/ML SOLUTION: Performed by: EMERGENCY MEDICINE

## 2024-02-15 PROCEDURE — 83690 ASSAY OF LIPASE: CPT

## 2024-02-15 PROCEDURE — 96374 THER/PROPH/DIAG INJ IV PUSH: CPT

## 2024-02-15 PROCEDURE — 85025 COMPLETE CBC W/AUTO DIFF WBC: CPT

## 2024-02-15 PROCEDURE — 99285 EMERGENCY DEPT VISIT HI MDM: CPT

## 2024-02-15 PROCEDURE — 81001 URINALYSIS AUTO W/SCOPE: CPT

## 2024-02-15 PROCEDURE — 80053 COMPREHEN METABOLIC PANEL: CPT

## 2024-02-15 PROCEDURE — 83605 ASSAY OF LACTIC ACID: CPT

## 2024-02-15 PROCEDURE — 74177 CT ABD & PELVIS W/CONTRAST: CPT

## 2024-02-15 PROCEDURE — 96375 TX/PRO/DX INJ NEW DRUG ADDON: CPT

## 2024-02-15 PROCEDURE — 25010000002 MORPHINE PER 10 MG: Performed by: EMERGENCY MEDICINE

## 2024-02-15 PROCEDURE — 25510000001 IOPAMIDOL PER 1 ML: Performed by: EMERGENCY MEDICINE

## 2024-02-15 RX ORDER — SODIUM CHLORIDE 0.9 % (FLUSH) 0.9 %
10 SYRINGE (ML) INJECTION AS NEEDED
Status: DISCONTINUED | OUTPATIENT
Start: 2024-02-15 | End: 2024-02-16 | Stop reason: HOSPADM

## 2024-02-15 RX ADMIN — HYDROMORPHONE HYDROCHLORIDE 1 MG: 1 INJECTION, SOLUTION INTRAMUSCULAR; INTRAVENOUS; SUBCUTANEOUS at 22:16

## 2024-02-15 RX ADMIN — MORPHINE SULFATE 4 MG: 4 INJECTION, SOLUTION INTRAMUSCULAR; INTRAVENOUS at 20:51

## 2024-02-15 RX ADMIN — IOPAMIDOL 85 ML: 755 INJECTION, SOLUTION INTRAVENOUS at 20:41

## 2024-02-16 VITALS
SYSTOLIC BLOOD PRESSURE: 133 MMHG | BODY MASS INDEX: 30.15 KG/M2 | TEMPERATURE: 98.1 F | RESPIRATION RATE: 18 BRPM | HEIGHT: 64 IN | HEART RATE: 68 BPM | OXYGEN SATURATION: 92 % | WEIGHT: 176.59 LBS | DIASTOLIC BLOOD PRESSURE: 54 MMHG

## 2024-02-16 NOTE — ED NOTES
Placed pt on 2 liters nasal cannula. Pt responded appropriately. Pt still complaining of pain. Provider aware and placed orders.

## 2024-02-16 NOTE — ED PROVIDER NOTES
Time: 12:34 AM EST  Date of encounter:  2/15/2024  Independent Historian/Clinical History and Information was obtained by:   Patient    History is limited by: N/A    Chief Complaint: Abdominal pain      History of Present Illness:  Patient is a 73 y.o. year old male who presents to the emergency department for evaluation of right-sided abdominal pain.  Patient's pain started last Sunday and he saw his primary care provider on Monday.  An x-ray was obtained of the abdomen on 2/12/24 which showed a nonobstructive bowel gas pattern and moderate colonic stool burden within the ascending colon.    HPI    Patient Care Team  Primary Care Provider: Shay Vora MD    Past Medical History:     No Known Allergies  Past Medical History:   Diagnosis Date    Abnormal ECG [id number]    Acid reflux     Arthritis     Arthritis of back     Bladder disorder     Cervical disc disorder     Claustrophobia     Congestive heart failure (CHF)     UNSURE OF LAST EPISODE, NO CURRENT ISSUES, FOLL[facility]    Deep vein thrombosis 2006    right arm    Diabetes     Fracture, foot     High cholesterol     Hypertension     Limb swelling     Low back pain     Lumbosacral disc disease     Neurogenic bladder 01/18/2017    NSTEMI (non-ST elevated myocardial infarction) 2/2/2024    Periarthritis of shoulder     Peripheral neuropathy     Stroke     x2, last episode 2006, left side slight weakness    Tear of meniscus of knee     Tendonitis of shoulder 02/19/2018    ROTATOR CUFF RIGHT      Past Surgical History:   Procedure Laterality Date    BACK SURGERY      CARDIAC CATHETERIZATION N/A 2/2/2024    Procedure: Left Heart Cath;  Surgeon: Ty Rizvi MD;  Location: [facility] INVASIVE LOCATION;  Service: Cardiovascular;  Laterality: N/A;    COLONOSCOPY      EXCISION      CYST EXCISION    FOOT SURGERY      GALLBLADDER SURGERY      HAND SURGERY      HERNIA REPAIR      JOINT REPLACEMENT      LUMBAR DISCECTOMY Left 03/22/2023     Procedure: MINIMALLY INVASIVE LUMBAR LAMINECTOMY, left approach, lumbar 4-lumbar 5;  Surgeon: Ajit Staley MD;  Location: [facility] MAIN OR;  Service: Neurosurgery;  Laterality: Left;    OTHER SURGICAL HISTORY      METAL IMPLANT    OTHER SURGICAL HISTORY      ARTIFICAL JOINTS/LIMBS    OTHER SURGICAL HISTORY      JOINT SURGERY    REPLACEMENT TOTAL KNEE Left     REPLACEMENT TOTAL KNEE Right     X2    SHOULDER SURGERY      TONSILLECTOMY      WRIST SURGERY       Family History   Problem Relation Age of Onset    Arthritis Mother     Heart disease Mother     Diabetes Mother     Stroke Father     Heart disease Father     Diabetes Father     Arthritis Father     Stroke Sister     Diabetes Sister     Diabetes Son     Malig Hyperthermia Neg Hx        Home Medications:  Prior to Admission medications    Medication Sig Start Date End Date Taking? Authorizing Provider   aspirin 81 MG EC tablet Take 1 tablet by mouth Daily. DR. STALEY'S OFFICE ([facility]) TO NOTIFY PATIENT  IF AND WHEN TO HOLD MED PREOP.    Provider, MD Margarita   atorvastatin (LIPITOR) 10 MG tablet Take 1 tablet by mouth Every Night.    Emergency, Nurse Epic, RN   B Complex-C-Folic Acid (FOLBEE PLUS PO) Take 1 tablet by mouth Daily.    Emergency, Nurse Juliano RN   baclofen (LIORESAL) 10 MG tablet Take 1 tablet by mouth 3 (Three) Times a Day.    Emergency, Nurse Juliano RN   clopidogrel (PLAVIX) 75 MG tablet Take 1 tablet by mouth Daily. Last dose 03/16/23  Indications: Resume on 3/24/23    Emergency, Nurse Epic, RN   gabapentin (NEURONTIN) 800 MG tablet Take 1 tablet by mouth 3 (Three) Times a Day.    Emergency, Nurse Juliano RN   Loratadine 10 MG capsule Take 1 capsule by mouth Daily.    Emergency, Nurse Epic, RN   losartan-HCTZ (HYZAAR) 100-12.5 combo dose Take 1 dose by mouth Daily.    Provider, MD Margarita   metFORMIN (GLUCOPHAGE) 500 MG tablet Take 1 tablet by mouth 2 (Two) Times a Day With Meals.    Emergency, Nurse JIMBO Henao   multivitamin with minerals  "tablet tablet Take 1 tablet by mouth Daily.    Provider, MD Margarita   nicotine (NICODERM CQ) 14 MG/24HR patch Place 1 patch on the skin as directed by provider Daily for 30 days. 2/2/24 3/3/24  Hugh Edwards MD   Omega-3 Fatty Acids (fish oil) 1000 MG capsule capsule Take 1 capsule by mouth Daily With Breakfast.    Emergency, Nurse JIMBO Henao   pantoprazole (PROTONIX) 20 MG EC tablet Take 2 tablets by mouth Daily.    Emergency, Nurse Juliano RN   temazepam (RESTORIL) 15 MG capsule 1 capsule At Night As Needed for Sleep or Anxiety.    Emergency, Nurse Juliano RN   traMADol (ULTRAM) 50 MG tablet Take 1 tablet by mouth Every 8 (Eight) Hours As Needed for Moderate Pain.    Provider, MD Margarita        Social History:   Social History     Tobacco Use    Smoking status: Some Days     Packs/day: 0.50     Years: 30.00     Additional pack years: 0.00     Total pack years: 15.00     Types: Cigarettes    Smokeless tobacco: Never    Tobacco comments:     Last was 2 weeks ago   Vaping Use    Vaping Use: Never used   Substance Use Topics    Alcohol use: Not Currently     Comment: FORMER . DRINKS IN THE PAST     Drug use: Never         Review of Systems:  Review of Systems   Gastrointestinal:  Positive for abdominal pain and nausea.        Physical Exam:  /54   Pulse 68   Temp 98.1 °F (36.7 °C) (Oral)   Resp 18   Ht 162.6 cm (64\")   Wt 80.1 kg (176 lb 9.4 oz)   SpO2 92%   BMI 30.31 kg/m²     Physical Exam  Vitals and nursing note reviewed.   Constitutional:       General: He is not in acute distress.  Cardiovascular:      Rate and Rhythm: Normal rate and regular rhythm.      Heart sounds: Normal heart sounds.   Pulmonary:      Effort: Pulmonary effort is normal. No respiratory distress.      Breath sounds: Normal breath sounds.   Abdominal:      General: Abdomen is flat.      Palpations: Abdomen is soft.      Tenderness: There is no abdominal tenderness.   Musculoskeletal:         General: Normal range of motion. "      Cervical back: Normal range of motion and neck supple.   Skin:     General: Skin is warm and dry.   Neurological:      Mental Status: He is alert and oriented to person, place, and time. Mental status is at baseline.                  Procedures:  Procedures      Medical Decision Making:      Comorbidities that affect care:    Diabetes    External Notes reviewed:    Hospital Discharge Summary: Discharged to 2/2/24 after admission for chest pain.  Heart cath was negative for significant blockage      The following orders were placed and all results were independently analyzed by me:  Orders Placed This Encounter   Procedures    CT Abdomen Pelvis With Contrast    [place] Draw    Comprehensive Metabolic Panel    Lipase    Urinalysis With Microscopic If Indicated (No Culture) - Urine, Clean Catch    Lactic Acid, Plasma    CBC Auto Differential    Urinalysis, Microscopic Only - Urine, Clean Catch    CBC & Differential    Green Top (Gel)    Lavender Top    Gold Top - SST    Light Blue Top       Medications Given in the Emergency Department:  Medications   iopamidol (ISOVUE-370) 76 % injection 100 mL (85 mL Intravenous Given 2/15/24 2041)   morphine injection 4 mg (4 mg Intravenous Given 2/15/24 2051)   HYDROmorphone (DILAUDID) injection 1 mg (1 mg Intravenous Given 2/15/24 2216)        ED Course:         Labs:    Lab Results (last 24 hours)       ** No results found for the last 24 hours. **             Imaging:    No Radiology Exams Resulted Within Past 24 Hours      Differential Diagnosis and Discussion:    Abdominal Pain: Based on the patient's signs and symptoms, I considered abdominal aortic aneurysm, small bowel obstruction, pancreatitis, acute cholecystitis, acute appendecitis, peptic ulcer disease, gastritis, colitis, endocrine disorders, irritable bowel syndrome and other differential diagnosis an etiology of the patient's abdominal pain.    All labs were reviewed and interpreted by me.  EKG was interpreted  by me.  CT scan radiology impression was interpreted by me.    MDM  The patient is resting comfortably and feels better, is alert and in no distress. Repeat examination is unremarkable and benign; in particular, there's no discomfort at McBurney's point and there is no pulsatile mass. The history, exam, diagnostic testing, and current condition does not suggest acute appendicitis, bowel obstruction, acute cholecystitis, bowel perforation, major gastrointestinal bleeding, severe diverticulitis, abdominal aortic aneurysm, mesenteric ischemia, volvulus, sepsis, or other significant pathology that warrants further testing, continued ED treatment, admission, for surgical evaluation at this point. The vital signs have been stable. The patient does not have uncontrollable pain, intractable vomiting, or other significant symptoms. The patient's condition is stable and appropriate for discharge from the emergency department.      Patient Care Considerations:    NARCOTICS: I considered prescribing opiate pain medication as an outpatient, however it was contraindicated due to the patient's constipation      Consultants/Shared Management Plan:    None    Social Determinants of Health:    Patient is independent, reliable, and has access to care.       Disposition and Care Coordination:    Discharged: The patient is suitable and stable for discharge with no need for consideration of admission.    I have explained the patient´s condition, diagnoses and treatment plan based on the information available to me at this time. I have answered questions and addressed any concerns. The patient has a good  understanding of the patient´s diagnosis, condition, and treatment plan as can be expected at this point. The vital signs have been stable. The patient´s condition is stable and appropriate for discharge from the emergency department.      The patient will pursue further outpatient evaluation with the primary care physician or other  designated or consulting physician as outlined in the discharge instructions. They are agreeable to this plan of care and follow-up instructions have been explained in detail. The patient has received these instructions in written format and has expressed an understanding of the discharge instructions. The patient is aware that any significant change in condition or worsening of symptoms should prompt an immediate return to this or the closest emergency department or call to 911.    Final diagnoses:   Right lower quadrant abdominal pain   Constipation, unspecified constipation type        ED Disposition       ED Disposition   Discharge    Condition   Stable    Comment   --               This medical record created using voice recognition software.             Terell Swanson DO  02/21/24 [id number]

## 2024-02-16 NOTE — ED NOTES
Pt comes to the ER tonight by EMS for right lower abd pain. Pt states that he has a mass in his right lower abd and went to the doctor earlier this week and they diagnosed him with constipation by XRAY. Pt states that today the pain got worse and he can't stand up. EMS gave 50mcg of fentanyl in route.

## 2024-02-16 NOTE — DISCHARGE INSTRUCTIONS
May take Motrin and/or Tylenol as needed for pain.    Recommend drinking 1 entire bottle of over-the-counter magnesium citrate to help facilitate a bowel movement.

## 2024-02-23 RX ORDER — DIAZEPAM 5 MG/1
TABLET ORAL
COMMUNITY
Start: 2024-02-14

## 2024-02-27 ENCOUNTER — PROCEDURE VISIT (OUTPATIENT)
Dept: UROLOGY | Facility: CLINIC | Age: 74
End: 2024-02-27
Payer: MEDICARE

## 2024-02-27 VITALS
HEART RATE: 64 BPM | WEIGHT: 168.4 LBS | RESPIRATION RATE: 16 BRPM | DIASTOLIC BLOOD PRESSURE: 84 MMHG | SYSTOLIC BLOOD PRESSURE: 169 MMHG | BODY MASS INDEX: 28.75 KG/M2 | HEIGHT: 64 IN

## 2024-02-27 DIAGNOSIS — R39.12 BENIGN PROSTATIC HYPERPLASIA WITH WEAK URINARY STREAM: ICD-10-CM

## 2024-02-27 DIAGNOSIS — N40.1 BENIGN PROSTATIC HYPERPLASIA WITH WEAK URINARY STREAM: ICD-10-CM

## 2024-02-27 DIAGNOSIS — R31.0 GROSS HEMATURIA: Primary | ICD-10-CM

## 2024-02-27 RX ORDER — DOXAZOSIN 2 MG/1
2 TABLET ORAL DAILY
Qty: 60 TABLET | Refills: 0 | Status: SHIPPED | OUTPATIENT
Start: 2024-02-27 | End: 2024-04-27

## 2024-02-27 NOTE — PROGRESS NOTES
Preprocedure diagnosis  Hematuria    Postprocedure diagnosis  Same as above    Procedure  Flexible Cystourethroscopy    Attending surgeon  Rupinder Hankins MD    Anesthesia  2% lidocaine jelly intraurethrally    Complications  None    Indications  73 y.o. male undergoing a flexible cystoscopy for the above mentioned indications.      CT abdomen pelvis with contrast 2/15/2024 performed prior to this visit.  Stable left rigid lip adenoma; right renal cyst, enlarged prostate gland; no urinary bladder thickening.  Fat-containing left inguinal hernia.  Prostate gland measured and calculated out to 110 g approximately.    Presents for lower urinary tract evaluation.  Patient reports low flow, takes a long time for him to empty.  Not on any prostate medications.    Informed consent was obtained.      Findings  Severe prostatomegaly with bilateral coapting lateral lobes, some varicosities, elongated prostatic urethra; no median lobe or significant intravesical prostate tissue.  Cystoscopy revealed normal capacity bladder which was decompressed upon entry, one right and left ureteral orifice in the normal anatomic position, normal bladder mucosa and no tumors, masses or stones.      Procedure  The patient was placed in supine position and prepped and draped in sterile fashion with lidocaine jelly per urethra for anesthesia.  A timeout was performed.  The 14F flexible cystoscope was lubricated and gently placed through the urethra and into the bladder.  The bladder was completely visualized.  The cystoscope was retroflexed and the bladder neck visualized. Findings were as above. The scope was withdrawn and the procedure terminated.  The patient tolerated the procedure well.        Plan:  Tolerated procedure well.  Instructions provided.  Cystoscopic findings discussed with patient include severe prostatomegaly with varicosity; normal-appearing bladder.  Negative hematuria workup, no evidence of malignancy.  Recommend trial  of BPH medication, Cardura 2 mg daily.  Side effects discussed including GI upset, dizziness, lightheadedness.  Agreeable to trial, sent to pharmacy.  Patient to follow-up in 2-3 months, PVR  All questions addressed

## 2024-03-22 ENCOUNTER — HOSPITAL ENCOUNTER (OUTPATIENT)
Dept: OTHER | Facility: HOSPITAL | Age: 74
Discharge: HOME | End: 2024-03-22

## 2024-03-22 ENCOUNTER — OFFICE VISIT (OUTPATIENT)
Dept: NEUROSURGERY | Facility: CLINIC | Age: 74
End: 2024-03-22
Payer: MEDICARE

## 2024-03-22 VITALS
DIASTOLIC BLOOD PRESSURE: 59 MMHG | HEIGHT: 64 IN | HEART RATE: 74 BPM | BODY MASS INDEX: 28.17 KG/M2 | SYSTOLIC BLOOD PRESSURE: 140 MMHG | WEIGHT: 165 LBS

## 2024-03-22 DIAGNOSIS — M48.02 FORAMINAL STENOSIS OF CERVICAL REGION: ICD-10-CM

## 2024-03-22 DIAGNOSIS — M79.602 LEFT ARM PAIN: ICD-10-CM

## 2024-03-22 DIAGNOSIS — M48.02 SPINAL STENOSIS IN CERVICAL REGION: Primary | ICD-10-CM

## 2024-03-22 DIAGNOSIS — M50.222 HERNIATED NUCLEUS PULPOSUS, C5-6: ICD-10-CM

## 2024-03-22 DIAGNOSIS — R20.0 NUMBNESS AND TINGLING IN LEFT ARM: ICD-10-CM

## 2024-03-22 DIAGNOSIS — M50.221 HERNIATED NUCLEUS PULPOSUS, C4-5: ICD-10-CM

## 2024-03-22 DIAGNOSIS — R20.2 NUMBNESS AND TINGLING IN LEFT ARM: ICD-10-CM

## 2024-03-22 DIAGNOSIS — M50.21 HERNIATED NUCLEUS PULPOSUS, C3-4: ICD-10-CM

## 2024-03-22 DIAGNOSIS — M54.2 CERVICALGIA: ICD-10-CM

## 2024-03-22 NOTE — PROGRESS NOTES
"Chief Complaint  Neck Pain, Shoulder Pain (Left ), Arm Pain (Left shoulder to elbow ), Tingling (Left jaw, left side of neck and left shoulder ), and Numbness (Left shoulder to elbow )    Subjective          Atilio Olson who is a 73 y.o. year old male who presents to [facility] for Evaluation of the Spine.    Previously: Last seen on 4/18/2023 by Dr. Staley for back and leg pain.  He was status post minimally invasive lumbar laminectomy using a left approach at L4-L5 on 3/22/2023.  At that time his pain overall was doing well with decreased pain into the posterior thighs.  He was having left foot pain which was present before surgery and he was planning to have a foot surgery once cleared.  There is plan to follow-up for any new leg pain.  Dr. Staley encourage strengthening of the lower lumbar.    Today: He presents for new referral for degenerative disc disease in the cervical spine.    The patient complains of pain located in the Cervical Spine.  Patients states the pain has been present for 3 months.  The pain came on acutely.  The pain scaled level is 6.  The pain does radiate. Dermatomes are located on left Cervical at: a non-specific dermatome and to elbow.  The pain is constant and waxing/waning and described as dull.  The pain is worse at no particular time of day. Patient states  laying down makes the pain better.  Patient states Prolonged Standing and Prolonged Walking makes the pain worse.    Associated Symptoms Include: Numbness and Tingling to the left arm, left jaw. Denies weakness. No \"solid\" bowel movement recently..   Conservative Interventions Include:  None.    Was this the result of an injury or accident? : No    History of Previous Spinal Surgery?: Yes.  Lumbar, Date 2023.     reports that he has been smoking cigarettes. He has a 15 pack-year smoking history. He has never used smokeless tobacco.    Review of Systems   Musculoskeletal:  Positive for " "neck pain and neck stiffness.        Objective   Vital Signs:   /59 (BP Location: Left arm, Patient Position: Sitting, Cuff Size: Adult)   Pulse 74   Ht 162.6 cm (64\")   Wt 74.8 kg (165 lb)   BMI 28.32 kg/m²       Physical Exam  Constitutional:       Appearance: Normal appearance.   Neck:      Comments: Pain with ROM  Pulmonary:      Effort: Pulmonary effort is normal.   Musculoskeletal:         General: No tenderness.      Comments: Schaeffer's negative bilaterally,  Tinel's testing negative in the left wrist and elbow   Neurological:      General: No focal deficit present.      Mental Status: He is alert and oriented to person, place, and time.      Sensory: No sensory deficit.      Motor: Weakness (left hand ) present.      Deep Tendon Reflexes: Reflexes normal.   Psychiatric:         Mood and Affect: Mood normal.         Behavior: Behavior normal.        Neurologic Exam     Mental Status   Oriented to person, place, and time.        Result Review     I have personally reviewed the MRI of the cervical spine with and without contrast from 2/26/2024 which shows multilevel degenerative changes.  There is severe central canal stenosis at C3-C4, C4-C5 and C5-C6.  There is severe left foraminal stenosis at C3-C4, severe right foraminal stenosis at C4-C5 and C5-C6, with moderate to severe left foraminal stenosis at C5-C6.  There is severe right foraminal stenosis with moderate to severe left foraminal stenosis at C6-C7.    I reviewed the radiology report from MRI of the lumbar spine in 2023 which showed anterolisthesis of L4 on L5 and L5 on S1.     Assessment and Plan    Diagnoses and all orders for this visit:    1. Spinal stenosis in cervical region (Primary)    2. Herniated nucleus pulposus, C3-4    3. Herniated nucleus pulposus, C4-5    4. Herniated nucleus pulposus, C5-6    5. Foraminal stenosis of cervical region    6. Cervicalgia    7. Left arm pain    8. Numbness and tingling in left arm    He has " pain in the neck and the left arm to the elbow.    He has multilevel stenosis, severe from C3-C4 through C5-C6, less severe at C6-C7, with accompanying foraminal stenosis. We discussed that surgery may help arm pain, but would not help neck pain. I expect a multilevel ACDF. I expect he will need to quit nicotine first to pursue surgery. He verbalized understanding of that.    He may consider PT or JAMAAL conservatively.    He would like to discuss surgery with Dr. Staley, next available Thursday for follow-up to discuss.    Follow Up   Return in about 2 weeks (around 4/5/2024) for Next available Thursday to discuss surgery with Dr. Staley.  Patient was given instructions and counseling regarding his condition or for health maintenance advice. Please see specific information pulled into the AVS if appropriate.

## 2024-04-09 ENCOUNTER — OFFICE VISIT (OUTPATIENT)
Dept: NEUROSURGERY | Facility: CLINIC | Age: 74
End: 2024-04-09
Payer: MEDICARE

## 2024-04-09 VITALS
BODY MASS INDEX: 28.75 KG/M2 | SYSTOLIC BLOOD PRESSURE: 122 MMHG | WEIGHT: 168.4 LBS | HEIGHT: 64 IN | DIASTOLIC BLOOD PRESSURE: 74 MMHG

## 2024-04-09 DIAGNOSIS — M54.2 CERVICALGIA: Primary | ICD-10-CM

## 2024-04-09 DIAGNOSIS — M48.02 FORAMINAL STENOSIS OF CERVICAL REGION: ICD-10-CM

## 2024-04-09 DIAGNOSIS — M48.02 SPINAL STENOSIS IN CERVICAL REGION: ICD-10-CM

## 2024-04-09 PROCEDURE — 3078F DIAST BP <80 MM HG: CPT | Performed by: NEUROLOGICAL SURGERY

## 2024-04-09 PROCEDURE — 99213 OFFICE O/P EST LOW 20 MIN: CPT | Performed by: NEUROLOGICAL SURGERY

## 2024-04-09 PROCEDURE — 3074F SYST BP LT 130 MM HG: CPT | Performed by: NEUROLOGICAL SURGERY

## 2024-04-09 PROCEDURE — 1159F MED LIST DOCD IN RCRD: CPT | Performed by: NEUROLOGICAL SURGERY

## 2024-04-09 PROCEDURE — 1160F RVW MEDS BY RX/DR IN RCRD: CPT | Performed by: NEUROLOGICAL SURGERY

## 2024-04-09 NOTE — PROGRESS NOTES
Atilio Olson is a 73 y.o. male that presents with Neck Pain       He has neck greater than left arm pain to the elbow. He has some numbness on the left jaw to the elbow. He has no symptoms into the fingers. The neck pain is greater than the arm pain.             Review of Systems   Musculoskeletal:  Positive for back pain and neck pain.        Vitals:    04/09/24 0832   BP: 122/74        Physical Exam  Pulmonary:      Effort: Pulmonary effort is normal.   Neurological:      Mental Status: He is alert.   Psychiatric:         Mood and Affect: Mood normal.        I personally reviewed the patient's MRI scan which shows anterolisthesis of C3 on C4 and C4 on C5.     Assessment and Plan {CC Problem List  Visit Diagnosis  ROS  Review (Popup)  [employer] Maintenance  Quality  BestPractice  Medications  SmartSets  SnapShot Encounters  Media :23}   Problem List Items Addressed This Visit    None  Visit Diagnoses       Cervicalgia    -  Primary    Spinal stenosis in cervical region        Foraminal stenosis of cervical region            He will work on continued nicotine cessation. He could consider C3-4 and C4-5 ACDF, but this would help the arm more than the neck. He will monitor for worsened pain into the shoulder/arm. He could consider home traction (10-12 lbs) for 10-15 minutes.     Follow Up {Instructions Charge Capture  Follow-up Communications :23}   He will call once off nicotine and if the arm pain worsens.

## 2024-04-09 NOTE — PROGRESS NOTES
He has neck greater than left arm pain to the elbow. He has some numbness on the left jaw to the elbow. He has no symptoms into the fingers. The neck pain is greater than the arm pain.

## 2024-04-25 ENCOUNTER — TELEPHONE (OUTPATIENT)
Dept: UROLOGY | Facility: CLINIC | Age: 74
End: 2024-04-25

## 2024-04-25 ENCOUNTER — OFFICE VISIT (OUTPATIENT)
Dept: UROLOGY | Facility: CLINIC | Age: 74
End: 2024-04-25
Payer: MEDICARE

## 2024-04-25 VITALS
DIASTOLIC BLOOD PRESSURE: 68 MMHG | WEIGHT: 168 LBS | BODY MASS INDEX: 28.68 KG/M2 | SYSTOLIC BLOOD PRESSURE: 135 MMHG | HEIGHT: 64 IN

## 2024-04-25 DIAGNOSIS — R31.0 GROSS HEMATURIA: ICD-10-CM

## 2024-04-25 DIAGNOSIS — N40.1 BENIGN PROSTATIC HYPERPLASIA WITH WEAK URINARY STREAM: Primary | ICD-10-CM

## 2024-04-25 DIAGNOSIS — R39.12 BENIGN PROSTATIC HYPERPLASIA WITH WEAK URINARY STREAM: Primary | ICD-10-CM

## 2024-04-25 LAB — URINE VOLUME: 22

## 2024-04-25 PROCEDURE — 3075F SYST BP GE 130 - 139MM HG: CPT | Performed by: UROLOGY

## 2024-04-25 PROCEDURE — 1159F MED LIST DOCD IN RCRD: CPT | Performed by: UROLOGY

## 2024-04-25 PROCEDURE — 51798 US URINE CAPACITY MEASURE: CPT | Performed by: UROLOGY

## 2024-04-25 PROCEDURE — 3078F DIAST BP <80 MM HG: CPT | Performed by: UROLOGY

## 2024-04-25 PROCEDURE — 99212 OFFICE O/P EST SF 10 MIN: CPT | Performed by: UROLOGY

## 2024-04-25 PROCEDURE — 1160F RVW MEDS BY RX/DR IN RCRD: CPT | Performed by: UROLOGY

## 2024-04-25 NOTE — PROGRESS NOTES
"    UROLOGY OFFICE Follow Up NOTE    Subjective   HPI  Atilio Olson is a 73 y.o. male. who presents for a follow up visit. The patient continues to experience an extended period to urinate.  Complains of severe weak stream.  Despite the initiation of Cardura therapy, he has not observed any improvement in his urinary symptoms.  Very bothered by his urinary symptoms.    C/o right-sided pain, which extends towards the mid-abdomen; he suspects to be stool related. His bowel movements are regular.    He is on aspirin and Plavix since his last stroke in 2005.    _______  CT abdomen pelvis with contrast 2/15/2024 performed prior to this visit.  Stable left rigid lip adenoma; right renal cyst, enlarged prostate gland; no urinary bladder thickening.  Fat-containing left inguinal hernia.  Prostate gland measured and calculated out to 110 g approximately.    Office cystoscopy 2/27/2024: Severe prostatomegaly with bilateral coapting lateral lobes, some varicosities, elongated prostatic urethra; no median lobe or significant intravesical prostate tissue.  Cystoscopy revealed normal capacity bladder which was decompressed upon entry, one right and left ureteral orifice in the normal anatomic position, normal bladder mucosa and no tumors, masses or stones.       Results for orders placed or performed in visit on 04/25/24   Bladder Scan   Result Value Ref Range    Urine Volume 22          Review of systems  A review of systems was performed, and positive findings are noted in the HPI.    Objective     Vital Signs:   /68   Ht 162.6 cm (64\")   Wt 76.2 kg (168 lb)   BMI 28.84 kg/m²       Physical exam  No acute distress, well-nourished  Awake alert and oriented  Mood normal; affect normal    Bladder Scan interpretation 04/25/2024    Estimation of residual urine via BVI 3000 [place] Bladder Scan  Performed by: Mague Boo RN  Residual Urine: 22 ml  Indication: Benign prostatic hyperplasia with weak urinary stream    Gross " hematuria   Position: Supine  Examination: Incremental scanning of the suprapubic area using 2.0 MHz transducer using copious amounts of acoustic gel.   Findings: An anechoic area was demonstrated which represented the bladder, with measurement of residual urine as noted. I inspected this myself. In that the residual urine was stable or insignificant, refer to plan for treatment and plan necessary at this time.     Problem List:  Patient Active Problem List   Diagnosis    Primary osteoarthritis of right hip    History of right knee joint replacement    Esophageal reflux    Arthritis    Bladder disorder    Chest pain    Congestive heart failure    Diabetes    Disorder of bursae and tendons in shoulder region    High cholesterol    Hypertension    Limb swelling    Neurogenic bladder    Stroke       Assessment & Plan   Diagnoses and all orders for this visit:    1. Benign prostatic hyperplasia with weak urinary stream (Primary)  -     Bladder Scan    2. Gross hematuria      Hematuria workup completed, negative other than prostatomegaly    Suspect outlet obstruction    Believe the patient's slow urinary stream is a consequence of prostate enlargement, which is affecting the bladder. Additionally, the patient experiences groin pain.  Uncertain if groin pain is secondary to weak stream, some straining with urination.      A comprehensive discussion was held with the patient regarding various treatment options, including their respective risks and benefits. The patient expressed a preference for aquablation procedure.     Consequently, we will arrange for a uroflow and IPSS.    Follow-up after  All questions and concerns have been addressed at this time.        Transcribed from ambient dictation for Rupinder Hankins MD by Alia Elliott.  04/25/24   11:44 EDT    Patient or patient representative verbalized consent to the visit recording.  I have personally performed the services described in this document as transcribed  by the above individual, and it is both accurate and complete.

## 2024-04-25 NOTE — TELEPHONE ENCOUNTER
PATIENT'S WIFE CALLED.  HER  WAS SEEN TODAY AND WAS TOLD HE WOULD HAVE TO HAVE A TEST DONE BY DR. SHAH, BEFORE HE COULD GET SCHEDULED FOR AQUABLATION.    SHE SAID HE WAS TOLD HE WOULD GET CALLED TODAY WITH APPOINTMENT THERE, BUT HASN'T.    PATIENT CB#[phone] WIFE, DEE'S CB#[phone].

## 2024-04-26 DIAGNOSIS — N40.1 BENIGN PROSTATIC HYPERPLASIA WITH WEAK URINARY STREAM: ICD-10-CM

## 2024-04-26 DIAGNOSIS — R39.12 BENIGN PROSTATIC HYPERPLASIA WITH WEAK URINARY STREAM: ICD-10-CM

## 2024-04-26 NOTE — TELEPHONE ENCOUNTER
Informed pt wife that he is scheduled for 2:30pm Monday afternoon. Informed of address [address] [place] [address]  [address]

## 2024-04-29 ENCOUNTER — PROCEDURE VISIT (OUTPATIENT)
Dept: UROLOGY | Facility: CLINIC | Age: 74
End: 2024-04-29
Payer: MEDICARE

## 2024-04-29 DIAGNOSIS — N40.1 BENIGN PROSTATIC HYPERPLASIA WITH WEAK URINARY STREAM: Primary | ICD-10-CM

## 2024-04-29 DIAGNOSIS — R39.12 BENIGN PROSTATIC HYPERPLASIA WITH WEAK URINARY STREAM: Primary | ICD-10-CM

## 2024-04-29 PROCEDURE — 51741 ELECTRO-UROFLOWMETRY FIRST: CPT | Performed by: UROLOGY

## 2024-04-29 RX ORDER — DOXAZOSIN 2 MG/1
2 TABLET ORAL DAILY
Qty: 90 TABLET | Refills: 1 | Status: ON HOLD | OUTPATIENT
Start: 2024-04-29

## 2024-04-29 NOTE — PROGRESS NOTES
Uroflow.    Q-Gerry.  5.0 mL/s    Average flow.  2.6 mL/s    Voided volume.  146 mL    Interval.  7    Patient has obvious obstructive uroflow.  Further treatment and discussion is deferred to Dr. Rupinder Marks

## 2024-04-30 ENCOUNTER — PREP FOR SURGERY (OUTPATIENT)
Dept: OTHER | Facility: HOSPITAL | Age: 74
End: 2024-04-30
Payer: MEDICARE

## 2024-04-30 DIAGNOSIS — N40.1 BENIGN PROSTATIC HYPERPLASIA WITH WEAK URINARY STREAM: Primary | ICD-10-CM

## 2024-04-30 DIAGNOSIS — R39.12 BENIGN PROSTATIC HYPERPLASIA WITH WEAK URINARY STREAM: Primary | ICD-10-CM

## 2024-04-30 PROBLEM — N31.9 NEUROGENIC BLADDER: Status: RESOLVED | Noted: 2017-01-18 | Resolved: 2024-04-30

## 2024-04-30 RX ORDER — CEFAZOLIN SODIUM 2 G/100ML
2000 INJECTION, SOLUTION INTRAVENOUS ONCE
Status: CANCELLED | OUTPATIENT
Start: 2024-04-30 | End: 2024-04-30

## 2024-04-30 NOTE — PROGRESS NOTES
Uroflow performed, consistent with obstructive uropathy, flow rate max 5 mL/s.    IPSS performed reveals severe bother score of 29.    Previous discussion of option of managment via transurethral robotic water ablation of prostate with transrectal ultrasound guidance.   Had discussed that results after aquablation are not guaranteed and he could have persistence of symptoms, need for further management..  Also aware that the entire prostate is not removed thus there will be regrowth of the prostate over a period of time and could have recurrence of symptoms. Other risks including but not limited to bleeding, infection, pain, need for further procedures, risks of malignancy within the specimen, need for decompression of his bladder via catheter or self cath (unlikely in his case), worsening urinary symptoms, urgency, rare risk of sexual side effects with aquablation, urinary incontinence, or damage to surrounding structures.  Also discussed that this is a procedure performed under general anesthesia which has inherent risks including and up to death.    Patient was understanding of these risks and wishes to proceed      Workup complete; schedule OR   all questions were addressed after providing time for discussion.        Electronically signed by Rupinder Hankins MD, 04/30/24, 11:11 AM EDT.

## 2024-05-01 ENCOUNTER — HOSPITAL ENCOUNTER (OUTPATIENT)
Dept: GENERAL RADIOLOGY | Facility: HOSPITAL | Age: 74
Discharge: HOME | End: 2024-05-01
Payer: MEDICARE

## 2024-05-01 ENCOUNTER — PRE-ADMISSION TESTING (OUTPATIENT)
Dept: PREADMISSION TESTING | Facility: HOSPITAL | Age: 74
End: 2024-05-01
Payer: MEDICARE

## 2024-05-01 VITALS
DIASTOLIC BLOOD PRESSURE: 58 MMHG | BODY MASS INDEX: 28 KG/M2 | HEIGHT: 64 IN | OXYGEN SATURATION: 94 % | WEIGHT: 164.02 LBS | RESPIRATION RATE: 16 BRPM | TEMPERATURE: 98.2 F | HEART RATE: 86 BPM | SYSTOLIC BLOOD PRESSURE: 112 MMHG

## 2024-05-01 DIAGNOSIS — N40.1 BENIGN PROSTATIC HYPERPLASIA WITH WEAK URINARY STREAM: ICD-10-CM

## 2024-05-01 DIAGNOSIS — R39.12 BENIGN PROSTATIC HYPERPLASIA WITH WEAK URINARY STREAM: ICD-10-CM

## 2024-05-01 LAB
ABO GROUP BLD: NORMAL
ANION GAP SERPL CALCULATED.3IONS-SCNC: 10.5 MMOL/L (ref 5–15)
BACTERIA UR QL AUTO: ABNORMAL /HPF
BILIRUB UR QL STRIP: ABNORMAL
BUN SERPL-MCNC: 18 MG/DL (ref 8–23)
BUN/CREAT SERPL: 15.7 (ref 7–25)
CALCIUM SPEC-SCNC: 9.6 MG/DL (ref 8.6–10.5)
CHLORIDE SERPL-SCNC: 104 MMOL/L (ref 98–107)
CLARITY UR: ABNORMAL
CO2 SERPL-SCNC: 23.5 MMOL/L (ref 22–29)
COLOR UR: ABNORMAL
CREAT SERPL-MCNC: 1.15 MG/DL (ref 0.76–1.27)
DEPRECATED RDW RBC AUTO: 40.9 FL (ref 37–54)
EGFRCR SERPLBLD CKD-EPI 2021: 67.6 ML/MIN/1.73
ERYTHROCYTE [DISTWIDTH] IN BLOOD BY AUTOMATED COUNT: 13 % (ref 12.3–15.4)
GLUCOSE SERPL-MCNC: 160 MG/DL (ref 65–99)
GLUCOSE UR STRIP-MCNC: NEGATIVE MG/DL
HCT VFR BLD AUTO: 39.5 % (ref 37.5–51)
HGB BLD-MCNC: 13.3 G/DL (ref 13–17.7)
HGB UR QL STRIP.AUTO: NEGATIVE
HYALINE CASTS UR QL AUTO: ABNORMAL /LPF
KETONES UR QL STRIP: ABNORMAL
LEUKOCYTE ESTERASE UR QL STRIP.AUTO: ABNORMAL
MCH RBC QN AUTO: 29.1 PG (ref 26.6–33)
MCHC RBC AUTO-ENTMCNC: 33.7 G/DL (ref 31.5–35.7)
MCV RBC AUTO: 86.4 FL (ref 79–97)
NITRITE UR QL STRIP: NEGATIVE
PH UR STRIP.AUTO: 6 [PH] (ref 5–8)
PLATELET # BLD AUTO: 307 10*3/MM3 (ref 140–450)
PMV BLD AUTO: 9.3 FL (ref 6–12)
POTASSIUM SERPL-SCNC: 4.5 MMOL/L (ref 3.5–5.2)
PROT UR QL STRIP: ABNORMAL
QT INTERVAL: 405 MS
QTC INTERVAL: 413 MS
RBC # BLD AUTO: 4.57 10*6/MM3 (ref 4.14–5.8)
RBC # UR STRIP: ABNORMAL /HPF
REF LAB TEST METHOD: ABNORMAL
RH BLD: POSITIVE
SODIUM SERPL-SCNC: 138 MMOL/L (ref 136–145)
SP GR UR STRIP: 1.02 (ref 1–1.03)
SQUAMOUS #/AREA URNS HPF: ABNORMAL /HPF
UROBILINOGEN UR QL STRIP: ABNORMAL
WBC # UR STRIP: ABNORMAL /HPF
WBC NRBC COR # BLD AUTO: 7.86 10*3/MM3 (ref 3.4–10.8)

## 2024-05-01 PROCEDURE — 71046 X-RAY EXAM CHEST 2 VIEWS: CPT

## 2024-05-01 PROCEDURE — 36415 COLL VENOUS BLD VENIPUNCTURE: CPT

## 2024-05-01 PROCEDURE — 93005 ELECTROCARDIOGRAM TRACING: CPT

## 2024-05-01 PROCEDURE — 86900 BLOOD TYPING SEROLOGIC ABO: CPT

## 2024-05-01 PROCEDURE — 81001 URINALYSIS AUTO W/SCOPE: CPT

## 2024-05-01 PROCEDURE — 93010 ELECTROCARDIOGRAM REPORT: CPT | Performed by: SPECIALIST

## 2024-05-01 PROCEDURE — 86901 BLOOD TYPING SEROLOGIC RH(D): CPT

## 2024-05-01 PROCEDURE — 80048 BASIC METABOLIC PNL TOTAL CA: CPT

## 2024-05-01 PROCEDURE — 85027 COMPLETE CBC AUTOMATED: CPT

## 2024-05-01 NOTE — DISCHARGE INSTRUCTIONS
IMPORTANT INSTRUCTIONS - PRE-ADMISSION TESTING  DO NOT EAT OR CHEW anything after midnight the night before your procedure.    You may have CLEAR liquids up to __2____ hours prior to ARRIVAL time.   Take the following medications the morning of your procedure with JUST A SIP OF WATER:  BACLOFEN, PANTOPRAZOLE, LORATADINE, TRAMADOL AS NEEDED_  LAST DOSE FOLIC ACID 5/5/24____________________________________________________________________________________________________________________________________________________________________________LAST DOSE METFORMIN 5/5/24 PRIOR TO 6 P.M.  CONTINUE TO HOLD ASPIRIN, PLAVIX AND ALL VITAMINS AND SUPPLEMENTS__________    DO NOT BRING your medications to the hospital with you, UNLESS something has changed since your PRE-Admission Testing appointment.  Hold all vitamins, supplements, and NSAIDS (Non- steroidal anti-inflammatory meds) for one week prior to surgery (you MAY take Tylenol or Acetaminophen).  If you are diabetic, check your blood sugar the morning of your procedure. If it is less than 70 or if you are feeling symptomatic, call the following number for further instructions: [phone] SAME DAY SURGERY WILL ARRIVAL TIME FRIDAY 5/3/24 BY 4 P.M.______.  Use your inhalers/nebulizers as usual, the morning of your procedure. BRING YOUR INHALERS with you. NA  Bring your CPAP or BIPAP to hospital, ONLY IF YOU WILL BE SPENDING THE NIGHT. NA  Make sure you have a ride home and have someone who will stay with you the day of your procedure after you go home.  If you have any questions, please call your Pre-Admission Testing Nurse, _______CHUY_________ at [phone]_________.   Per anesthesia request, do not smoke for 24 hours before your procedure or as instructed by your surgeon. !!!!!!!!!!!!!!!      PREOPERATIVE (BEFORE SURGERY)              BATHING INSTRUCTIONS  Instructions:    You will need to shower 1 X utilizing the soap provided; at the times indicated   below:      - AM  THE DAY SURGERY OR PM  THE NIGHT BEFORE     Wash your hair and face with normal shampoo and soap, rinse it well before using the surgical soap.      In the shower, wet the skin completely with water from your neck to your feet. Apply the cleanser to your   body ONLY FROM THE NECK TO YOUR FEET.     Do NOT USE THE CLEANSER ON YOUR FACE, HEAD, OR GENITAL (PRIVATE) AREAS.   Keep it out of your eyes, ears, and mouth because of the risk of injury to those areas.      Scrub with a clean washcloth for each bath utilizing the soap provided from the top of your body to the   bottom starting at the neck area.      Pay close attention to your armpits, groin area, and the site of surgery.      Wash your body gently for 5 minutes. Stand outside the stream or turn off the water while scrubbing your   body. Do NOT wash with your regular soap after the surgical cleanser is used.      RINSE THE CLEANSER OFF COMPLETELY with plenty of water. Rinse the area again thoroughly.      Dry off with a clean towel. The surgical soap can cause dryness; however do NOT APPLY LOTION,   CREAM, POWDER, and/or DEODORANT AFTER SHOWERING.     Be sure to where clean clothes after showering.      Ensure CLEAN BED LINENS AFTER FIRST wash with the surgical soap.      NO PETS ALLOWED IN THE BED with you after utilizing the surgical soap.

## 2024-05-02 ENCOUNTER — ANESTHESIA EVENT (OUTPATIENT)
Dept: PERIOP | Facility: HOSPITAL | Age: 74
End: 2024-05-02
Payer: MEDICARE

## 2024-05-06 ENCOUNTER — HOSPITAL ENCOUNTER (OUTPATIENT)
Facility: HOSPITAL | Age: 74
Discharge: HOME | End: 2024-05-07
Attending: UROLOGY | Admitting: UROLOGY
Payer: MEDICARE

## 2024-05-06 ENCOUNTER — ANESTHESIA (OUTPATIENT)
Dept: PERIOP | Facility: HOSPITAL | Age: 74
End: 2024-05-06
Payer: MEDICARE

## 2024-05-06 DIAGNOSIS — N40.1 BENIGN PROSTATIC HYPERPLASIA WITH WEAK URINARY STREAM: ICD-10-CM

## 2024-05-06 DIAGNOSIS — R39.12 BENIGN PROSTATIC HYPERPLASIA WITH WEAK URINARY STREAM: ICD-10-CM

## 2024-05-06 PROBLEM — N40.0 BPH (BENIGN PROSTATIC HYPERPLASIA): Status: ACTIVE | Noted: 2024-05-06

## 2024-05-06 PROBLEM — N13.8 BPH WITH URINARY OBSTRUCTION: Status: ACTIVE | Noted: 2024-05-06

## 2024-05-06 LAB
ABO GROUP BLD: NORMAL
BLD GP AB SCN SERPL QL: NEGATIVE
GLUCOSE BLDC GLUCOMTR-MCNC: 146 MG/DL (ref 70–99)
GLUCOSE BLDC GLUCOMTR-MCNC: 154 MG/DL (ref 70–99)
GLUCOSE BLDC GLUCOMTR-MCNC: 253 MG/DL (ref 70–99)
GLUCOSE BLDC GLUCOMTR-MCNC: 256 MG/DL (ref 70–99)
RH BLD: POSITIVE
T&S EXPIRATION DATE: NORMAL

## 2024-05-06 PROCEDURE — 86900 BLOOD TYPING SEROLOGIC ABO: CPT | Performed by: UROLOGY

## 2024-05-06 PROCEDURE — 25810000003 LACTATED RINGERS PER 1000 ML: Performed by: ANESTHESIOLOGY

## 2024-05-06 PROCEDURE — A9270 NON-COVERED ITEM OR SERVICE: HCPCS | Performed by: UROLOGY

## 2024-05-06 PROCEDURE — 25010000002 CEFAZOLIN PER 500 MG: Performed by: UROLOGY

## 2024-05-06 PROCEDURE — 94799 UNLISTED PULMONARY SVC/PX: CPT

## 2024-05-06 PROCEDURE — 82948 REAGENT STRIP/BLOOD GLUCOSE: CPT

## 2024-05-06 PROCEDURE — A9270 NON-COVERED ITEM OR SERVICE: HCPCS

## 2024-05-06 PROCEDURE — 63710000001 ATORVASTATIN 10 MG TABLET: Performed by: UROLOGY

## 2024-05-06 PROCEDURE — 94761 N-INVAS EAR/PLS OXIMETRY MLT: CPT

## 2024-05-06 PROCEDURE — 63710000001 BACLOFEN 10 MG TABLET: Performed by: UROLOGY

## 2024-05-06 PROCEDURE — 25010000002 SUGAMMADEX 200 MG/2ML SOLUTION

## 2024-05-06 PROCEDURE — 82948 REAGENT STRIP/BLOOD GLUCOSE: CPT | Performed by: ANESTHESIOLOGY

## 2024-05-06 PROCEDURE — 63710000001 GABAPENTIN 400 MG CAPSULE: Performed by: UROLOGY

## 2024-05-06 PROCEDURE — 63710000001 FINASTERIDE 5 MG TABLET: Performed by: UROLOGY

## 2024-05-06 PROCEDURE — 25010000002 HYDROMORPHONE 1 MG/ML SOLUTION

## 2024-05-06 PROCEDURE — 25010000002 MIDAZOLAM PER 1MG: Performed by: ANESTHESIOLOGY

## 2024-05-06 PROCEDURE — S0260 H&P FOR SURGERY: HCPCS | Performed by: UROLOGY

## 2024-05-06 PROCEDURE — 63710000001 SENNOSIDES-DOCUSATE 8.6-50 MG TABLET: Performed by: UROLOGY

## 2024-05-06 PROCEDURE — 25010000002 DEXAMETHASONE PER 1 MG

## 2024-05-06 PROCEDURE — 86850 RBC ANTIBODY SCREEN: CPT | Performed by: UROLOGY

## 2024-05-06 PROCEDURE — 25010000002 ONDANSETRON PER 1 MG

## 2024-05-06 PROCEDURE — 63710000001 TEMAZEPAM 15 MG CAPSULE: Performed by: UROLOGY

## 2024-05-06 PROCEDURE — 0421T PR TRANSURETHRAL WATERJET ABLATION PROSTATE COMPL: CPT | Performed by: UROLOGY

## 2024-05-06 PROCEDURE — C2596 PROBE, ROBOTIC, WATER-JET: HCPCS | Performed by: UROLOGY

## 2024-05-06 PROCEDURE — 86901 BLOOD TYPING SEROLOGIC RH(D): CPT | Performed by: UROLOGY

## 2024-05-06 PROCEDURE — 63710000001 HYDROCHLOROTHIAZIDE 12.5 MG TABLET: Performed by: UROLOGY

## 2024-05-06 PROCEDURE — 25010000002 PROPOFOL 200 MG/20ML EMULSION

## 2024-05-06 PROCEDURE — G0378 HOSPITAL OBSERVATION PER HR: HCPCS

## 2024-05-06 PROCEDURE — 63710000001 OXYCODONE 5 MG TABLET

## 2024-05-06 PROCEDURE — 25010000002 FENTANYL CITRATE (PF) 50 MCG/ML SOLUTION

## 2024-05-06 PROCEDURE — 63710000001 INSULIN LISPRO (HUMAN) PER 5 UNITS: Performed by: UROLOGY

## 2024-05-06 PROCEDURE — 63710000001 OXYCODONE-ACETAMINOPHEN 5-325 MG TABLET: Performed by: UROLOGY

## 2024-05-06 PROCEDURE — 63710000001 LOSARTAN 50 MG TABLET: Performed by: UROLOGY

## 2024-05-06 PROCEDURE — 88305 TISSUE EXAM BY PATHOLOGIST: CPT | Performed by: UROLOGY

## 2024-05-06 PROCEDURE — 25810000003 LACTATED RINGERS PER 1000 ML: Performed by: UROLOGY

## 2024-05-06 RX ORDER — TRAMADOL HYDROCHLORIDE 50 MG/1
50 TABLET ORAL EVERY 8 HOURS PRN
Status: DISCONTINUED | OUTPATIENT
Start: 2024-05-06 | End: 2024-05-07 | Stop reason: HOSPADM

## 2024-05-06 RX ORDER — BACLOFEN 10 MG/1
10 TABLET ORAL 3 TIMES DAILY
Status: DISCONTINUED | OUTPATIENT
Start: 2024-05-06 | End: 2024-05-07 | Stop reason: HOSPADM

## 2024-05-06 RX ORDER — ONDANSETRON 4 MG/1
4 TABLET, ORALLY DISINTEGRATING ORAL EVERY 6 HOURS PRN
Status: DISCONTINUED | OUTPATIENT
Start: 2024-05-06 | End: 2024-05-07 | Stop reason: HOSPADM

## 2024-05-06 RX ORDER — SODIUM CHLORIDE, SODIUM LACTATE, POTASSIUM CHLORIDE, CALCIUM CHLORIDE 600; 310; 30; 20 MG/100ML; MG/100ML; MG/100ML; MG/100ML
9 INJECTION, SOLUTION INTRAVENOUS CONTINUOUS PRN
Status: DISCONTINUED | OUTPATIENT
Start: 2024-05-06 | End: 2024-05-07 | Stop reason: HOSPADM

## 2024-05-06 RX ORDER — DEXAMETHASONE SODIUM PHOSPHATE 4 MG/ML
INJECTION, SOLUTION INTRA-ARTICULAR; INTRALESIONAL; INTRAMUSCULAR; INTRAVENOUS; SOFT TISSUE AS NEEDED
Status: DISCONTINUED | OUTPATIENT
Start: 2024-05-06 | End: 2024-05-06 | Stop reason: SURG

## 2024-05-06 RX ORDER — OXYCODONE HYDROCHLORIDE 5 MG/1
5 TABLET ORAL
Status: COMPLETED | OUTPATIENT
Start: 2024-05-06 | End: 2024-05-06

## 2024-05-06 RX ORDER — PROPOFOL 10 MG/ML
INJECTION, EMULSION INTRAVENOUS AS NEEDED
Status: DISCONTINUED | OUTPATIENT
Start: 2024-05-06 | End: 2024-05-06 | Stop reason: SURG

## 2024-05-06 RX ORDER — ROCURONIUM BROMIDE 10 MG/ML
INJECTION, SOLUTION INTRAVENOUS AS NEEDED
Status: DISCONTINUED | OUTPATIENT
Start: 2024-05-06 | End: 2024-05-06 | Stop reason: SURG

## 2024-05-06 RX ORDER — SODIUM CHLORIDE 9 MG/ML
40 INJECTION, SOLUTION INTRAVENOUS AS NEEDED
Status: DISCONTINUED | OUTPATIENT
Start: 2024-05-06 | End: 2024-05-07 | Stop reason: HOSPADM

## 2024-05-06 RX ORDER — HYDROCHLOROTHIAZIDE 12.5 MG/1
12.5 TABLET ORAL DAILY
Status: DISCONTINUED | OUTPATIENT
Start: 2024-05-06 | End: 2024-05-07 | Stop reason: HOSPADM

## 2024-05-06 RX ORDER — LIDOCAINE HYDROCHLORIDE 20 MG/ML
INJECTION, SOLUTION EPIDURAL; INFILTRATION; INTRACAUDAL; PERINEURAL AS NEEDED
Status: DISCONTINUED | OUTPATIENT
Start: 2024-05-06 | End: 2024-05-06 | Stop reason: SURG

## 2024-05-06 RX ORDER — ATORVASTATIN CALCIUM 10 MG/1
10 TABLET, FILM COATED ORAL NIGHTLY
Status: DISCONTINUED | OUTPATIENT
Start: 2024-05-06 | End: 2024-05-07 | Stop reason: HOSPADM

## 2024-05-06 RX ORDER — SODIUM CHLORIDE 0.9 % (FLUSH) 0.9 %
10 SYRINGE (ML) INJECTION EVERY 12 HOURS SCHEDULED
Status: DISCONTINUED | OUTPATIENT
Start: 2024-05-06 | End: 2024-05-07 | Stop reason: HOSPADM

## 2024-05-06 RX ORDER — ONDANSETRON 2 MG/ML
4 INJECTION INTRAMUSCULAR; INTRAVENOUS ONCE AS NEEDED
Status: DISCONTINUED | OUTPATIENT
Start: 2024-05-06 | End: 2024-05-06

## 2024-05-06 RX ORDER — PROMETHAZINE HYDROCHLORIDE 25 MG/1
12.5 TABLET ORAL EVERY 6 HOURS PRN
Status: DISCONTINUED | OUTPATIENT
Start: 2024-05-06 | End: 2024-05-07 | Stop reason: HOSPADM

## 2024-05-06 RX ORDER — NICOTINE POLACRILEX 4 MG
15 LOZENGE BUCCAL
Status: DISCONTINUED | OUTPATIENT
Start: 2024-05-06 | End: 2024-05-07 | Stop reason: HOSPADM

## 2024-05-06 RX ORDER — ACETAMINOPHEN 650 MG/1
650 SUPPOSITORY RECTAL EVERY 4 HOURS PRN
Status: DISCONTINUED | OUTPATIENT
Start: 2024-05-06 | End: 2024-05-07 | Stop reason: HOSPADM

## 2024-05-06 RX ORDER — PROMETHAZINE HYDROCHLORIDE 25 MG/1
25 SUPPOSITORY RECTAL ONCE AS NEEDED
Status: DISCONTINUED | OUTPATIENT
Start: 2024-05-06 | End: 2024-05-06

## 2024-05-06 RX ORDER — OXYCODONE AND ACETAMINOPHEN 10; 325 MG/1; MG/1
1 TABLET ORAL EVERY 4 HOURS PRN
Status: DISCONTINUED | OUTPATIENT
Start: 2024-05-06 | End: 2024-05-07 | Stop reason: HOSPADM

## 2024-05-06 RX ORDER — PROMETHAZINE HYDROCHLORIDE 12.5 MG/1
25 TABLET ORAL ONCE AS NEEDED
Status: DISCONTINUED | OUTPATIENT
Start: 2024-05-06 | End: 2024-05-06

## 2024-05-06 RX ORDER — FINASTERIDE 5 MG/1
5 TABLET, FILM COATED ORAL DAILY
Status: DISCONTINUED | OUTPATIENT
Start: 2024-05-06 | End: 2024-05-07 | Stop reason: HOSPADM

## 2024-05-06 RX ORDER — CETIRIZINE HYDROCHLORIDE 10 MG/1
10 TABLET ORAL DAILY
Status: DISCONTINUED | OUTPATIENT
Start: 2024-05-06 | End: 2024-05-07 | Stop reason: HOSPADM

## 2024-05-06 RX ORDER — ONDANSETRON 2 MG/ML
4 INJECTION INTRAMUSCULAR; INTRAVENOUS EVERY 6 HOURS PRN
Status: DISCONTINUED | OUTPATIENT
Start: 2024-05-06 | End: 2024-05-07 | Stop reason: HOSPADM

## 2024-05-06 RX ORDER — GABAPENTIN 400 MG/1
800 CAPSULE ORAL EVERY 8 HOURS SCHEDULED
Status: DISCONTINUED | OUTPATIENT
Start: 2024-05-06 | End: 2024-05-07 | Stop reason: HOSPADM

## 2024-05-06 RX ORDER — NALOXONE HCL 0.4 MG/ML
0.4 VIAL (ML) INJECTION
Status: DISCONTINUED | OUTPATIENT
Start: 2024-05-06 | End: 2024-05-07 | Stop reason: HOSPADM

## 2024-05-06 RX ORDER — INSULIN LISPRO 100 [IU]/ML
2-9 INJECTION, SOLUTION INTRAVENOUS; SUBCUTANEOUS
Status: DISCONTINUED | OUTPATIENT
Start: 2024-05-06 | End: 2024-05-07 | Stop reason: HOSPADM

## 2024-05-06 RX ORDER — IBUPROFEN 600 MG/1
1 TABLET ORAL
Status: DISCONTINUED | OUTPATIENT
Start: 2024-05-06 | End: 2024-05-07 | Stop reason: HOSPADM

## 2024-05-06 RX ORDER — PANTOPRAZOLE SODIUM 20 MG/1
20 TABLET, DELAYED RELEASE ORAL DAILY
Status: DISCONTINUED | OUTPATIENT
Start: 2024-05-06 | End: 2024-05-07 | Stop reason: HOSPADM

## 2024-05-06 RX ORDER — BUPIVACAINE HCL/0.9 % NACL/PF 0.1 %
2000 PLASTIC BAG, INJECTION (ML) EPIDURAL ONCE
Status: COMPLETED | OUTPATIENT
Start: 2024-05-06 | End: 2024-05-06

## 2024-05-06 RX ORDER — LOSARTAN POTASSIUM 50 MG/1
100 TABLET ORAL
Status: DISCONTINUED | OUTPATIENT
Start: 2024-05-06 | End: 2024-05-07 | Stop reason: HOSPADM

## 2024-05-06 RX ORDER — OXYBUTYNIN CHLORIDE 5 MG/1
5 TABLET ORAL 2 TIMES DAILY PRN
Status: DISCONTINUED | OUTPATIENT
Start: 2024-05-06 | End: 2024-05-07 | Stop reason: HOSPADM

## 2024-05-06 RX ORDER — ACETAMINOPHEN 325 MG/1
650 TABLET ORAL EVERY 4 HOURS PRN
Status: DISCONTINUED | OUTPATIENT
Start: 2024-05-06 | End: 2024-05-07 | Stop reason: HOSPADM

## 2024-05-06 RX ORDER — SODIUM CHLORIDE, SODIUM LACTATE, POTASSIUM CHLORIDE, CALCIUM CHLORIDE 600; 310; 30; 20 MG/100ML; MG/100ML; MG/100ML; MG/100ML
100 INJECTION, SOLUTION INTRAVENOUS CONTINUOUS
Status: DISCONTINUED | OUTPATIENT
Start: 2024-05-06 | End: 2024-05-07

## 2024-05-06 RX ORDER — MAGNESIUM HYDROXIDE 1200 MG/15ML
LIQUID ORAL AS NEEDED
Status: DISCONTINUED | OUTPATIENT
Start: 2024-05-06 | End: 2024-05-06 | Stop reason: HOSPADM

## 2024-05-06 RX ORDER — AMOXICILLIN 250 MG
2 CAPSULE ORAL 2 TIMES DAILY
Status: DISCONTINUED | OUTPATIENT
Start: 2024-05-06 | End: 2024-05-07 | Stop reason: HOSPADM

## 2024-05-06 RX ORDER — MIDAZOLAM HYDROCHLORIDE 2 MG/2ML
2 INJECTION, SOLUTION INTRAMUSCULAR; INTRAVENOUS ONCE
Status: COMPLETED | OUTPATIENT
Start: 2024-05-06 | End: 2024-05-06

## 2024-05-06 RX ORDER — SODIUM CHLORIDE 0.9 % (FLUSH) 0.9 %
10 SYRINGE (ML) INJECTION AS NEEDED
Status: DISCONTINUED | OUTPATIENT
Start: 2024-05-06 | End: 2024-05-07 | Stop reason: HOSPADM

## 2024-05-06 RX ORDER — OXYCODONE HYDROCHLORIDE AND ACETAMINOPHEN 5; 325 MG/1; MG/1
2 TABLET ORAL EVERY 4 HOURS PRN
Status: DISCONTINUED | OUTPATIENT
Start: 2024-05-06 | End: 2024-05-07 | Stop reason: HOSPADM

## 2024-05-06 RX ORDER — MORPHINE SULFATE 2 MG/ML
2 INJECTION, SOLUTION INTRAMUSCULAR; INTRAVENOUS
Status: DISCONTINUED | OUTPATIENT
Start: 2024-05-06 | End: 2024-05-07 | Stop reason: HOSPADM

## 2024-05-06 RX ORDER — ONDANSETRON 2 MG/ML
INJECTION INTRAMUSCULAR; INTRAVENOUS AS NEEDED
Status: DISCONTINUED | OUTPATIENT
Start: 2024-05-06 | End: 2024-05-06 | Stop reason: SURG

## 2024-05-06 RX ORDER — FENTANYL CITRATE 50 UG/ML
INJECTION, SOLUTION INTRAMUSCULAR; INTRAVENOUS AS NEEDED
Status: DISCONTINUED | OUTPATIENT
Start: 2024-05-06 | End: 2024-05-06 | Stop reason: SURG

## 2024-05-06 RX ORDER — TEMAZEPAM 15 MG/1
15 CAPSULE ORAL NIGHTLY PRN
Status: DISCONTINUED | OUTPATIENT
Start: 2024-05-06 | End: 2024-05-07 | Stop reason: HOSPADM

## 2024-05-06 RX ORDER — DEXTROSE MONOHYDRATE 25 G/50ML
25 INJECTION, SOLUTION INTRAVENOUS
Status: DISCONTINUED | OUTPATIENT
Start: 2024-05-06 | End: 2024-05-07 | Stop reason: HOSPADM

## 2024-05-06 RX ORDER — ACETAMINOPHEN 500 MG
1000 TABLET ORAL ONCE
Status: COMPLETED | OUTPATIENT
Start: 2024-05-06 | End: 2024-05-06

## 2024-05-06 RX ADMIN — OXYCODONE 5 MG: 5 TABLET ORAL at 15:05

## 2024-05-06 RX ADMIN — TEMAZEPAM 15 MG: 15 CAPSULE ORAL at 22:05

## 2024-05-06 RX ADMIN — SODIUM CHLORIDE, POTASSIUM CHLORIDE, SODIUM LACTATE AND CALCIUM CHLORIDE: 600; 310; 30; 20 INJECTION, SOLUTION INTRAVENOUS at 14:04

## 2024-05-06 RX ADMIN — PROPOFOL 150 MG: 10 INJECTION, EMULSION INTRAVENOUS at 13:02

## 2024-05-06 RX ADMIN — SODIUM CHLORIDE, POTASSIUM CHLORIDE, SODIUM LACTATE AND CALCIUM CHLORIDE 9 ML/HR: 600; 310; 30; 20 INJECTION, SOLUTION INTRAVENOUS at 12:38

## 2024-05-06 RX ADMIN — ATORVASTATIN CALCIUM 10 MG: 10 TABLET, FILM COATED ORAL at 19:58

## 2024-05-06 RX ADMIN — LIDOCAINE HYDROCHLORIDE 80 MG: 20 INJECTION, SOLUTION EPIDURAL; INFILTRATION; INTRACAUDAL; PERINEURAL at 13:02

## 2024-05-06 RX ADMIN — DOCUSATE SODIUM 50MG AND SENNOSIDES 8.6MG 2 TABLET: 8.6; 5 TABLET, FILM COATED ORAL at 19:58

## 2024-05-06 RX ADMIN — INSULIN LISPRO 6 UNITS: 100 INJECTION, SOLUTION INTRAVENOUS; SUBCUTANEOUS at 21:09

## 2024-05-06 RX ADMIN — FENTANYL CITRATE 50 MCG: 50 INJECTION, SOLUTION INTRAMUSCULAR; INTRAVENOUS at 13:43

## 2024-05-06 RX ADMIN — HYDROMORPHONE HYDROCHLORIDE 0.5 MG: 1 INJECTION, SOLUTION INTRAMUSCULAR; INTRAVENOUS; SUBCUTANEOUS at 14:25

## 2024-05-06 RX ADMIN — FINASTERIDE 5 MG: 5 TABLET, FILM COATED ORAL at 16:49

## 2024-05-06 RX ADMIN — SODIUM CHLORIDE, POTASSIUM CHLORIDE, SODIUM LACTATE AND CALCIUM CHLORIDE 100 ML/HR: 600; 310; 30; 20 INJECTION, SOLUTION INTRAVENOUS at 16:49

## 2024-05-06 RX ADMIN — HYDROMORPHONE HYDROCHLORIDE 0.5 MG: 1 INJECTION, SOLUTION INTRAMUSCULAR; INTRAVENOUS; SUBCUTANEOUS at 14:10

## 2024-05-06 RX ADMIN — ONDANSETRON 4 MG: 2 INJECTION INTRAMUSCULAR; INTRAVENOUS at 13:09

## 2024-05-06 RX ADMIN — BACLOFEN 10 MG: 10 TABLET ORAL at 20:54

## 2024-05-06 RX ADMIN — DEXAMETHASONE SODIUM PHOSPHATE 4 MG: 4 INJECTION, SOLUTION INTRAMUSCULAR; INTRAVENOUS at 13:09

## 2024-05-06 RX ADMIN — HYDROCHLOROTHIAZIDE 12.5 MG: 12.5 TABLET ORAL at 16:49

## 2024-05-06 RX ADMIN — HYDROMORPHONE HYDROCHLORIDE 0.5 MG: 1 INJECTION, SOLUTION INTRAMUSCULAR; INTRAVENOUS; SUBCUTANEOUS at 14:37

## 2024-05-06 RX ADMIN — FENTANYL CITRATE 50 MCG: 50 INJECTION, SOLUTION INTRAMUSCULAR; INTRAVENOUS at 13:02

## 2024-05-06 RX ADMIN — Medication 2000 MG: at 13:05

## 2024-05-06 RX ADMIN — ROCURONIUM BROMIDE 70 MG: 10 INJECTION, SOLUTION INTRAVENOUS at 13:02

## 2024-05-06 RX ADMIN — OXYCODONE HYDROCHLORIDE AND ACETAMINOPHEN 2 TABLET: 5; 325 TABLET ORAL at 20:57

## 2024-05-06 RX ADMIN — BACLOFEN 10 MG: 10 TABLET ORAL at 16:49

## 2024-05-06 RX ADMIN — OXYCODONE 5 MG: 5 TABLET ORAL at 14:38

## 2024-05-06 RX ADMIN — HYDROMORPHONE HYDROCHLORIDE 0.25 MG: 1 INJECTION, SOLUTION INTRAMUSCULAR; INTRAVENOUS; SUBCUTANEOUS at 15:04

## 2024-05-06 RX ADMIN — SUGAMMADEX 200 MG: 100 INJECTION, SOLUTION INTRAVENOUS at 14:04

## 2024-05-06 RX ADMIN — ACETAMINOPHEN 1000 MG: 500 TABLET ORAL at 12:38

## 2024-05-06 RX ADMIN — GABAPENTIN 800 MG: 400 CAPSULE ORAL at 19:58

## 2024-05-06 RX ADMIN — LOSARTAN POTASSIUM 100 MG: 50 TABLET, FILM COATED ORAL at 16:49

## 2024-05-06 RX ADMIN — MIDAZOLAM HYDROCHLORIDE 2 MG: 1 INJECTION, SOLUTION INTRAMUSCULAR; INTRAVENOUS at 12:52

## 2024-05-06 RX ADMIN — HYDROMORPHONE HYDROCHLORIDE 0.25 MG: 1 INJECTION, SOLUTION INTRAMUSCULAR; INTRAVENOUS; SUBCUTANEOUS at 16:04

## 2024-05-07 ENCOUNTER — TELEPHONE (OUTPATIENT)
Dept: UROLOGY | Facility: CLINIC | Age: 74
End: 2024-05-07
Payer: MEDICARE

## 2024-05-07 VITALS
TEMPERATURE: 97.7 F | HEART RATE: 63 BPM | WEIGHT: 162.7 LBS | OXYGEN SATURATION: 91 % | DIASTOLIC BLOOD PRESSURE: 63 MMHG | SYSTOLIC BLOOD PRESSURE: 106 MMHG | HEIGHT: 61 IN | BODY MASS INDEX: 30.72 KG/M2 | RESPIRATION RATE: 20 BRPM

## 2024-05-07 LAB
ANION GAP SERPL CALCULATED.3IONS-SCNC: 7.4 MMOL/L (ref 5–15)
BUN SERPL-MCNC: 18 MG/DL (ref 8–23)
BUN/CREAT SERPL: 12.2 (ref 7–25)
CALCIUM SPEC-SCNC: 9.1 MG/DL (ref 8.6–10.5)
CHLORIDE SERPL-SCNC: 107 MMOL/L (ref 98–107)
CO2 SERPL-SCNC: 25.6 MMOL/L (ref 22–29)
CREAT SERPL-MCNC: 1.47 MG/DL (ref 0.76–1.27)
DEPRECATED RDW RBC AUTO: 42.7 FL (ref 37–54)
EGFRCR SERPLBLD CKD-EPI 2021: 50.4 ML/MIN/1.73
ERYTHROCYTE [DISTWIDTH] IN BLOOD BY AUTOMATED COUNT: 13.2 % (ref 12.3–15.4)
GLUCOSE BLDC GLUCOMTR-MCNC: 178 MG/DL (ref 70–99)
GLUCOSE SERPL-MCNC: 140 MG/DL (ref 65–99)
HCT VFR BLD AUTO: 35.4 % (ref 37.5–51)
HGB BLD-MCNC: 11.6 G/DL (ref 13–17.7)
MCH RBC QN AUTO: 29 PG (ref 26.6–33)
MCHC RBC AUTO-ENTMCNC: 32.8 G/DL (ref 31.5–35.7)
MCV RBC AUTO: 88.5 FL (ref 79–97)
PLATELET # BLD AUTO: 273 10*3/MM3 (ref 140–450)
PMV BLD AUTO: 9.7 FL (ref 6–12)
POTASSIUM SERPL-SCNC: 4.3 MMOL/L (ref 3.5–5.2)
RBC # BLD AUTO: 4 10*6/MM3 (ref 4.14–5.8)
SODIUM SERPL-SCNC: 140 MMOL/L (ref 136–145)
WBC NRBC COR # BLD AUTO: 13.17 10*3/MM3 (ref 3.4–10.8)

## 2024-05-07 PROCEDURE — A9270 NON-COVERED ITEM OR SERVICE: HCPCS | Performed by: UROLOGY

## 2024-05-07 PROCEDURE — 63710000001 HYDROCHLOROTHIAZIDE 12.5 MG TABLET: Performed by: UROLOGY

## 2024-05-07 PROCEDURE — 25810000003 LACTATED RINGERS PER 1000 ML: Performed by: UROLOGY

## 2024-05-07 PROCEDURE — 63710000001 FINASTERIDE 5 MG TABLET: Performed by: UROLOGY

## 2024-05-07 PROCEDURE — 63710000001 OXYCODONE-ACETAMINOPHEN 5-325 MG TABLET: Performed by: UROLOGY

## 2024-05-07 PROCEDURE — 63710000001 BISACODYL 5 MG TABLET DELAYED-RELEASE: Performed by: UROLOGY

## 2024-05-07 PROCEDURE — 63710000001 SENNOSIDES-DOCUSATE 8.6-50 MG TABLET: Performed by: UROLOGY

## 2024-05-07 PROCEDURE — 63710000001 BACLOFEN 10 MG TABLET: Performed by: UROLOGY

## 2024-05-07 PROCEDURE — 85027 COMPLETE CBC AUTOMATED: CPT | Performed by: UROLOGY

## 2024-05-07 PROCEDURE — 99238 HOSP IP/OBS DSCHRG MGMT 30/<: CPT | Performed by: UROLOGY

## 2024-05-07 PROCEDURE — 63710000001 PANTOPRAZOLE 20 MG TABLET DELAYED-RELEASE: Performed by: UROLOGY

## 2024-05-07 PROCEDURE — 63710000001 CETIRIZINE 10 MG TABLET: Performed by: UROLOGY

## 2024-05-07 PROCEDURE — 80048 BASIC METABOLIC PNL TOTAL CA: CPT | Performed by: UROLOGY

## 2024-05-07 PROCEDURE — 82948 REAGENT STRIP/BLOOD GLUCOSE: CPT

## 2024-05-07 PROCEDURE — 63710000001 LOSARTAN 50 MG TABLET: Performed by: UROLOGY

## 2024-05-07 PROCEDURE — 63710000001 OXYCODONE-ACETAMINOPHEN 10-325 MG TABLET: Performed by: UROLOGY

## 2024-05-07 PROCEDURE — 63710000001 GABAPENTIN 400 MG CAPSULE: Performed by: UROLOGY

## 2024-05-07 RX ORDER — CLOPIDOGREL BISULFATE 75 MG/1
75 TABLET ORAL DAILY
Start: 2024-05-14

## 2024-05-07 RX ORDER — OXYCODONE HYDROCHLORIDE AND ACETAMINOPHEN 5; 325 MG/1; MG/1
1-2 TABLET ORAL EVERY 6 HOURS PRN
Qty: 10 TABLET | Refills: 0 | Status: SHIPPED | OUTPATIENT
Start: 2024-05-07 | End: 2024-05-13

## 2024-05-07 RX ORDER — BISACODYL 5 MG/1
10 TABLET, DELAYED RELEASE ORAL DAILY PRN
Status: DISCONTINUED | OUTPATIENT
Start: 2024-05-07 | End: 2024-05-07 | Stop reason: HOSPADM

## 2024-05-07 RX ORDER — ASPIRIN 81 MG/1
81 TABLET ORAL DAILY
Start: 2024-05-10

## 2024-05-07 RX ORDER — OXYBUTYNIN CHLORIDE 5 MG/1
5 TABLET ORAL 3 TIMES DAILY PRN
Qty: 10 TABLET | Refills: 0 | Status: SHIPPED | OUTPATIENT
Start: 2024-05-07

## 2024-05-07 RX ORDER — AMOXICILLIN 250 MG
1 CAPSULE ORAL 2 TIMES DAILY
Qty: 20 TABLET | Refills: 0 | Status: SHIPPED | OUTPATIENT
Start: 2024-05-07

## 2024-05-07 RX ADMIN — OXYCODONE HYDROCHLORIDE AND ACETAMINOPHEN 2 TABLET: 5; 325 TABLET ORAL at 09:19

## 2024-05-07 RX ADMIN — PANTOPRAZOLE 20 MG: 20 TABLET, DELAYED RELEASE ORAL at 09:15

## 2024-05-07 RX ADMIN — FINASTERIDE 5 MG: 5 TABLET, FILM COATED ORAL at 09:15

## 2024-05-07 RX ADMIN — CETIRIZINE HYDROCHLORIDE 10 MG: 10 TABLET, FILM COATED ORAL at 09:15

## 2024-05-07 RX ADMIN — LOSARTAN POTASSIUM 100 MG: 50 TABLET, FILM COATED ORAL at 09:15

## 2024-05-07 RX ADMIN — BACLOFEN 10 MG: 10 TABLET ORAL at 09:15

## 2024-05-07 RX ADMIN — HYDROCHLOROTHIAZIDE 12.5 MG: 12.5 TABLET ORAL at 09:15

## 2024-05-07 RX ADMIN — GABAPENTIN 800 MG: 400 CAPSULE ORAL at 05:27

## 2024-05-07 RX ADMIN — OXYCODONE HYDROCHLORIDE AND ACETAMINOPHEN 1 TABLET: 10; 325 TABLET ORAL at 01:15

## 2024-05-07 RX ADMIN — SODIUM CHLORIDE, POTASSIUM CHLORIDE, SODIUM LACTATE AND CALCIUM CHLORIDE 100 ML/HR: 600; 310; 30; 20 INJECTION, SOLUTION INTRAVENOUS at 00:32

## 2024-05-07 RX ADMIN — OXYCODONE HYDROCHLORIDE AND ACETAMINOPHEN 2 TABLET: 5; 325 TABLET ORAL at 05:27

## 2024-05-07 RX ADMIN — BISACODYL 10 MG: 5 TABLET, COATED ORAL at 09:15

## 2024-05-07 RX ADMIN — DOCUSATE SODIUM 50MG AND SENNOSIDES 8.6MG 2 TABLET: 8.6; 5 TABLET, FILM COATED ORAL at 09:15

## 2024-05-08 LAB
CYTO UR: NORMAL
LAB AP CASE REPORT: NORMAL
LAB AP CLINICAL INFORMATION: NORMAL
PATH REPORT.FINAL DX SPEC: NORMAL
PATH REPORT.GROSS SPEC: NORMAL

## 2024-05-10 ENCOUNTER — CLINICAL SUPPORT (OUTPATIENT)
Dept: UROLOGY | Facility: CLINIC | Age: 74
End: 2024-05-10
Payer: MEDICARE

## 2024-05-10 VITALS — BODY MASS INDEX: 30.58 KG/M2 | HEIGHT: 61 IN | RESPIRATION RATE: 16 BRPM | WEIGHT: 162 LBS

## 2024-05-10 DIAGNOSIS — R39.12 BENIGN PROSTATIC HYPERPLASIA WITH WEAK URINARY STREAM: Primary | ICD-10-CM

## 2024-05-10 DIAGNOSIS — N40.1 BENIGN PROSTATIC HYPERPLASIA WITH WEAK URINARY STREAM: Primary | ICD-10-CM

## 2024-05-21 DIAGNOSIS — R39.12 BENIGN PROSTATIC HYPERPLASIA WITH WEAK URINARY STREAM: ICD-10-CM

## 2024-05-21 DIAGNOSIS — N40.1 BENIGN PROSTATIC HYPERPLASIA WITH WEAK URINARY STREAM: ICD-10-CM

## 2024-05-21 RX ORDER — DOXAZOSIN 2 MG/1
2 TABLET ORAL DAILY
Qty: 30 TABLET | Refills: 0 | Status: SHIPPED | OUTPATIENT
Start: 2024-05-21

## 2024-06-05 ENCOUNTER — OFFICE VISIT (OUTPATIENT)
Dept: UROLOGY | Facility: CLINIC | Age: 74
End: 2024-06-05
Payer: MEDICARE

## 2024-06-05 VITALS
WEIGHT: 164 LBS | BODY MASS INDEX: 30.96 KG/M2 | HEIGHT: 61 IN | SYSTOLIC BLOOD PRESSURE: 131 MMHG | DIASTOLIC BLOOD PRESSURE: 68 MMHG | TEMPERATURE: 97.4 F

## 2024-06-05 DIAGNOSIS — N40.1 BENIGN PROSTATIC HYPERPLASIA WITH WEAK URINARY STREAM: Primary | ICD-10-CM

## 2024-06-05 DIAGNOSIS — R39.12 BENIGN PROSTATIC HYPERPLASIA WITH WEAK URINARY STREAM: Primary | ICD-10-CM

## 2024-06-05 LAB — URINE VOLUME: 97

## 2024-06-05 NOTE — PROGRESS NOTES
"    UROLOGY OFFICE FOLLOW UP NOTE    Subjective   HPI  Atilio Olson is a 73 y.o. male.  Presents for follow-up BPH with outlet obstruction treated with cystoscopy and aqua ablation, 5/6/2024.    History of Present Illness  The patient reports an improvement in his condition following an aqua ablation procedure. He experiences increased frequency of urination, characterized by urgency. His urinary stream is strong, and he denies any hematuria. He experiences occasional dysuria, which is improving. Prior to aqua ablation, it took an extended period to fully empty his bladder, but this has since improved.    The patient continues to experience lower abdominal pain, which has now migrated to the mid-abdominal region.  Has pending referral to Dr. Wiggins, general surgery.    _________  Prostate pathology, aqua ablation 5/6/2024: Benign prostatic hyperplasia    Results for orders placed or performed in visit on 06/05/24   Bladder Scan   Result Value Ref Range    Urine Volume 97          Review of systems  A review of systems was performed, and positive findings are noted in the HPI.    Objective     Vital Signs:   /68   Temp 97.4 °F (36.3 °C)   Ht 154.9 cm (61\")   Wt 74.4 kg (164 lb)   BMI 30.99 kg/m²       Physical exam  No acute distress, well-nourished  Awake alert and oriented  Mood normal; affect normal  Physical Exam        Bladder Scan interpretation 06/05/2024    Estimation of residual urine via BVI 3000 Verathon Bladder Scan  Performed by: Mague Boo RN  Residual Urine: 97 ml  Indication: Benign prostatic hyperplasia with weak urinary stream   Position: Supine  Examination: Incremental scanning of the suprapubic area using 2.0 MHz transducer using copious amounts of acoustic gel.   Findings: An anechoic area was demonstrated which represented the bladder, with measurement of residual urine as noted. I inspected this myself. In that the residual urine was stable or insignificant, refer to plan for " treatment and plan necessary at this time.     Problem List:  Patient Active Problem List   Diagnosis    Primary osteoarthritis of right hip    History of right knee joint replacement    Esophageal reflux    Arthritis    Bladder disorder    Chest pain    Congestive heart failure    Diabetes    Disorder of bursae and tendons in shoulder region    High cholesterol    Hypertension    Limb swelling    Stroke    Benign prostatic hyperplasia with weak urinary stream    BPH with urinary obstruction    BPH (benign prostatic hyperplasia)       Assessment & Plan   Diagnoses and all orders for this visit:    1. Benign prostatic hyperplasia with weak urinary stream (Primary)  -     Bladder Scan      Assessment & Plan  Doing well after aqua ablation; adequately emptying bladder  Provided reassurance, urgency frequency, and after outlet procedure; anticipate improvement slowly with time  Consider further management if needed at follow-up    Lower abdominal discomfort, right abdominal bulge; pending referral to general surgery, Dr. Wiggins; no evidence of hernia per CT scan report reviewed with patient today, attempted to provide reassurance from urology standpoint.    Prostate pathology, aqua ablation benign      Follow-up  A follow-up appointment is scheduled for 3 months from now, PVR     All questions addressed      Patient or patient representative verbalized consent for the use of Ambient Listening during the visit with  Rupinder Hankins MD for chart documentation. 6/5/2024  09:13 EDT

## 2024-06-07 ENCOUNTER — OFFICE VISIT (OUTPATIENT)
Dept: SURGERY | Facility: CLINIC | Age: 74
End: 2024-06-07
Payer: MEDICARE

## 2024-06-07 VITALS — HEIGHT: 61 IN | RESPIRATION RATE: 16 BRPM | WEIGHT: 161 LBS | BODY MASS INDEX: 30.4 KG/M2

## 2024-06-07 DIAGNOSIS — R10.31 RIGHT LOWER QUADRANT ABDOMINAL PAIN: Primary | ICD-10-CM

## 2024-06-07 NOTE — PROGRESS NOTES
Inpatient History and Physical Surgical Orders    Preadmission Location:   Preadmission Time:  Facility:  Surgery Date:  Surgery Time:  Preadmission Test date:     Chief Complaint  Outpatient History and Physical / Surgical Orders    Primary Care Provider: Shay Vora MD    Referring Provider: Shay Vora MD    Subjective      Patient Name: Atilio Olson : 1950    HPI  The patient is a 73-year-old gentleman that we have taken care of in the past.  He has been having some right-sided abdominal pain and had a CT scan from earlier this year that showed no real acute problems.  He has been hurting for several months but has been eating okay and going to the bathroom fine.    Past History:  Medical History: has a past medical history of Abnormal ECG ([id number]), Acid reflux, Arthritis, Arthritis of back, Bladder disorder, BPH with urinary obstruction (2024), Cervical disc disorder, Claustrophobia, Congestive heart failure (CHF), Deep vein thrombosis (), Diabetes, Fracture, foot, High cholesterol, Hypertension, Limb swelling, Low back pain, Lumbosacral disc disease, Neurogenic bladder (2017), NSTEMI (non-ST elevated myocardial infarction) (2024), Periarthritis of shoulder, Peripheral neuropathy, Spinal stenosis, Stroke, Tear of meniscus of knee, and Tendonitis of shoulder (2018).   Surgical History: has a past surgical history that includes Other surgical history; Other surgical history; Colonoscopy; Excision; Gallbladder surgery; Hernia repair; Other surgical history; Replacement total knee (Left); Replacement total knee (Right); Tonsillectomy; Wrist surgery; Lumbar discectomy (Left, 2023); Hand surgery; Shoulder surgery (Right); Back surgery; Joint replacement; Foot surgery (Left); Cardiac catheterization (N/A, 2024); Cystoscopy; and PROSTATE AQUABLATION (N/A, 2024).   Family History: family history includes Arthritis in his father and mother; Diabetes  in his father, mother, sister, and son; Heart disease in his father and mother; Stroke in his father and sister.   Social History: reports that he has been smoking cigarettes. He has a 15 pack-year smoking history. He has never used smokeless tobacco. He reports that he does not currently use alcohol. He reports that he does not use drugs.  Allergies: Patient has no known allergies.       Current Outpatient Medications:     aspirin 81 MG EC tablet, Take 1 tablet by mouth Daily. If urine clearing and not passing excessive clot, Disp: , Rfl:     atorvastatin (LIPITOR) 10 MG tablet, Take 1 tablet by mouth Every Night., Disp: , Rfl:     B Complex-C-Folic Acid (FOLBEE PLUS PO), Take 1 tablet by mouth Daily. LAST DOSE 5/5/24, Disp: , Rfl:     baclofen (LIORESAL) 10 MG tablet, Take 1 tablet by mouth 3 (Three) Times a Day., Disp: , Rfl:     clopidogrel (PLAVIX) 75 MG tablet, Take 1 tablet by mouth Daily. If urine clearing and not passing excessive clot  Indications: Resume on 3/24/23, Disp: , Rfl:     doxazosin (CARDURA) 2 MG tablet, TAKE 1 TABLET BY MOUTH DAILY, Disp: 30 tablet, Rfl: 0    gabapentin (NEURONTIN) 800 MG tablet, Take 1 tablet by mouth 3 (Three) Times a Day., Disp: , Rfl:     Loratadine 10 MG capsule, Take 1 capsule by mouth Daily., Disp: , Rfl:     losartan-HCTZ (HYZAAR) 100-12.5 combo dose, Take 1 dose by mouth Daily. LAST DOSE 5/5/24, Disp: , Rfl:     metFORMIN (GLUCOPHAGE) 500 MG tablet, Take 1 tablet by mouth 2 (Two) Times a Day With Meals. LAST DOSE 5/5/24 PRIOR TO 6 PM, Disp: , Rfl:     multivitamin with minerals tablet tablet, Take 1 tablet by mouth Daily. LD 5/1/24, Disp: , Rfl:     Omega-3 Fatty Acids (fish oil) 1000 MG capsule capsule, Take 1 capsule by mouth Daily With Breakfast. LD 5/5/24, Disp: , Rfl:     oxybutynin (DITROPAN) 5 MG tablet, Take 1 tablet by mouth 3 (Three) Times a Day As Needed (Bladder spasms, bladder cramping, leakage around catheter). May cause constipation, Disp: 10 tablet,  "Rfl: 0    pantoprazole (PROTONIX) 20 MG EC tablet, Take 1 tablet by mouth Daily., Disp: , Rfl:     sennosides-docusate (PERICOLACE) 8.6-50 MG per tablet, Take 1 tablet by mouth 2 (Two) Times a Day. May take as needed once having regular bowel movements, Disp: 20 tablet, Rfl: 0    temazepam (RESTORIL) 15 MG capsule, 1 capsule At Night As Needed for Sleep or Anxiety., Disp: , Rfl:     traMADol (ULTRAM) 50 MG tablet, Take 1 tablet by mouth Every 8 (Eight) Hours As Needed for Moderate Pain., Disp: , Rfl:        Objective   Vital Signs:   Resp 16   Ht 154.9 cm (60.98\")   Wt 73 kg (161 lb)   BMI 30.44 kg/m²       Physical Exam  Vitals and nursing note reviewed.   Constitutional:       Appearance: Normal appearance. The patient is well-developed.   Cardiovascular:      Rate and Rhythm: Normal rate and regular rhythm.   Pulmonary:      Effort: Pulmonary effort is normal.      Breath sounds: Normal air entry.   Abdominal:      General: Bowel sounds are normal.      Palpations: Abdomen is soft.  He does have a subtle bulge in his right flank that I think is more consistent with a diastases rather than a hernia.     Skin:     General: Skin is warm and dry.   Neurological:      Mental Status: The patient is alert and oriented to person, place, and time.      Motor: Motor function is intact.   Psychiatric:         Mood and Affect: Mood normal.       Result Review :               Assessment and Plan   Diagnoses and all orders for this visit:    1. Right lower quadrant abdominal pain (Primary)    I reviewed his CT scan today and I think the abdominal wall musculature on the right side of his abdomen does look a little bit more diastatic than on the left side.  I do not see any obvious hernias there.  He does appear to have a small inguinal hernia but at this point he is asymptomatic from it.  I reassured him that I think we could just follow this expectantly.  I have asked him to call me if he were to have any further " concerns.    I  Wilder Wiggins MD  06/07/2024

## 2024-06-25 DIAGNOSIS — N40.1 BENIGN PROSTATIC HYPERPLASIA WITH WEAK URINARY STREAM: ICD-10-CM

## 2024-06-25 DIAGNOSIS — R39.12 BENIGN PROSTATIC HYPERPLASIA WITH WEAK URINARY STREAM: ICD-10-CM

## 2024-06-26 RX ORDER — DOXAZOSIN 2 MG/1
2 TABLET ORAL DAILY
Qty: 90 TABLET | Refills: 3 | Status: SHIPPED | OUTPATIENT
Start: 2024-06-26

## 2024-07-03 ENCOUNTER — TELEPHONE (OUTPATIENT)
Dept: SURGERY | Facility: CLINIC | Age: 74
End: 2024-07-03
Payer: MEDICARE

## 2024-07-03 NOTE — TELEPHONE ENCOUNTER
PT CALLED AND STATED HIS STREAM IS ABOUT THE SIZE OF A PENCIL LED AND IT IS A 6 OR 7 ON THE PAIN SCALE. HE STATED IT HAS BEEN GOING ON FOR ABOUT THE LAST TWO WEEKS. IT SEEMS LIKE PAIN WISE IT SEEMS TO BE GETTING WORSE. HE SAID IT TAKES A LONG TIME TO EMPTY HIS BLADDER. [phone]

## 2024-07-03 NOTE — TELEPHONE ENCOUNTER
Yes unfortunately will warrant ER; given holiday weekend, no option of coming to office at this point

## 2024-07-03 NOTE — TELEPHONE ENCOUNTER
Informed pt to go to the hospital because we will not be in clinic until Tuesday and he stated that he was in a lot of pain. Informed pt I will send a message over to  but could not make any promises when she would call back.

## 2024-09-05 ENCOUNTER — OFFICE VISIT (OUTPATIENT)
Dept: UROLOGY | Facility: CLINIC | Age: 74
End: 2024-09-05
Payer: MEDICARE

## 2024-09-05 VITALS
DIASTOLIC BLOOD PRESSURE: 72 MMHG | SYSTOLIC BLOOD PRESSURE: 139 MMHG | WEIGHT: 164 LBS | BODY MASS INDEX: 32.2 KG/M2 | HEIGHT: 60 IN

## 2024-09-05 DIAGNOSIS — N40.1 BENIGN PROSTATIC HYPERPLASIA WITH WEAK URINARY STREAM: Primary | ICD-10-CM

## 2024-09-05 DIAGNOSIS — R39.12 BENIGN PROSTATIC HYPERPLASIA WITH WEAK URINARY STREAM: Primary | ICD-10-CM

## 2024-09-05 DIAGNOSIS — N50.811 PAIN IN BOTH TESTICLES: ICD-10-CM

## 2024-09-05 DIAGNOSIS — N50.812 PAIN IN BOTH TESTICLES: ICD-10-CM

## 2024-09-05 LAB — URINE VOLUME: 28

## 2024-09-05 RX ORDER — DIAZEPAM 10 MG
10 TABLET ORAL ONCE
Qty: 1 TABLET | Refills: 0 | Status: SHIPPED | OUTPATIENT
Start: 2024-09-05 | End: 2024-09-05

## 2024-09-05 NOTE — PROGRESS NOTES
"    UROLOGY OFFICE FOLLOW UP NOTE    Subjective   HPI  Atilio Olson is a 73 y.o. male.  Presents for routine follow-up BPH treated aqua ablation 5/6/2024.  Overall, believes he is voiding well.  Still has some urgency and frequency but this has improved some.  Notes good force of stream but notes stream to be \"thin.\"  No dysuria or hematuria.    Complains of groin pain primarily on left side today.  Notes it to radiates into his scrotum.  Sometimes a burning sensation.  Not intolerable but bothersome.  Did have consultation with general surgery regarding inguinal hernia, undergoing expectant management.  History of Present Illness      ________  Prostate pathology, aqua ablation 5/6/2024: Benign prostatic hyperplasia    Results for orders placed or performed in visit on 09/05/24   Bladder Scan   Result Value Ref Range    Urine Volume 28          Review of systems  A review of systems was performed, and positive findings are noted in the HPI.    Objective     Vital Signs:   /72   Ht 152.4 cm (60\")   Wt 74.4 kg (164 lb)   BMI 32.03 kg/m²       Physical exam  No acute distress, well-nourished  Awake alert and oriented  Mood normal; affect normal  Physical Exam    : No penile lesions, circumcised male; no scrotal edema or erythema; bilateral testis of normal size, shape, consistency; no palpable cord abnormality, no tenderness to the epididymis bilaterally        Bladder Scan interpretation 09/05/2024    Estimation of residual urine via BVI 3000 Verathon Bladder Scan  Performed by: Mague Boo RN  Residual Urine: 28 ml  Indication: Benign prostatic hyperplasia with weak urinary stream    Pain in both testicles   Position: Supine  Examination: Incremental scanning of the suprapubic area using 2.0 MHz transducer using copious amounts of acoustic gel.   Findings: An anechoic area was demonstrated which represented the bladder, with measurement of residual urine as noted. I inspected this myself. In that the " residual urine was stable or insignificant, refer to plan for treatment and plan necessary at this time.     Problem List:  Patient Active Problem List   Diagnosis    Primary osteoarthritis of right hip    History of right knee joint replacement    Esophageal reflux    Arthritis    Bladder disorder    Chest pain    Congestive heart failure    Diabetes    Disorder of bursae and tendons in shoulder region    High cholesterol    Hypertension    Limb swelling    Stroke    Benign prostatic hyperplasia with weak urinary stream    BPH with urinary obstruction    BPH (benign prostatic hyperplasia)    Right lower quadrant abdominal pain       Assessment & Plan   Diagnoses and all orders for this visit:    1. Benign prostatic hyperplasia with weak urinary stream (Primary)  -     Bladder Scan  -     Cystoscopy; Future  -     diazePAM (Valium) 10 MG tablet; Take 1 tablet by mouth 1 (One) Time for 1 dose. Bring to urology appointment and take when instructed  Dispense: 1 tablet; Refill: 0    2. Pain in both testicles  -     US Scrotum & Testicles; Future      Emptying bladder with low PVR  Assessment & Plan      He reported persistent groin pain which fluctuates from side-to-side, physical exam findings reassuring   A scrotal ultrasound was ordered to rule out any abnormalities contributing to the pain.    Thin urinary stream.  He reported a persistently thin but strong urinary stream   A cystoscopy was recommended to evaluate  Per patient request, A prescription for Valium will be provided to be taken 15 minutes prior to the procedure.     The cystoscopy will be scheduled within 4 to 6 weeks, and the scrotal ultrasound will be performed beforehand.           Schedule cystoscopy, scrotal ultrasound prior  All questions addressed      Patient or patient representative verbalized consent for the use of Ambient Listening during the visit with  Rupinder Hankins MD for chart documentation. 9/6/2024  16:32 EDT

## 2024-09-06 ENCOUNTER — TELEPHONE (OUTPATIENT)
Dept: UROLOGY | Facility: CLINIC | Age: 74
End: 2024-09-06
Payer: MEDICARE

## 2024-09-06 NOTE — TELEPHONE ENCOUNTER
I added the words duplex Doppler to my order.  Within epic there is no other option for a different scrotal ultrasound that I can find.  Thanks

## 2024-09-06 NOTE — TELEPHONE ENCOUNTER
"I WAS INFORMED BY LELE THAT THE US ORDER NEEDS TO HAVE ADDED \"DUPLEX DOPPLER\"    ONCE ORDER HAS BEEN UPDATED IT WILL THEN NEED TO BE FAXED [phone].  PLEASE ROUTE BACK TO ME.  THANK YOU   "

## 2024-09-06 NOTE — TELEPHONE ENCOUNTER
LVM informing pt that his US Scrotum & Testicles is scheduled for Monday 9/9/24 at 4:00pm at [facility] in [facility]. Informed pt that he will need to arrive 30mins early.

## 2024-09-09 ENCOUNTER — HOSPITAL ENCOUNTER (OUTPATIENT)
Dept: OTHER | Facility: HOSPITAL | Age: 74
Discharge: HOME | End: 2024-09-09

## 2024-09-17 ENCOUNTER — TELEPHONE (OUTPATIENT)
Dept: SURGERY | Facility: CLINIC | Age: 74
End: 2024-09-17
Payer: MEDICARE

## 2024-09-30 RX ORDER — DIAZEPAM 10 MG
TABLET ORAL
COMMUNITY
Start: 2024-09-05

## 2024-10-03 ENCOUNTER — PROCEDURE VISIT (OUTPATIENT)
Dept: UROLOGY | Facility: CLINIC | Age: 74
End: 2024-10-03
Payer: MEDICARE

## 2024-10-03 VITALS
DIASTOLIC BLOOD PRESSURE: 72 MMHG | WEIGHT: 164 LBS | HEIGHT: 60 IN | SYSTOLIC BLOOD PRESSURE: 150 MMHG | BODY MASS INDEX: 32.2 KG/M2

## 2024-10-03 DIAGNOSIS — N50.3 EPIDIDYMAL CYST: ICD-10-CM

## 2024-10-03 DIAGNOSIS — N50.811 PAIN IN BOTH TESTICLES: ICD-10-CM

## 2024-10-03 DIAGNOSIS — N50.812 PAIN IN BOTH TESTICLES: ICD-10-CM

## 2024-10-03 DIAGNOSIS — R39.12 BENIGN PROSTATIC HYPERPLASIA WITH WEAK URINARY STREAM: Primary | ICD-10-CM

## 2024-10-03 DIAGNOSIS — N40.1 BENIGN PROSTATIC HYPERPLASIA WITH WEAK URINARY STREAM: Primary | ICD-10-CM

## 2024-10-03 DIAGNOSIS — N99.115 POSTPROCEDURAL MALE FOSSA NAVICULARIS URETHRAL STRICTURE: ICD-10-CM

## 2024-10-03 LAB — URINE VOLUME: 21

## 2024-10-03 NOTE — PROGRESS NOTES
"    UROLOGY OFFICE FOLLOW UP NOTE    Subjective   HPI  Atilio Olson is a 74 y.o. male.  Presents for routine follow-up BPH treated aqua ablation 5/6/2024.  Overall, believes he is voiding well.  Still has some urgency and frequency but this has improved some.  Notes good force of stream but notes stream to be \"thin.\"  No dysuria or hematuria.     Complains of groin pain primarily on left side today.  Notes it to radiates into his scrotum.  Sometimes a burning sensation.  Not intolerable but bothersome.  Did have consultation with general surgery regarding inguinal hernia, undergoing expectant management.    Update 10/4/2024: Presents today for cystoscopy; visit and cystoscopy suite.  Upon injection of urethral lidocaine, stricture in the fossa navicularis noted.  Patient had scrotal ultrasound prior to today's visit.  Notes pain will fluctuate between sides of scrotum however notes primarily posterior right.  History of Present Illness      ________________  scrotal ultrasound 9/9/2024: Cystic replacement of the epididymal head on the right   with the largest of the cyst measuring 3.1 cm maximally. Bilateral   hydroceles, slightly more complex on the right than left. No   intratesticular abnormalities.     Results for orders placed or performed in visit on 10/03/24   Bladder Scan   Result Value Ref Range    Urine Volume 21          Review of systems  A review of systems was performed, and positive findings are noted in the HPI.    Objective     Vital Signs:   /72   Ht 152.4 cm (60\")   Wt 74.4 kg (164 lb)   BMI 32.03 kg/m²       Physical exam  No acute distress, well-nourished  Awake alert and oriented  Mood normal; affect normal  CV regular rate, no chest retractions  Lungs clear, unlabored  Physical Exam    : Patent meatus however, upon injection of urethral lidocaine, tight stricture noted; would not accommodate 14 Mohawk urethral sound; unable to perform cystoscopy.  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   Findings: An anechoic area was demonstrated which represented the bladder, with measurement of residual urine as noted. I inspected this myself. In that the residual urine was stable or insignificant, refer to plan for treatment and plan necessary at this time.         Problem List:  Patient Active Problem List   Diagnosis    Primary osteoarthritis of right hip    History of right knee joint replacement    Esophageal reflux    Arthritis    Bladder disorder    Chest pain    Congestive heart failure    Diabetes    Disorder of bursae and tendons in shoulder region    High cholesterol    Hypertension    Limb swelling    Stroke    Benign prostatic hyperplasia with weak urinary stream    BPH with urinary obstruction    BPH (benign prostatic hyperplasia)    Right lower quadrant abdominal pain       Assessment & Plan   Diagnoses and all orders for this visit:    1. Benign prostatic hyperplasia with weak urinary stream (Primary)  -     Cystoscopy  -     Bladder Scan    2. Pain in both testicles  -     Bladder Scan    3. Epididymal cyst    4. 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  Tighter than 14 Marshallese  urethral sound  Warrants additional management via cystoscopy, urethral dilation    Emptying bladder for postvoid residual    Scrotal ultrasound results discussed with patient at length; sizable right epididymal cyst this is the area that patient is most sensitive to on exam.  No significant left-sided findings on scrotal ultrasound or exam.  Endorses history of pain which fluctuates of the groin as well as the scrotum side-to-side.  Discussed with patient at length and he noted explicit understanding that surgical intervention of his epididymal cyst on right may or may not improve his pain complaints and could even worsen it.  Offered to place referral to specialist removal; patient does not wish to obtain referral for additional evaluation, second opinion.  Wishes to proceed with epidermal cyst excision at time of cystoscopy with urethral dilation.  Aware of the risk of hematuria as well as need to go home with catheter.   Risks discussed including but not limited to bleeding, infection, damage to surrounding structures, recurrence of epidermal cyst, need for further procedures, and pain, and risk of anesthesia including up to death. Patient is understanding of these risks and is agreeable to proceed.    Schedule OR, cystoscopy, urethral dilation, right epididymal cyst excision, possible epididymectomy  Assessment & Plan       All questions addressed

## 2024-10-03 NOTE — H&P (VIEW-ONLY)
"    UROLOGY OFFICE FOLLOW UP NOTE    Subjective   HPI  Atilio Olson is a 74 y.o. male.  Presents for routine follow-up BPH treated aqua ablation 5/6/2024.  Overall, believes he is voiding well.  Still has some urgency and frequency but this has improved some.  Notes good force of stream but notes stream to be \"thin.\"  No dysuria or hematuria.     Complains of groin pain primarily on left side today.  Notes it to radiates into his scrotum.  Sometimes a burning sensation.  Not intolerable but bothersome.  Did have consultation with general surgery regarding inguinal hernia, undergoing expectant management.    Update 10/4/2024: Presents today for cystoscopy; visit and cystoscopy suite.  Upon injection of urethral lidocaine, stricture in the fossa navicularis noted.  Patient had scrotal ultrasound prior to today's visit.  Notes pain will fluctuate between sides of scrotum however notes primarily posterior right.  History of Present Illness      ________________  scrotal ultrasound 9/9/2024: Cystic replacement of the epididymal head on the right   with the largest of the cyst measuring 3.1 cm maximally. Bilateral   hydroceles, slightly more complex on the right than left. No   intratesticular abnormalities.     Results for orders placed or performed in visit on 10/03/24   Bladder Scan   Result Value Ref Range    Urine Volume 21          Review of systems  A review of systems was performed, and positive findings are noted in the HPI.    Objective     Vital Signs:   /72   Ht 152.4 cm (60\")   Wt 74.4 kg (164 lb)   BMI 32.03 kg/m²       Physical exam  No acute distress, well-nourished  Awake alert and oriented  Mood normal; affect normal  CV regular rate, no chest retractions  Lungs clear, unlabored  Physical Exam    : Patent meatus however, upon injection of urethral lidocaine, tight stricture noted; would not accommodate 14 Kiswahili urethral sound; unable to perform cystoscopy.  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   Findings: An anechoic area was demonstrated which represented the bladder, with measurement of residual urine as noted. I inspected this myself. In that the residual urine was stable or insignificant, refer to plan for treatment and plan necessary at this time.         Problem List:  Patient Active Problem List   Diagnosis    Primary osteoarthritis of right hip    History of right knee joint replacement    Esophageal reflux    Arthritis    Bladder disorder    Chest pain    Congestive heart failure    Diabetes    Disorder of bursae and tendons in shoulder region    High cholesterol    Hypertension    Limb swelling    Stroke    Benign prostatic hyperplasia with weak urinary stream    BPH with urinary obstruction    BPH (benign prostatic hyperplasia)    Right lower quadrant abdominal pain       Assessment & Plan   Diagnoses and all orders for this visit:    1. Benign prostatic hyperplasia with weak urinary stream (Primary)  -     Cystoscopy  -     Bladder Scan    2. Pain in both testicles  -     Bladder Scan    3. Epididymal cyst    4. 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  Tighter than 14 Mauritian  urethral sound  Warrants additional management via cystoscopy, urethral dilation    Emptying bladder for postvoid residual    Scrotal ultrasound results discussed with patient at length; sizable right epididymal cyst this is the area that patient is most sensitive to on exam.  No significant left-sided findings on scrotal ultrasound or exam.  Endorses history of pain which fluctuates of the groin as well as the scrotum side-to-side.  Discussed with patient at length and he noted explicit understanding that surgical intervention of his epididymal cyst on right may or may not improve his pain complaints and could even worsen it.  Offered to place referral to specialist removal; patient does not wish to obtain referral for additional evaluation, second opinion.  Wishes to proceed with epidermal cyst excision at time of cystoscopy with urethral dilation.  Aware of the risk of hematuria as well as need to go home with catheter.   Risks discussed including but not limited to bleeding, infection, damage to surrounding structures, recurrence of epidermal cyst, need for further procedures, and pain, and risk of anesthesia including up to death. Patient is understanding of these risks and is agreeable to proceed.    Schedule OR, cystoscopy, urethral dilation, right epididymal cyst excision, possible epididymectomy  Assessment & Plan       All questions addressed

## 2024-10-04 ENCOUNTER — PREP FOR SURGERY (OUTPATIENT)
Dept: OTHER | Facility: HOSPITAL | Age: 74
End: 2024-10-04
Payer: MEDICARE

## 2024-10-04 DIAGNOSIS — N50.811 PAIN IN BOTH TESTICLES: ICD-10-CM

## 2024-10-04 DIAGNOSIS — N99.115 POSTPROCEDURAL MALE FOSSA NAVICULARIS URETHRAL STRICTURE: ICD-10-CM

## 2024-10-04 DIAGNOSIS — N50.3 EPIDIDYMAL CYST: Primary | ICD-10-CM

## 2024-10-04 DIAGNOSIS — N50.812 PAIN IN BOTH TESTICLES: ICD-10-CM

## 2024-10-07 ENCOUNTER — TELEPHONE (OUTPATIENT)
Dept: UROLOGY | Facility: CLINIC | Age: 74
End: 2024-10-07
Payer: MEDICARE

## 2024-10-07 PROBLEM — N50.812 PAIN IN BOTH TESTICLES: Status: ACTIVE | Noted: 2024-10-04

## 2024-10-07 PROBLEM — N99.115 POSTPROCEDURAL MALE FOSSA NAVICULARIS URETHRAL STRICTURE: Status: ACTIVE | Noted: 2024-10-04

## 2024-10-07 PROBLEM — N50.3 EPIDIDYMAL CYST: Status: ACTIVE | Noted: 2024-10-04

## 2024-10-07 PROBLEM — N50.811 PAIN IN BOTH TESTICLES: Status: ACTIVE | Noted: 2024-10-04

## 2024-10-07 NOTE — TELEPHONE ENCOUNTER
LVM for call back. Calling to see if pt would be able to do 10-?  Phone PAT  Will send pt  with information   Hold aspirin and plavix 3 days prior

## 2024-10-17 NOTE — PRE-PROCEDURE INSTRUCTIONS
IMPORTANT INSTRUCTIONS - PRE-ADMISSION TESTING  DO NOT EAT OR CHEW anything after midnight the night before your procedure.    NPO AFTER MN EXCEPT SIPS OF WATER TO TAKE MEDICATIONS LISTED BELOW  Take the following medications the morning of your procedure with JUST A SIP OF WATER:  ____BACLOFEN, GABAPENTIN,LORATADINE, PROTONIX, ULTRAM IF NEEDED___________________________________________________________________________________________________________________________________________________________________________________    DO NOT BRING your medications to the hospital with you, UNLESS something has changed since your PRE-Admission Testing appointment.  Hold all vitamins, supplements, and NSAIDS (Non- steroidal anti-inflammatory meds) for one week prior to surgery (you MAY take Tylenol or Acetaminophen).  If you are diabetic, check your blood sugar the morning of your procedure. If it is less than 70 or if you are feeling symptomatic, call the following number for further instructions: [phone] _______.  Use your inhalers/nebulizers as usual, the morning of your procedure. BRING YOUR INHALERS with you.   Bring your CPAP or BIPAP to hospital, ONLY IF YOU WILL BE SPENDING THE NIGHT.   Make sure you have a ride home and have someone who will stay with you the day of your procedure after you go home.  If you have any questions, please call your Pre-Admission Testing Nurse, ___JONATHAN_____________ at [phone]____________.   Per anesthesia request, do not smoke for 24 hours before your procedure or as instructed by your surgeon.    WILL CALL ON  10/17/24       NORMALLY BETWEEN 1 AND 4 PM TO GIVE OFFICIAL ARRIVAL TIME FOR DAY OF PROCEDURE  BATHING INSTRUCTIONS GIVEN. NO JEWELRY OF ANY TYPE  COME TO ENTRANCE A, ELEVATOR A, 3RD FLOOR DAY OF PROCEDURE.  NO METFORMIN AFTER 6 PM ON 10/17/24  REPORTS HAS STOPPED ASA AND PLAVIX AND IS AWARE TO NOT TAKE ANOTHER DOSE PRIOR TO SURGERY ON 10/18/24  NO SMOKING 24 HOURS PRIOR TO  ARRIVAL

## 2024-10-18 ENCOUNTER — ANESTHESIA (OUTPATIENT)
Dept: PERIOP | Facility: HOSPITAL | Age: 74
End: 2024-10-18
Payer: MEDICARE

## 2024-10-18 ENCOUNTER — APPOINTMENT (OUTPATIENT)
Dept: CT IMAGING | Facility: HOSPITAL | Age: 74
DRG: 989 | End: 2024-10-18
Payer: MEDICARE

## 2024-10-18 ENCOUNTER — APPOINTMENT (OUTPATIENT)
Dept: GENERAL RADIOLOGY | Facility: HOSPITAL | Age: 74
DRG: 989 | End: 2024-10-18
Payer: MEDICARE

## 2024-10-18 ENCOUNTER — ANESTHESIA EVENT (OUTPATIENT)
Dept: PERIOP | Facility: HOSPITAL | Age: 74
End: 2024-10-18
Payer: MEDICARE

## 2024-10-18 ENCOUNTER — HOSPITAL ENCOUNTER (INPATIENT)
Facility: HOSPITAL | Age: 74
LOS: 1 days | Discharge: HOME | DRG: 989 | End: 2024-10-19
Attending: UROLOGY | Admitting: FAMILY MEDICINE
Payer: MEDICARE

## 2024-10-18 DIAGNOSIS — N40.1 BENIGN PROSTATIC HYPERPLASIA WITH WEAK URINARY STREAM: ICD-10-CM

## 2024-10-18 DIAGNOSIS — N50.811 PAIN IN BOTH TESTICLES: ICD-10-CM

## 2024-10-18 DIAGNOSIS — R39.12 BENIGN PROSTATIC HYPERPLASIA WITH WEAK URINARY STREAM: ICD-10-CM

## 2024-10-18 DIAGNOSIS — N50.3 EPIDIDYMAL CYST: ICD-10-CM

## 2024-10-18 DIAGNOSIS — N50.812 PAIN IN BOTH TESTICLES: ICD-10-CM

## 2024-10-18 DIAGNOSIS — N99.115 POSTPROCEDURAL MALE FOSSA NAVICULARIS URETHRAL STRICTURE: ICD-10-CM

## 2024-10-18 PROBLEM — R09.02 HYPOXIA: Status: ACTIVE | Noted: 2024-10-18

## 2024-10-18 LAB
ANION GAP SERPL CALCULATED.3IONS-SCNC: 7.9 MMOL/L (ref 5–15)
ARTERIAL PATENCY WRIST A: ABNORMAL
ATMOSPHERIC PRESS: 754.4 MMHG
BASE EXCESS BLDA CALC-SCNC: 1.1 MMOL/L (ref -2–2)
BASOPHILS # BLD AUTO: 0.08 10*3/MM3 (ref 0–0.2)
BASOPHILS NFR BLD AUTO: 1.2 % (ref 0–1.5)
BDY SITE: ABNORMAL
BUN SERPL-MCNC: 20 MG/DL (ref 8–23)
BUN/CREAT SERPL: 19.4 (ref 7–25)
CALCIUM SPEC-SCNC: 9.5 MG/DL (ref 8.6–10.5)
CHLORIDE SERPL-SCNC: 105 MMOL/L (ref 98–107)
CHOLEST SERPL-MCNC: 125 MG/DL (ref 0–200)
CO2 SERPL-SCNC: 26.1 MMOL/L (ref 22–29)
CREAT SERPL-MCNC: 1.03 MG/DL (ref 0.76–1.27)
DEPRECATED RDW RBC AUTO: 40.2 FL (ref 37–54)
EGFRCR SERPLBLD CKD-EPI 2021: 76.7 ML/MIN/1.73
EOSINOPHIL # BLD AUTO: 0.41 10*3/MM3 (ref 0–0.4)
EOSINOPHIL NFR BLD AUTO: 6 % (ref 0.3–6.2)
ERYTHROCYTE [DISTWIDTH] IN BLOOD BY AUTOMATED COUNT: 12.9 % (ref 12.3–15.4)
GAS FLOW AIRWAY: 6 LPM
GLUCOSE BLDC GLUCOMTR-MCNC: 169 MG/DL (ref 70–99)
GLUCOSE BLDC GLUCOMTR-MCNC: 184 MG/DL (ref 70–99)
GLUCOSE SERPL-MCNC: 166 MG/DL (ref 65–99)
HBA1C MFR BLD: 7.1 % (ref 4.8–5.6)
HCO3 BLDA-SCNC: 27.2 MMOL/L (ref 22–26)
HCT VFR BLD AUTO: 39.7 % (ref 37.5–51)
HCT VFR BLD CALC: 39 % (ref 38–51)
HDLC SERPL-MCNC: 32 MG/DL (ref 40–60)
HEMODILUTION: NO
HGB BLD-MCNC: 13.3 G/DL (ref 13–17.7)
HGB BLDA-MCNC: 13.3 G/DL (ref 12–18)
IMM GRANULOCYTES # BLD AUTO: 0.01 10*3/MM3 (ref 0–0.05)
IMM GRANULOCYTES NFR BLD AUTO: 0.1 % (ref 0–0.5)
LDLC SERPL CALC-MCNC: 67 MG/DL (ref 0–100)
LDLC/HDLC SERPL: 1.98 {RATIO}
LYMPHOCYTES # BLD AUTO: 1.19 10*3/MM3 (ref 0.7–3.1)
LYMPHOCYTES NFR BLD AUTO: 17.4 % (ref 19.6–45.3)
MCH RBC QN AUTO: 29 PG (ref 26.6–33)
MCHC RBC AUTO-ENTMCNC: 33.5 G/DL (ref 31.5–35.7)
MCV RBC AUTO: 86.5 FL (ref 79–97)
MODALITY: ABNORMAL
MONOCYTES # BLD AUTO: 0.54 10*3/MM3 (ref 0.1–0.9)
MONOCYTES NFR BLD AUTO: 7.9 % (ref 5–12)
NEUTROPHILS NFR BLD AUTO: 4.6 10*3/MM3 (ref 1.7–7)
NEUTROPHILS NFR BLD AUTO: 67.4 % (ref 42.7–76)
NRBC BLD AUTO-RTO: 0 /100 WBC (ref 0–0.2)
PCO2 BLDA: 48.3 MM HG (ref 35–45)
PH BLDA: 7.36 PH UNITS (ref 7.35–7.45)
PLATELET # BLD AUTO: 302 10*3/MM3 (ref 140–450)
PMV BLD AUTO: 8.9 FL (ref 6–12)
PO2 BLDA: 65.1 MM HG (ref 80–100)
POTASSIUM SERPL-SCNC: 4.4 MMOL/L (ref 3.5–5.2)
RBC # BLD AUTO: 4.59 10*6/MM3 (ref 4.14–5.8)
SAO2 % BLDCOA: 91.3 % (ref 95–99)
SODIUM SERPL-SCNC: 139 MMOL/L (ref 136–145)
TRIGL SERPL-MCNC: 149 MG/DL (ref 0–150)
TROPONIN T SERPL HS-MCNC: 24 NG/L
TROPONIN T SERPL HS-MCNC: 25 NG/L
TSH SERPL DL<=0.05 MIU/L-ACNC: 1.93 UIU/ML (ref 0.27–4.2)
VLDLC SERPL-MCNC: 26 MG/DL (ref 5–40)
WBC NRBC COR # BLD AUTO: 6.83 10*3/MM3 (ref 3.4–10.8)
WHOLE BLOOD HOLD SPECIMEN: NORMAL

## 2024-10-18 PROCEDURE — 25010000002 ENOXAPARIN PER 10 MG: Performed by: FAMILY MEDICINE

## 2024-10-18 PROCEDURE — 25010000002 CEFAZOLIN PER 500 MG: Performed by: UROLOGY

## 2024-10-18 PROCEDURE — 83036 HEMOGLOBIN GLYCOSYLATED A1C: CPT | Performed by: FAMILY MEDICINE

## 2024-10-18 PROCEDURE — 82948 REAGENT STRIP/BLOOD GLUCOSE: CPT

## 2024-10-18 PROCEDURE — 84484 ASSAY OF TROPONIN QUANT: CPT | Performed by: FAMILY MEDICINE

## 2024-10-18 PROCEDURE — 25010000002 HYDROMORPHONE 1 MG/ML SOLUTION

## 2024-10-18 PROCEDURE — 71260 CT THORAX DX C+: CPT

## 2024-10-18 PROCEDURE — 0T7D8ZZ DILATION OF URETHRA, VIA NATURAL OR ARTIFICIAL OPENING ENDOSCOPIC: ICD-10-PCS | Performed by: UROLOGY

## 2024-10-18 PROCEDURE — 0VBJ0ZZ EXCISION OF RIGHT EPIDIDYMIS, OPEN APPROACH: ICD-10-PCS | Performed by: UROLOGY

## 2024-10-18 PROCEDURE — 54860 REMOVAL OF EPIDIDYMIS: CPT | Performed by: UROLOGY

## 2024-10-18 PROCEDURE — 93005 ELECTROCARDIOGRAM TRACING: CPT | Performed by: ANESTHESIOLOGY

## 2024-10-18 PROCEDURE — 82803 BLOOD GASES ANY COMBINATION: CPT | Performed by: FAMILY MEDICINE

## 2024-10-18 PROCEDURE — 80048 BASIC METABOLIC PNL TOTAL CA: CPT | Performed by: ANESTHESIOLOGY

## 2024-10-18 PROCEDURE — 80061 LIPID PANEL: CPT | Performed by: FAMILY MEDICINE

## 2024-10-18 PROCEDURE — 25010000002 MORPHINE PER 10 MG: Performed by: FAMILY MEDICINE

## 2024-10-18 PROCEDURE — 52281 CYSTOSCOPY AND TREATMENT: CPT | Performed by: UROLOGY

## 2024-10-18 PROCEDURE — 93010 ELECTROCARDIOGRAM REPORT: CPT | Performed by: INTERNAL MEDICINE

## 2024-10-18 PROCEDURE — 25010000002 FENTANYL CITRATE (PF) 50 MCG/ML SOLUTION

## 2024-10-18 PROCEDURE — 85025 COMPLETE CBC W/AUTO DIFF WBC: CPT | Performed by: UROLOGY

## 2024-10-18 PROCEDURE — 25010000002 PROPOFOL 200 MG/20ML EMULSION

## 2024-10-18 PROCEDURE — 25010000002 MIDAZOLAM PER 1MG: Performed by: ANESTHESIOLOGY

## 2024-10-18 PROCEDURE — 99223 1ST HOSP IP/OBS HIGH 75: CPT | Performed by: FAMILY MEDICINE

## 2024-10-18 PROCEDURE — 25810000003 LACTATED RINGERS PER 1000 ML: Performed by: ANESTHESIOLOGY

## 2024-10-18 PROCEDURE — 25010000002 DEXAMETHASONE PER 1 MG

## 2024-10-18 PROCEDURE — 84443 ASSAY THYROID STIM HORMONE: CPT | Performed by: FAMILY MEDICINE

## 2024-10-18 PROCEDURE — 71045 X-RAY EXAM CHEST 1 VIEW: CPT

## 2024-10-18 PROCEDURE — 25010000002 BUPIVACAINE (PF) 0.5 % SOLUTION: Performed by: UROLOGY

## 2024-10-18 PROCEDURE — 88304 TISSUE EXAM BY PATHOLOGIST: CPT | Performed by: UROLOGY

## 2024-10-18 PROCEDURE — 84484 ASSAY OF TROPONIN QUANT: CPT | Performed by: INTERNAL MEDICINE

## 2024-10-18 PROCEDURE — 25010000002 KETOROLAC TROMETHAMINE PER 15 MG: Performed by: ANESTHESIOLOGY

## 2024-10-18 PROCEDURE — 25810000003 LACTATED RINGERS PER 1000 ML: Performed by: FAMILY MEDICINE

## 2024-10-18 PROCEDURE — 36600 WITHDRAWAL OF ARTERIAL BLOOD: CPT | Performed by: FAMILY MEDICINE

## 2024-10-18 PROCEDURE — 25510000001 IOPAMIDOL PER 1 ML: Performed by: FAMILY MEDICINE

## 2024-10-18 PROCEDURE — 25010000002 ONDANSETRON PER 1 MG

## 2024-10-18 PROCEDURE — 25010000002 LIDOCAINE PF 2% 2 % SOLUTION

## 2024-10-18 RX ORDER — LIDOCAINE HYDROCHLORIDE 20 MG/ML
INJECTION, SOLUTION EPIDURAL; INFILTRATION; INTRACAUDAL; PERINEURAL AS NEEDED
Status: DISCONTINUED | OUTPATIENT
Start: 2024-10-18 | End: 2024-10-18 | Stop reason: SURG

## 2024-10-18 RX ORDER — ONDANSETRON 2 MG/ML
4 INJECTION INTRAMUSCULAR; INTRAVENOUS EVERY 6 HOURS PRN
Status: DISCONTINUED | OUTPATIENT
Start: 2024-10-18 | End: 2024-10-19 | Stop reason: HOSPADM

## 2024-10-18 RX ORDER — PROMETHAZINE HYDROCHLORIDE 25 MG/1
25 SUPPOSITORY RECTAL ONCE AS NEEDED
Status: DISCONTINUED | OUTPATIENT
Start: 2024-10-18 | End: 2024-10-18 | Stop reason: HOSPADM

## 2024-10-18 RX ORDER — CLOPIDOGREL BISULFATE 75 MG/1
75 TABLET ORAL DAILY
Start: 2024-10-22 | End: 2024-10-19

## 2024-10-18 RX ORDER — KETOROLAC TROMETHAMINE 30 MG/ML
30 INJECTION, SOLUTION INTRAMUSCULAR; INTRAVENOUS ONCE AS NEEDED
Status: COMPLETED | OUTPATIENT
Start: 2024-10-18 | End: 2024-10-18

## 2024-10-18 RX ORDER — IPRATROPIUM BROMIDE AND ALBUTEROL SULFATE 2.5; .5 MG/3ML; MG/3ML
3 SOLUTION RESPIRATORY (INHALATION) ONCE
Status: COMPLETED | OUTPATIENT
Start: 2024-10-18 | End: 2024-10-18

## 2024-10-18 RX ORDER — POLYETHYLENE GLYCOL 3350 17 G/17G
17 POWDER, FOR SOLUTION ORAL DAILY PRN
Status: DISCONTINUED | OUTPATIENT
Start: 2024-10-18 | End: 2024-10-19 | Stop reason: HOSPADM

## 2024-10-18 RX ORDER — EPHEDRINE SULFATE 50 MG/ML
INJECTION INTRAVENOUS AS NEEDED
Status: DISCONTINUED | OUTPATIENT
Start: 2024-10-18 | End: 2024-10-18 | Stop reason: SURG

## 2024-10-18 RX ORDER — BISACODYL 5 MG/1
5 TABLET, DELAYED RELEASE ORAL DAILY PRN
Status: DISCONTINUED | OUTPATIENT
Start: 2024-10-18 | End: 2024-10-19 | Stop reason: HOSPADM

## 2024-10-18 RX ORDER — IBUPROFEN 600 MG/1
600 TABLET, FILM COATED ORAL EVERY 6 HOURS PRN
Status: DISCONTINUED | OUTPATIENT
Start: 2024-10-18 | End: 2024-10-18 | Stop reason: HOSPADM

## 2024-10-18 RX ORDER — MORPHINE SULFATE 2 MG/ML
2 INJECTION, SOLUTION INTRAMUSCULAR; INTRAVENOUS EVERY 4 HOURS PRN
Status: DISCONTINUED | OUTPATIENT
Start: 2024-10-18 | End: 2024-10-19 | Stop reason: HOSPADM

## 2024-10-18 RX ORDER — BACITRACIN ZINC AND POLYMYXIN B SULFATE 500; 1000 [USP'U]/G; [USP'U]/G
OINTMENT TOPICAL
Qty: 28.4 G | Refills: 0 | Status: SHIPPED | OUTPATIENT
Start: 2024-10-18

## 2024-10-18 RX ORDER — PROMETHAZINE HYDROCHLORIDE 25 MG/1
12.5 TABLET ORAL ONCE AS NEEDED
Status: DISCONTINUED | OUTPATIENT
Start: 2024-10-18 | End: 2024-10-19 | Stop reason: HOSPADM

## 2024-10-18 RX ORDER — BISACODYL 5 MG/1
5 TABLET, DELAYED RELEASE ORAL DAILY PRN
Status: DISCONTINUED | OUTPATIENT
Start: 2024-10-18 | End: 2024-10-18

## 2024-10-18 RX ORDER — BISACODYL 10 MG
10 SUPPOSITORY, RECTAL RECTAL DAILY PRN
Status: DISCONTINUED | OUTPATIENT
Start: 2024-10-18 | End: 2024-10-19 | Stop reason: HOSPADM

## 2024-10-18 RX ORDER — SODIUM CHLORIDE, SODIUM LACTATE, POTASSIUM CHLORIDE, CALCIUM CHLORIDE 600; 310; 30; 20 MG/100ML; MG/100ML; MG/100ML; MG/100ML
75 INJECTION, SOLUTION INTRAVENOUS CONTINUOUS
Status: DISCONTINUED | OUTPATIENT
Start: 2024-10-18 | End: 2024-10-19

## 2024-10-18 RX ORDER — ACETAMINOPHEN 500 MG
1000 TABLET ORAL ONCE
Status: COMPLETED | OUTPATIENT
Start: 2024-10-18 | End: 2024-10-18

## 2024-10-18 RX ORDER — ONDANSETRON 2 MG/ML
4 INJECTION INTRAMUSCULAR; INTRAVENOUS ONCE AS NEEDED
Status: DISCONTINUED | OUTPATIENT
Start: 2024-10-18 | End: 2024-10-18

## 2024-10-18 RX ORDER — NITROGLYCERIN 0.4 MG/1
0.4 TABLET SUBLINGUAL
Status: DISCONTINUED | OUTPATIENT
Start: 2024-10-18 | End: 2024-10-19 | Stop reason: HOSPADM

## 2024-10-18 RX ORDER — OXYBUTYNIN CHLORIDE 5 MG/1
5 TABLET ORAL 3 TIMES DAILY PRN
Qty: 12 TABLET | Refills: 0 | Status: SHIPPED | OUTPATIENT
Start: 2024-10-18 | End: 2024-10-30

## 2024-10-18 RX ORDER — AMOXICILLIN 250 MG
2 CAPSULE ORAL 2 TIMES DAILY
Status: DISCONTINUED | OUTPATIENT
Start: 2024-10-18 | End: 2024-10-18

## 2024-10-18 RX ORDER — ACETAMINOPHEN 325 MG/1
650 TABLET ORAL ONCE
Status: DISCONTINUED | OUTPATIENT
Start: 2024-10-18 | End: 2024-10-19 | Stop reason: HOSPADM

## 2024-10-18 RX ORDER — SODIUM CHLORIDE 0.9 % (FLUSH) 0.9 %
10 SYRINGE (ML) INJECTION AS NEEDED
Status: DISCONTINUED | OUTPATIENT
Start: 2024-10-18 | End: 2024-10-19 | Stop reason: HOSPADM

## 2024-10-18 RX ORDER — PROPOFOL 10 MG/ML
INJECTION, EMULSION INTRAVENOUS AS NEEDED
Status: DISCONTINUED | OUTPATIENT
Start: 2024-10-18 | End: 2024-10-18 | Stop reason: SURG

## 2024-10-18 RX ORDER — AMOXICILLIN 250 MG
2 CAPSULE ORAL 2 TIMES DAILY PRN
Status: DISCONTINUED | OUTPATIENT
Start: 2024-10-18 | End: 2024-10-19 | Stop reason: HOSPADM

## 2024-10-18 RX ORDER — SODIUM CHLORIDE, SODIUM LACTATE, POTASSIUM CHLORIDE, CALCIUM CHLORIDE 600; 310; 30; 20 MG/100ML; MG/100ML; MG/100ML; MG/100ML
20 INJECTION, SOLUTION INTRAVENOUS CONTINUOUS PRN
Status: DISCONTINUED | OUTPATIENT
Start: 2024-10-18 | End: 2024-10-18

## 2024-10-18 RX ORDER — IPRATROPIUM BROMIDE AND ALBUTEROL SULFATE 2.5; .5 MG/3ML; MG/3ML
3 SOLUTION RESPIRATORY (INHALATION)
Status: DISCONTINUED | OUTPATIENT
Start: 2024-10-18 | End: 2024-10-18

## 2024-10-18 RX ORDER — DEXAMETHASONE SODIUM PHOSPHATE 4 MG/ML
INJECTION, SOLUTION INTRA-ARTICULAR; INTRALESIONAL; INTRAMUSCULAR; INTRAVENOUS; SOFT TISSUE AS NEEDED
Status: DISCONTINUED | OUTPATIENT
Start: 2024-10-18 | End: 2024-10-18 | Stop reason: SURG

## 2024-10-18 RX ORDER — IPRATROPIUM BROMIDE AND ALBUTEROL SULFATE 2.5; .5 MG/3ML; MG/3ML
3 SOLUTION RESPIRATORY (INHALATION) EVERY 4 HOURS PRN
Status: DISCONTINUED | OUTPATIENT
Start: 2024-10-18 | End: 2024-10-19 | Stop reason: HOSPADM

## 2024-10-18 RX ORDER — ASPIRIN 81 MG/1
81 TABLET ORAL DAILY
Start: 2024-10-21 | End: 2024-10-19

## 2024-10-18 RX ORDER — MIDAZOLAM HYDROCHLORIDE 2 MG/2ML
2 INJECTION, SOLUTION INTRAMUSCULAR; INTRAVENOUS ONCE
Status: COMPLETED | OUTPATIENT
Start: 2024-10-18 | End: 2024-10-18

## 2024-10-18 RX ORDER — SODIUM CHLORIDE 0.9 % (FLUSH) 0.9 %
10 SYRINGE (ML) INJECTION EVERY 12 HOURS SCHEDULED
Status: DISCONTINUED | OUTPATIENT
Start: 2024-10-18 | End: 2024-10-19 | Stop reason: HOSPADM

## 2024-10-18 RX ORDER — PANTOPRAZOLE SODIUM 20 MG/1
20 TABLET, DELAYED RELEASE ORAL DAILY
Status: DISCONTINUED | OUTPATIENT
Start: 2024-10-19 | End: 2024-10-19 | Stop reason: HOSPADM

## 2024-10-18 RX ORDER — TEMAZEPAM 15 MG/1
15 CAPSULE ORAL NIGHTLY PRN
Status: DISCONTINUED | OUTPATIENT
Start: 2024-10-18 | End: 2024-10-19 | Stop reason: HOSPADM

## 2024-10-18 RX ORDER — POLYETHYLENE GLYCOL 3350 17 G/17G
17 POWDER, FOR SOLUTION ORAL DAILY PRN
Status: DISCONTINUED | OUTPATIENT
Start: 2024-10-18 | End: 2024-10-18

## 2024-10-18 RX ORDER — FENTANYL CITRATE 50 UG/ML
INJECTION, SOLUTION INTRAMUSCULAR; INTRAVENOUS AS NEEDED
Status: DISCONTINUED | OUTPATIENT
Start: 2024-10-18 | End: 2024-10-18 | Stop reason: SURG

## 2024-10-18 RX ORDER — ENOXAPARIN SODIUM 100 MG/ML
40 INJECTION SUBCUTANEOUS DAILY
Status: DISCONTINUED | OUTPATIENT
Start: 2024-10-18 | End: 2024-10-19 | Stop reason: HOSPADM

## 2024-10-18 RX ORDER — BUPIVACAINE HYDROCHLORIDE 5 MG/ML
INJECTION, SOLUTION EPIDURAL; INTRACAUDAL AS NEEDED
Status: DISCONTINUED | OUTPATIENT
Start: 2024-10-18 | End: 2024-10-18 | Stop reason: HOSPADM

## 2024-10-18 RX ORDER — ONDANSETRON 2 MG/ML
INJECTION INTRAMUSCULAR; INTRAVENOUS AS NEEDED
Status: DISCONTINUED | OUTPATIENT
Start: 2024-10-18 | End: 2024-10-18 | Stop reason: SURG

## 2024-10-18 RX ORDER — BISACODYL 10 MG
10 SUPPOSITORY, RECTAL RECTAL DAILY PRN
Status: DISCONTINUED | OUTPATIENT
Start: 2024-10-18 | End: 2024-10-18

## 2024-10-18 RX ORDER — OXYCODONE HYDROCHLORIDE 5 MG/1
5 TABLET ORAL
Status: COMPLETED | OUTPATIENT
Start: 2024-10-18 | End: 2024-10-18

## 2024-10-18 RX ORDER — OXYCODONE AND ACETAMINOPHEN 5; 325 MG/1; MG/1
.5-2 TABLET ORAL EVERY 6 HOURS PRN
Qty: 16 TABLET | Refills: 0 | Status: SHIPPED | OUTPATIENT
Start: 2024-10-18 | End: 2024-10-30

## 2024-10-18 RX ORDER — IOPAMIDOL 755 MG/ML
100 INJECTION, SOLUTION INTRAVASCULAR
Status: COMPLETED | OUTPATIENT
Start: 2024-10-18 | End: 2024-10-18

## 2024-10-18 RX ORDER — PROMETHAZINE HYDROCHLORIDE 12.5 MG/1
25 TABLET ORAL ONCE AS NEEDED
Status: DISCONTINUED | OUTPATIENT
Start: 2024-10-18 | End: 2024-10-18 | Stop reason: HOSPADM

## 2024-10-18 RX ORDER — ONDANSETRON 4 MG/1
4 TABLET, ORALLY DISINTEGRATING ORAL ONCE AS NEEDED
Status: DISCONTINUED | OUTPATIENT
Start: 2024-10-18 | End: 2024-10-19 | Stop reason: HOSPADM

## 2024-10-18 RX ORDER — GINSENG 100 MG
CAPSULE ORAL AS NEEDED
Status: DISCONTINUED | OUTPATIENT
Start: 2024-10-18 | End: 2024-10-18 | Stop reason: HOSPADM

## 2024-10-18 RX ADMIN — HYDROMORPHONE HYDROCHLORIDE 0.5 MG: 1 INJECTION, SOLUTION INTRAMUSCULAR; INTRAVENOUS; SUBCUTANEOUS at 13:35

## 2024-10-18 RX ADMIN — IOPAMIDOL 65 ML: 755 INJECTION, SOLUTION INTRAVENOUS at 21:47

## 2024-10-18 RX ADMIN — MIDAZOLAM HYDROCHLORIDE 2 MG: 1 INJECTION, SOLUTION INTRAMUSCULAR; INTRAVENOUS at 11:19

## 2024-10-18 RX ADMIN — SODIUM CHLORIDE 2000 MG: 9 INJECTION, SOLUTION INTRAVENOUS at 12:05

## 2024-10-18 RX ADMIN — TEMAZEPAM 15 MG: 15 CAPSULE ORAL at 23:06

## 2024-10-18 RX ADMIN — KETOROLAC TROMETHAMINE 30 MG: 30 INJECTION, SOLUTION INTRAMUSCULAR; INTRAVENOUS at 16:41

## 2024-10-18 RX ADMIN — HYDROMORPHONE HYDROCHLORIDE 0.5 MG: 1 INJECTION, SOLUTION INTRAMUSCULAR; INTRAVENOUS; SUBCUTANEOUS at 13:49

## 2024-10-18 RX ADMIN — FENTANYL CITRATE 50 MCG: 50 INJECTION, SOLUTION INTRAMUSCULAR; INTRAVENOUS at 11:53

## 2024-10-18 RX ADMIN — ACETAMINOPHEN 1000 MG: 500 TABLET ORAL at 11:19

## 2024-10-18 RX ADMIN — EPHEDRINE SULFATE 10 MG: 50 INJECTION INTRAVENOUS at 12:10

## 2024-10-18 RX ADMIN — SODIUM CHLORIDE, POTASSIUM CHLORIDE, SODIUM LACTATE AND CALCIUM CHLORIDE 20 ML/HR: 600; 310; 30; 20 INJECTION, SOLUTION INTRAVENOUS at 11:18

## 2024-10-18 RX ADMIN — Medication 10 ML: at 21:09

## 2024-10-18 RX ADMIN — SODIUM CHLORIDE, POTASSIUM CHLORIDE, SODIUM LACTATE AND CALCIUM CHLORIDE 75 ML/HR: 600; 310; 30; 20 INJECTION, SOLUTION INTRAVENOUS at 21:03

## 2024-10-18 RX ADMIN — FENTANYL CITRATE 50 MCG: 50 INJECTION, SOLUTION INTRAMUSCULAR; INTRAVENOUS at 12:32

## 2024-10-18 RX ADMIN — ONDANSETRON 4 MG: 2 INJECTION INTRAMUSCULAR; INTRAVENOUS at 13:03

## 2024-10-18 RX ADMIN — LIDOCAINE HYDROCHLORIDE 80 MG: 20 INJECTION, SOLUTION EPIDURAL; INFILTRATION; INTRACAUDAL; PERINEURAL at 11:53

## 2024-10-18 RX ADMIN — ENOXAPARIN SODIUM 40 MG: 100 INJECTION SUBCUTANEOUS at 21:08

## 2024-10-18 RX ADMIN — EPHEDRINE SULFATE 10 MG: 50 INJECTION INTRAVENOUS at 12:31

## 2024-10-18 RX ADMIN — MORPHINE SULFATE 2 MG: 2 INJECTION, SOLUTION INTRAMUSCULAR; INTRAVENOUS at 23:05

## 2024-10-18 RX ADMIN — OXYCODONE 5 MG: 5 TABLET ORAL at 13:37

## 2024-10-18 RX ADMIN — DEXAMETHASONE SODIUM PHOSPHATE 4 MG: 4 INJECTION, SOLUTION INTRAMUSCULAR; INTRAVENOUS at 12:05

## 2024-10-18 RX ADMIN — FENTANYL CITRATE 50 MCG: 50 INJECTION, SOLUTION INTRAMUSCULAR; INTRAVENOUS at 12:20

## 2024-10-18 RX ADMIN — IPRATROPIUM BROMIDE AND ALBUTEROL SULFATE 3 ML: .5; 3 SOLUTION RESPIRATORY (INHALATION) at 16:41

## 2024-10-18 RX ADMIN — PROPOFOL 150 MG: 10 INJECTION, EMULSION INTRAVENOUS at 11:53

## 2024-10-18 RX ADMIN — EPHEDRINE SULFATE 5 MG: 50 INJECTION INTRAVENOUS at 12:04

## 2024-10-18 RX ADMIN — OXYCODONE 5 MG: 5 TABLET ORAL at 14:39

## 2024-10-18 RX ADMIN — FENTANYL CITRATE 50 MCG: 50 INJECTION, SOLUTION INTRAMUSCULAR; INTRAVENOUS at 12:05

## 2024-10-18 RX ADMIN — EPHEDRINE SULFATE 10 MG: 50 INJECTION INTRAVENOUS at 12:07

## 2024-10-18 NOTE — NURSING NOTE
Late entry   1405 patient complained of chest pain and left arm tingling like pins and needles.  EKG ordered and completed. Decision made to continue to monitor. Patient had been lying on left side with left shoulder under him and bp cuff was on left upper arm.  Patient was repositioned on back and bp cuff moved to rt arm.  Patient continued to complain of pins and needle sensation but not chest pain. Patient had had cardiac cath in February which ruled out ischemia, current EKG not concerning for ischemia. Patient also having trouble keeping oxygen saturations up. Wife stated this is common for him after surgery as he does not take deep breaths.  Patient using incentive spirometer but only raising to 750 to 1000. Will continue to work with cough and deep breathing weaning to nasal canula 3 l.

## 2024-10-18 NOTE — DISCHARGE INSTRUCTIONS
DISCHARGE INSTRUCTIONS CYSTOSCOPY      For your surgery you had:  Monitored anesthesia care  You may experience dizziness, drowsiness, or lightheadedness for several hours following surgery.  Do not stay alone today or tonight.  Limit your activity for 24 hours.  You should not drive, operate machinery, drink alcohol, or sign legally binding documents for 24 hours or while you are taking pain medication.  Resume your diet slowly.  Follow any special dietary instructions you may have been given by your doctor.    NOTIFY YOUR DOCTOR IF YOU EXPERIENCE ANY OF THE FOLLOWING:  Temperature greater than 101 degrees Fahrenheit  Shaking Chills  Redness or excessive drainage from incision  Nausea, vomiting and/or pain that is not controlled by prescribed medications  Increase in bleeding or bleeding that is excessive  Unable to urinate in 6 hours after surgery  If unable to reach your doctor, please go to the closest Emergency Room   Following your cystoscopy exam, you may experience burning upon urination.  You may also pass some bloody urine.  If the burning sensation and/or bloody urine should persist beyond 48 hours, call your doctor.  To encourage kidney and bladder function, you should drink as much fluid as possible.  If you have difficulty urinating, try sitting in a bath tub of warm water.  If you become uncomfortable because you cannot urinate, call your doctor or come to the Emergency Room at the hospital.      SPECIAL INSTRUCTIONS:  See Dr. Hankins's instructions on After Visit Summary.      Last dose of pain medication given at:  Tylenol (1000mg) last at  11:19am. Do not exceed 4000mg of tylenol in a 24 hour period.  Oxycodone last at 1:37pm. May take percocet next at 7:37pm if needed.

## 2024-10-18 NOTE — ANESTHESIA PREPROCEDURE EVALUATION
Anesthesia Evaluation     Patient summary reviewed and Nursing notes reviewed   no history of anesthetic complications:   NPO Solid Status: > 8 hours  NPO Liquid Status: > 2 hours           Airway   Mallampati: III  TM distance: >3 FB  Neck ROM: full  No difficulty expected  Dental      Pulmonary - negative pulmonary ROS and normal exam    breath sounds clear to auscultation  Cardiovascular - normal exam  Exercise tolerance: good (4-7 METS)    ECG reviewed  Rhythm: regular  Rate: normal    (+) hypertension, past MI , CHF , DVT, hyperlipidemia      Neuro/Psych  (+) CVA, numbness  GI/Hepatic/Renal/Endo    (+) GERD, diabetes mellitus type 2    Musculoskeletal     Abdominal    Substance History - negative use     OB/GYN negative ob/gyn ROS         Other   arthritis,     ROS/Med Hx Other: cath 02/23 nl coronary arteries, hx of cva (2006), dm, hx of dvt, medical clearance on chart for blood thinners    EKG nsr, supraventricular bigeminy rbbb    ABNORMAL ECG - 5/1/24  Sinus rhythm  Supraventricular bigeminy  Right bundle branch block  Minimal ST elevation, inferior leads  When compared with ECG of 02-Feb-2024 10:45:21,  No significant change                    Anesthesia Plan    ASA 3     general     (Patient understands anesthesia not responsible for dental damage.)  intravenous induction     Anesthetic plan, risks, benefits, and alternatives have been provided, discussed and informed consent has been obtained with: patient.    Use of blood products discussed with patient .    Plan discussed with CRNA.        CODE STATUS:

## 2024-10-18 NOTE — OP NOTE
Preoperative diagnosis  Urethral stricture; right epididymal cyst, orchialgia    Postoperative diagnosis   urethral stricture, right epididymal cyst, orchialgia    Procedure performed  Cystoscopy urethral dilation  Right epididymectomy with cyst excision    Attending surgeon  Rupinder Hankins MD     Anesthesia  General    EBL  5 mL    Complications  None    Specimen  Right epididymis    Findings  Approximate 12 Welsh distal urethral stricture of the fossa navicularis; dilated without difficulty to 26 Welsh with urethral sounds; 18 Welsh Carter catheter    No other urethral abnormalities; patent prostatic urethra, TUR defect noted; no bladder mucosal abnormalities noted; bilateral orthotopic ureters    Normal-appearing right testis; cord lipoma noted, excised; cluster of right epididymal cyst requiring epididymectomy for removal    Indications  74 y.o. male agreed to undergo the above named procedure after discussion of the alternatives, risks and benefits.   Informed consent was obtained.      Procedure  After informed consent was obtained, anesthesia was administered.  Once patient was adequately anesthetized, he was placed in dorsolithotomy position.  Genitals prepped and draped in normal sterile fashion.  Timeout was performed.  Upon inspection the meatus appeared patent.  However, inspection with the cystoscope revealed tight distal urethral stricture of the fossa navicularis, approximately 12 Welsh.  Keri sounds was then used to sequentially dilate the stricture without issue.  The stricture was dilated up to 26 Welsh without issue.  After completion of dilation the urethra easily accommodated the rigid cystoscope.  There were no other urethral abnormalities noted.  The urethra was patent.  Upon reaching the prostatic urethra, prior TUR defect noted, patent prostatic urethra as well as bladder neck.  Pan cystoscopy was then performed in standard 360 degree manner.  No bladder mucosal abnormalities  noted.  Bilateral orthotopic ureters appreciated.  After thorough inspection, the cystoscope was removed.  18 Spanish Carter catheter was inserted without difficulty; 10 cc placed in the balloon and catheter secured.  This concluded this portion of the procedure.    He was then placed in the supine position, reprepped and draped  in the usual standard sterile manner. A midline scrotal incision was made through the skin, subcutaneous tissue, dartos layer, and the tunica vaginalis. The right testicle was then delivered through the scrotal incision. The cystic structure was then identified as cluster of epididymal cysts, right cord lipoma was also noted.  The cluster of cyst was then attempted to be dissected free of the epididymis noted to be densely adherent and intimately connected. Dissection was completed carefully avoiding the spermatic cord structures and the testicle.  The testis and cord structures were palpably normal.  Epididymal cyst excision required dissection and removal of the right epididymis.  The stricture was ligated with suture ties with cysts intact.  The specimen was sent to pathology intact for review. The testicular appendix and epididymal appendix were both coagulated incidentally to avoid future torsion.  Position of the cord lipoma was performed.  The testis appeared healthy and viable.  The scrotal contents were inspected and noted to be normal. All bleeders were coagulated.  The testis was placed back into the tunica vaginalis and appropriate orientation and a two-layer closure was performed with 3-0 chromic sutures. The skin was closed with interrupted mattress sutures.  A cord block and incisional block with 2% Marcaine was performed.  The incision was dressed with bacitracin, Telfa, fluffs, and jockstrap.  Patient was then awakened from anesthesia without complications and transferred to the Recovery Room (RR). The patient arrived to the RR in stable condition and without complications.                 Signed:  Rupinder Hankins MD  10/18/24  13:30 EDT

## 2024-10-18 NOTE — ANESTHESIA POSTPROCEDURE EVALUATION
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        Post Anesthesia Care and Evaluation    Patient location during evaluation: bedside  Patient participation: complete - patient participated  Level of consciousness: awake  Pain management: adequate    Airway patency: patent  PONV Status: none  Cardiovascular status: acceptable and stable  Respiratory status: acceptable  Hydration status: acceptable    Pt c/o \"pins and needles\" feeling left arm.  Denies CP.  EKG unchanged from prior.  Normal heart cath earlier this year.  Pt was lying on Left side in PACU during emergence.  Has improved some. Suspect arm discomfort likely positional, vs cardiac.      CATH 2/2024  Clinical Presentation:  Mr. Olson, presented to the hospital because of sudden onset of chest pain at 10 PM last night.  There was radiation of pain to the left shoulder.  On presentation to the ER, EKGs were at baseline.  Multiple cardiac markers have been negative.  With working diagnosis of unstable angina, he was started on nitroglycerin and heparin infusion.  He continued to have significant chest " pain, hence brought to the Cath Lab to identify ischemic culprits.       Conclusions:  Normal epicardial coronary anatomy with no angiographically significant stenosis noted in any of the arteries.  Mildly elevated left ventricular end-diastolic pressure.  Normal left ventricular systolic function with estimated LV ejection fraction of 55 to 60%.  There are no culprit lesions identified to explain patient's symptoms.

## 2024-10-19 ENCOUNTER — READMISSION MANAGEMENT (OUTPATIENT)
Dept: CALL CENTER | Facility: HOSPITAL | Age: 74
End: 2024-10-19
Payer: MEDICARE

## 2024-10-19 VITALS
SYSTOLIC BLOOD PRESSURE: 121 MMHG | WEIGHT: 161.6 LBS | BODY MASS INDEX: 27.59 KG/M2 | HEART RATE: 79 BPM | HEIGHT: 64 IN | RESPIRATION RATE: 18 BRPM | OXYGEN SATURATION: 93 % | DIASTOLIC BLOOD PRESSURE: 52 MMHG | TEMPERATURE: 98.1 F

## 2024-10-19 LAB
ANION GAP SERPL CALCULATED.3IONS-SCNC: 11.7 MMOL/L (ref 5–15)
BUN SERPL-MCNC: 18 MG/DL (ref 8–23)
BUN/CREAT SERPL: 17.5 (ref 7–25)
CALCIUM SPEC-SCNC: 9.9 MG/DL (ref 8.6–10.5)
CHLORIDE SERPL-SCNC: 105 MMOL/L (ref 98–107)
CO2 SERPL-SCNC: 24.3 MMOL/L (ref 22–29)
CREAT SERPL-MCNC: 1.03 MG/DL (ref 0.76–1.27)
DEPRECATED RDW RBC AUTO: 41.5 FL (ref 37–54)
EGFRCR SERPLBLD CKD-EPI 2021: 76.7 ML/MIN/1.73
ERYTHROCYTE [DISTWIDTH] IN BLOOD BY AUTOMATED COUNT: 13.2 % (ref 12.3–15.4)
GEN 5 2HR TROPONIN T REFLEX: 26 NG/L
GLUCOSE BLDC GLUCOMTR-MCNC: 273 MG/DL (ref 70–99)
GLUCOSE SERPL-MCNC: 195 MG/DL (ref 65–99)
HCT VFR BLD AUTO: 38.6 % (ref 37.5–51)
HGB BLD-MCNC: 12.9 G/DL (ref 13–17.7)
MAGNESIUM SERPL-MCNC: 2 MG/DL (ref 1.6–2.4)
MCH RBC QN AUTO: 29 PG (ref 26.6–33)
MCHC RBC AUTO-ENTMCNC: 33.4 G/DL (ref 31.5–35.7)
MCV RBC AUTO: 86.7 FL (ref 79–97)
NT-PROBNP SERPL-MCNC: 57.7 PG/ML (ref 0–900)
PLATELET # BLD AUTO: 309 10*3/MM3 (ref 140–450)
PMV BLD AUTO: 9.3 FL (ref 6–12)
POTASSIUM SERPL-SCNC: 4.3 MMOL/L (ref 3.5–5.2)
QT INTERVAL: 384 MS
QT INTERVAL: 393 MS
QTC INTERVAL: 429 MS
QTC INTERVAL: 459 MS
RBC # BLD AUTO: 4.45 10*6/MM3 (ref 4.14–5.8)
SODIUM SERPL-SCNC: 141 MMOL/L (ref 136–145)
TROPONIN T DELTA: 1 NG/L
WBC NRBC COR # BLD AUTO: 16.37 10*3/MM3 (ref 3.4–10.8)
WHOLE BLOOD HOLD SPECIMEN: NORMAL

## 2024-10-19 PROCEDURE — 94618 PULMONARY STRESS TESTING: CPT

## 2024-10-19 PROCEDURE — 83880 ASSAY OF NATRIURETIC PEPTIDE: CPT | Performed by: INTERNAL MEDICINE

## 2024-10-19 PROCEDURE — 99024 POSTOP FOLLOW-UP VISIT: CPT | Performed by: UROLOGY

## 2024-10-19 PROCEDURE — 25010000002 METHYLPREDNISOLONE PER 40 MG: Performed by: FAMILY MEDICINE

## 2024-10-19 PROCEDURE — 84484 ASSAY OF TROPONIN QUANT: CPT | Performed by: FAMILY MEDICINE

## 2024-10-19 PROCEDURE — 99239 HOSP IP/OBS DSCHRG MGMT >30: CPT | Performed by: INTERNAL MEDICINE

## 2024-10-19 PROCEDURE — 25010000002 MORPHINE PER 10 MG: Performed by: FAMILY MEDICINE

## 2024-10-19 PROCEDURE — 83735 ASSAY OF MAGNESIUM: CPT | Performed by: INTERNAL MEDICINE

## 2024-10-19 PROCEDURE — 25010000002 ENOXAPARIN PER 10 MG: Performed by: FAMILY MEDICINE

## 2024-10-19 PROCEDURE — 82948 REAGENT STRIP/BLOOD GLUCOSE: CPT | Performed by: INTERNAL MEDICINE

## 2024-10-19 PROCEDURE — 80048 BASIC METABOLIC PNL TOTAL CA: CPT | Performed by: INTERNAL MEDICINE

## 2024-10-19 PROCEDURE — 93010 ELECTROCARDIOGRAM REPORT: CPT | Performed by: INTERNAL MEDICINE

## 2024-10-19 PROCEDURE — 93005 ELECTROCARDIOGRAM TRACING: CPT | Performed by: FAMILY MEDICINE

## 2024-10-19 PROCEDURE — 85027 COMPLETE CBC AUTOMATED: CPT | Performed by: INTERNAL MEDICINE

## 2024-10-19 RX ORDER — ASPIRIN 81 MG/1
81 TABLET ORAL DAILY
Status: DISCONTINUED | OUTPATIENT
Start: 2024-10-19 | End: 2024-10-19 | Stop reason: HOSPADM

## 2024-10-19 RX ORDER — ASPIRIN 81 MG/1
81 TABLET ORAL DAILY
Start: 2024-10-20

## 2024-10-19 RX ORDER — CLOPIDOGREL BISULFATE 75 MG/1
75 TABLET ORAL DAILY
Start: 2024-10-20

## 2024-10-19 RX ORDER — IBUPROFEN 600 MG/1
1 TABLET ORAL
Status: DISCONTINUED | OUTPATIENT
Start: 2024-10-19 | End: 2024-10-19 | Stop reason: HOSPADM

## 2024-10-19 RX ORDER — ATORVASTATIN CALCIUM 40 MG/1
80 TABLET, FILM COATED ORAL NIGHTLY
Status: DISCONTINUED | OUTPATIENT
Start: 2024-10-19 | End: 2024-10-19 | Stop reason: HOSPADM

## 2024-10-19 RX ORDER — CLOPIDOGREL BISULFATE 75 MG/1
75 TABLET ORAL DAILY
Status: DISCONTINUED | OUTPATIENT
Start: 2024-10-19 | End: 2024-10-19

## 2024-10-19 RX ORDER — INSULIN LISPRO 100 [IU]/ML
2-7 INJECTION, SOLUTION INTRAVENOUS; SUBCUTANEOUS
Status: DISCONTINUED | OUTPATIENT
Start: 2024-10-19 | End: 2024-10-19 | Stop reason: HOSPADM

## 2024-10-19 RX ORDER — METHYLPREDNISOLONE SODIUM SUCCINATE 40 MG/ML
40 INJECTION, POWDER, LYOPHILIZED, FOR SOLUTION INTRAMUSCULAR; INTRAVENOUS NIGHTLY
Status: DISCONTINUED | OUTPATIENT
Start: 2024-10-19 | End: 2024-10-19 | Stop reason: HOSPADM

## 2024-10-19 RX ORDER — NICOTINE POLACRILEX 4 MG
15 LOZENGE BUCCAL
Status: DISCONTINUED | OUTPATIENT
Start: 2024-10-19 | End: 2024-10-19 | Stop reason: HOSPADM

## 2024-10-19 RX ORDER — BACLOFEN 10 MG/1
10 TABLET ORAL 3 TIMES DAILY
Status: DISCONTINUED | OUTPATIENT
Start: 2024-10-19 | End: 2024-10-19 | Stop reason: HOSPADM

## 2024-10-19 RX ORDER — IPRATROPIUM BROMIDE AND ALBUTEROL SULFATE 2.5; .5 MG/3ML; MG/3ML
3 SOLUTION RESPIRATORY (INHALATION) 2 TIMES DAILY
Status: DISCONTINUED | OUTPATIENT
Start: 2024-10-19 | End: 2024-10-19

## 2024-10-19 RX ORDER — IPRATROPIUM BROMIDE AND ALBUTEROL SULFATE 2.5; .5 MG/3ML; MG/3ML
3 SOLUTION RESPIRATORY (INHALATION)
Status: DISCONTINUED | OUTPATIENT
Start: 2024-10-19 | End: 2024-10-19 | Stop reason: HOSPADM

## 2024-10-19 RX ORDER — DIAPER,BRIEF,INFANT-TODD,DISP
1 EACH MISCELLANEOUS EVERY 12 HOURS SCHEDULED
Status: DISCONTINUED | OUTPATIENT
Start: 2024-10-19 | End: 2024-10-19 | Stop reason: HOSPADM

## 2024-10-19 RX ORDER — DEXTROSE MONOHYDRATE 25 G/50ML
25 INJECTION, SOLUTION INTRAVENOUS
Status: DISCONTINUED | OUTPATIENT
Start: 2024-10-19 | End: 2024-10-19 | Stop reason: HOSPADM

## 2024-10-19 RX ORDER — TRAMADOL HYDROCHLORIDE 50 MG/1
50 TABLET ORAL EVERY 8 HOURS PRN
Status: DISCONTINUED | OUTPATIENT
Start: 2024-10-19 | End: 2024-10-19 | Stop reason: HOSPADM

## 2024-10-19 RX ORDER — GABAPENTIN 400 MG/1
800 CAPSULE ORAL EVERY 8 HOURS SCHEDULED
Status: DISCONTINUED | OUTPATIENT
Start: 2024-10-19 | End: 2024-10-19 | Stop reason: HOSPADM

## 2024-10-19 RX ADMIN — Medication 10 ML: at 09:01

## 2024-10-19 RX ADMIN — GABAPENTIN 800 MG: 400 CAPSULE ORAL at 08:58

## 2024-10-19 RX ADMIN — TRAMADOL HYDROCHLORIDE 50 MG: 50 TABLET, COATED ORAL at 12:09

## 2024-10-19 RX ADMIN — ASPIRIN 81 MG: 81 TABLET, COATED ORAL at 12:09

## 2024-10-19 RX ADMIN — METHYLPREDNISOLONE SODIUM SUCCINATE 40 MG: 40 INJECTION, POWDER, FOR SOLUTION INTRAMUSCULAR; INTRAVENOUS at 01:35

## 2024-10-19 RX ADMIN — ATORVASTATIN CALCIUM 80 MG: 40 TABLET, FILM COATED ORAL at 01:35

## 2024-10-19 RX ADMIN — PANTOPRAZOLE 20 MG: 20 TABLET, DELAYED RELEASE ORAL at 08:57

## 2024-10-19 RX ADMIN — BACLOFEN 10 MG: 10 TABLET ORAL at 08:57

## 2024-10-19 RX ADMIN — MORPHINE SULFATE 2 MG: 2 INJECTION, SOLUTION INTRAMUSCULAR; INTRAVENOUS at 03:09

## 2024-10-19 RX ADMIN — MORPHINE SULFATE 2 MG: 2 INJECTION, SOLUTION INTRAMUSCULAR; INTRAVENOUS at 07:29

## 2024-10-19 RX ADMIN — BACITRACIN 0.9 G: 500 OINTMENT TOPICAL at 12:09

## 2024-10-19 RX ADMIN — ENOXAPARIN SODIUM 40 MG: 100 INJECTION SUBCUTANEOUS at 08:57

## 2024-10-19 NOTE — PLAN OF CARE
Goal Outcome Evaluation:  Plan of Care Reviewed With: patient        Progress: no change  Outcome Evaluation: C/o pain treated per MAR. Carter remains in place with clear yellow urine output. 4LNC. No acute changes overnight.

## 2024-10-19 NOTE — PROGRESS NOTES
[facility]   Urology Progress Note    Patient Name: Atilio Olson  : 1950  MRN: [id number]  Primary Care Physician:  Shay Vora MD  Date of admission: 10/18/2024    Subjective   Subjective     Admitted postop for hypoxia; patient states he is feeling much better; notes pain controlled; catheter in place      Objective   Objective     Vitals:   Temp:  [97.2 °F (36.2 °C)-98.2 °F (36.8 °C)] 97.3 °F (36.3 °C)  Heart Rate:  [] 80  Resp:  [14-18] 18  BP: ()/() 106/58  Flow (L/min) (Oxygen Therapy):  [4-6] 4.5  Physical Exam     Alert and oriented x3  No acute distress  O2 via nasal cannula  Scrotum with mild ecchymoses on right side; no significant edema; incision clean, dry, intact; no active drainage but gauze soiled with serosanguineous; catheter in place draining clear; no significant bloody drainage around catheter       Result Review    Result Review:  I have personally reviewed the results from the time of this admission to 10/19/2024 07:01 EDT and agree with these findings:  [x]  Laboratory  []  Microbiology  []  Radiology  []  EKG/Telemetry   []  Cardiology/Vascular   []  Pathology  []  Old records  []  Other:      Assessment & Plan   Assessment / Plan     Brief Patient Summary:  Atilio Olson is a 74 y.o. 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 urethral stricture     Pain in both testicles     BPH (benign prostatic hyperplasia)     Arthritis     Diabetes     Hypertension      Plan:   Labs reviewed; WBC 16.37, likely reactive leukocytosis; creatinine 1.03    Maintain scrotal support; RN to change dressing when soiled; apply bacitracin twice daily    Maintain Carter catheter; patient to discharge home with catheter    Okay to resume aspirin;  discussed with hospitalist service    Appreciate hospitalist service assisting with medical management; will be okay for discharge from urology standpoint once medically appropriate      Will follow    Electronically signed by Rupinder Hankins MD, 10/19/24, 7:01 AM EDT.

## 2024-10-19 NOTE — PROGRESS NOTES
Walking Oximetry Progress Note      Patient Name:  Atilio Olson  YOB: 1950  Date of Procedure: 10/19/24              ROOM AIR BASELINE   SpO2% 95   Heart Rate 81     EXERCISE ON ROOM AIR SpO2% EXERCISE ON O2 LPM SpO2%   1 MINUTE 95 1 MINUTE     2 MINUTES 94 2 MINUTES     3 MINUTES 94 3 MINUTES     4 MINUTES 94 4 MINUTES     5 MINUTES 93 5 MINUTES     6 MINUTES 94 6 MINUTES                Time to Recovery  na   SpO2% Post Exercise  96 on ra.    HR Post Exercise  84     Comments: pt does not need o2          Electronically signed by Libia Palma, RRT, 10/19/24, 10:40 AM EDT.

## 2024-10-19 NOTE — DISCHARGE SUMMARY
[facility]         HOSPITALIST  DISCHARGE SUMMARY    Patient Name: Atilio Olson  : 1950  MRN: [id number]    Date of Admission: 10/18/2024  Date of Discharge:  10/19/2024  Primary Care Physician: Shay Vora MD    Consults:  Urology    Active and Resolved Hospital Problems:  Postprocedural hypoxia  History of hypertension  History of diabetes  Epididymal cyst  Urethral stricture  Pain in both testicles  History of BPH      Hospital Course     Hospital Course:  Atilio Olson is a 74 y.o. male with past medical history of hypertension, diabetes, hyperlipidemia, prior CVA, CHF, BPH, arthritis, and GERD presented facility for a scheduled cystoscopy with urethral dilatation of the right epididymectomy but became hypoxic post procedure.  Patient tolerated procedure well but while in the PACU he became hypoxic and unable to saturate above 90% on cannula and thus simple oxygen mask was placed.  Patient did complain of painful inspiration, anxiety, shortness of breath, cough and chest tightness.  Patient saturated well with simple facemask on 5 L with remaining vitals being within normal limits.  Labs did show a slightly elevated troponin with flat delta, hypoxemia on ABG, and hyperglycemia but were relatively unremarkable given chronic condition including normal WBC.  Given his history of DVT a CTA was done which was negative for any PE, aortic dissection, or aneurysm.  No pulmonary infiltrates were seen.  Findings consistent with CAD and pulmonary arterial hypertension were noted.  Patient had cardiac cath in February which was unremarkable.  EKG showed sinus rhythm with RBBB and inverted T waves.  When seen patient was feeling much better and was able to saturate well on simple nasal cannula and denied any chest tightness or pain.  He also denied any recent fevers, chills, headaches, focal weakness, abdominal pain, nausea, vomiting, diarrhea, constipation, dysuria, hematuria,  hematochezia, melena, or anxiety.  Patient was admitted for continued monitoring of hypoxia.  Following morning patient was weaned down to 3 L nasal cannula.  A walk test was performed, on exertion patient was able to walk 6 minutes without any supplemental oxygen needs.  Patient feeling better on the morning of the 19th.  Lungs examined and clear to auscultation.  Patient hoping to discharge home.  Coordinated with urology.  Patient will discharge home with close follow-up as an outpatient with urology clinic.  Patient will resume his aspirin and Plavix as an outpatient.  Patient seen on date of discharge, clinically and hemodynamically stable.  Patient provided concerning signs and symptoms prompting immediate medical attention, patient understanding and agreeable    DISCHARGE Follow Up Recommendations for labs and diagnostics:   Follow-up with primary care physician soon as possible  Follow-up with urology as scheduled      Day of Discharge     Vital Signs:  Temp:  [97.3 °F (36.3 °C)-98.2 °F (36.8 °C)] 98.1 °F (36.7 °C)  Heart Rate:  [] 79  Resp:  [14-18] 18  BP: (101-161)/() 121/52  Flow (L/min) (Oxygen Therapy):  [3.5-6] 3.5  Physical Exam:               Constitutional: Awake, alert, sitting up in bed resting comfortably              Eyes: PERRLA, sclerae anicteric, no conjunctival injection              HENT: NCAT, mucous membranes moist              Neck: Supple, no thyromegaly, no lymphadenopathy, trachea midline              Respiratory: Clear to auscultation bilaterally, nonlabored respirations               Cardiovascular: RRR, no murmurs, rubs, or gallops, palpable pedal pulses bilaterally              Gastrointestinal: Positive bowel sounds, soft, nontender, nondistended              Musculoskeletal: No bilateral ankle edema, no clubbing or cyanosis to extremities              Psychiatric: Appropriate affect, cooperative              Neurologic: Oriented x 3, strength symmetric in all  extremities, Cranial Nerves grossly intact to confrontation, speech clear              Skin: No rashes        Discharge Details        Discharge Medications        New Medications        Instructions Start Date   bacitracin-polymyxin b 500-05804 UNIT/GM ointment ointment   Apply to affected area 2-3 times daily      naloxone 4 MG/0.1ML nasal spray  Commonly known as: NARCAN   Call 911. Don't prime. [place] in 1 nostril for overdose. Repeat in 2-3 minutes in other nostril if no or minimal breathing/responsiveness.      oxybutynin 5 MG tablet  Commonly known as: DITROPAN   5 mg, Oral, 3 Times Daily PRN, May cause constipation      oxyCODONE-acetaminophen 5-325 MG per tablet  Commonly known as: Percocet   Take 1/2 to 2 tablets by mouth every 4-8 hours as needed for severe pain             Continue These Medications        Instructions Start Date   aspirin 81 MG EC tablet   81 mg, Oral, Daily, If urine clearing and not passing excessive clot   Start Date: October 20, 2024     atorvastatin 80 MG tablet  Commonly known as: LIPITOR   80 mg, Oral, Nightly      baclofen 10 MG tablet  Commonly known as: LIORESAL   10 mg, 3 Times Daily      clopidogrel 75 MG tablet  Commonly known as: PLAVIX   75 mg, Oral, Daily, If urine clearing and not passing excessive clot   Start Date: October 20, 2024     FOLBEE PLUS PO   1 tablet, Daily      gabapentin 800 MG tablet  Commonly known as: NEURONTIN   800 mg, 3 Times Daily      Loratadine 10 MG capsule   10 mg, Daily      losartan-HCTZ (HYZAAR) 100-12.5 combo dose   1 dose, Daily      metFORMIN 500 MG tablet  Commonly known as: GLUCOPHAGE   Take 1 tablet by mouth 2 (Two) Times a Day With Meals.      multivitamin with minerals tablet tablet   1 tablet, Daily      pantoprazole 20 MG EC tablet  Commonly known as: PROTONIX   20 mg, Daily      temazepam 15 MG capsule  Commonly known as: RESTORIL   15 mg, Oral, Nightly PRN      traMADol 50 MG tablet  Commonly known as: ULTRAM   50 mg, Every 8  Hours PRN               No Known Allergies    Discharge Disposition:  Home or Self Care    Diet:  Hospital:  Diet Order   Procedures    Diet: Cardiac, Diabetic; Healthy Heart (2-3 Na+); Consistent Carbohydrate; Fluid Consistency: Thin (IDDSI 0)       Discharge Activity:   Activity Instructions       Activity as Tolerated      Discharge Activity      Do not drive or return to work while requiring narcotic pain medication.    No strenuous activity until follow-up.  No lifting over 25 pounds.  Walking and stairs okay.     Do NOT take over 4000 mg (4 g) of Tylenol (acetaminophen) in a 24-hour period.  The narcotic pain medication has 325 mg/tab.    May take ibuprofen as needed for additional discomfort.       Okay to shower in 24 hours; no soaking in water or tub baths. Wash with mild soap and water, pat incisions to dry.    Bruising, swelling, tenderness, redness is expected. Slight drainage from incision and scrotal swelling or possible.    Keep incision clean and dry.     Apply bacitracin ointment to incision twice daily daily.  May also placed at tip of penis at place of catheter insertion.  Bloody drainage around catheter as expected.  Change gauze as needed.     Pain medication as well as anesthesia can cause constipation.  Take over the counter stool softener if constipated.     Recommend cold therapy for the first 24 hours-apply ice to scrotal area, not directly on skin, 20 minutes on and 20 minutes off.     Maintain scrotal support for 48 hours at all times and then as desired for comfort.  Recommend elevation of scrotum when laying down in order to decrease swelling.     Call office if concern that incision is infected.     Call urology office with any questions or concerns.    Patient to maintain catheter to bag drainage until follow up with urology. OK to shower with catheter in place; may disonnect from bag and allow to drain in shower and then reconnect. Keep around catheter clean and dry; may apply small  amount of vaseline at tip of penis as needed for comfort. Empty catheter bag as needed. May use bedside drainage bag or leg bad as needed for comfort.   Be sure that catheter tubing is not pulled on or become kinked and that catheter is permitted to drain at all times.      -Bladder spasms are possible after the procedure; take oxybutynin medication as needed for bladder spasm     - Remove catheter (as instructed prior to discharge by cutting above colored plastic balloon port, allow release of water from balloon and gently pull, some resistance may be met until catheter is removed)  6-8 hours prior to follow up     - Some blood, sediment, clot in urine is expected after discharge, just ensure that catheter remains draining.      Call urology with any questions or concerns            CODE STATUS:  Code Status and Medical Interventions: CPR (Attempt to Resuscitate); Full Support   Ordered at: 10/18/24 9376     Level Of Support Discussed With:    Patient     Code Status (Patient has no pulse and is not breathing):    CPR (Attempt to Resuscitate)     Medical Interventions (Patient has pulse or is breathing):    Full Support         Future Appointments   Date Time Provider Department Center   10/30/2024  9:00 AM Rupinder Hankins MD [facility]       Additional Instructions for the Follow-ups that You Need to Schedule       Discharge Follow-up with PCP   As directed       Currently Documented PCP:    Shay Vora MD    PCP Phone Number:    [phone]     Follow Up Details: In less than one week        Discharge Follow-up with Specified Provider: Dr. Hankins, patient remove catheter 6-8 hours prior; 2 Weeks   As directed      To: Dr. Hankins, patient remove catheter 6-8 hours prior   Follow Up: 2 Weeks   Follow Up Details: [phone]        Discharge Follow-up with Specified Provider: Dr. Hankins; 2 Weeks   As directed      To: Dr. Hankins   Follow Up: 2 Weeks                Pertinent  and/or Most Recent  Results     PROCEDURES:   Preoperative diagnosis  Urethral stricture; right epididymal cyst, orchialgia     Postoperative diagnosis   urethral stricture, right epididymal cyst, orchialgia     Procedure performed  Cystoscopy urethral dilation  Right epididymectomy with cyst excision     Attending surgeon  Rupinder Hankins MD      Anesthesia  General     EBL  5 mL     Complications  None     Specimen  Right epididymis     Findings  Approximate 12 Israeli distal urethral stricture of the fossa navicularis; dilated without difficulty to 26 Israeli with urethral sounds; 18 Israeli Carter catheter     No other urethral abnormalities; patent prostatic urethra, TUR defect noted; no bladder mucosal abnormalities noted; bilateral orthotopic ureters     Normal-appearing right testis; cord lipoma noted, excised; cluster of right epididymal cyst requiring epididymectomy for removal     Indications  74 y.o. male agreed to undergo the above named procedure after discussion of the alternatives, risks and benefits.   Informed consent was obtained.       Procedure  After informed consent was obtained, anesthesia was administered.  Once patient was adequately anesthetized, he was placed in dorsolithotomy position.  Genitals prepped and draped in normal sterile fashion.  Timeout was performed.  Upon inspection the meatus appeared patent.  However, inspection with the cystoscope revealed tight distal urethral stricture of the fossa navicularis, approximately 12 Israeli.  Keri sounds was then used to sequentially dilate the stricture without issue.  The stricture was dilated up to 26 Israeli without issue.  After completion of dilation the urethra easily accommodated the rigid cystoscope.  There were no other urethral abnormalities noted.  The urethra was patent.  Upon reaching the prostatic urethra, prior TUR defect noted, patent prostatic urethra as well as bladder neck.  Pan cystoscopy was then performed in standard 360 degree manner.   No bladder mucosal abnormalities noted.  Bilateral orthotopic ureters appreciated.  After thorough inspection, the cystoscope was removed.  18 Mont[place]in Carter catheter was inserted without difficulty; 10 cc placed in the balloon and catheter secured.  This concluded this portion of the procedure.     He was then placed in the supine position, reprepped and draped  in the usual standard sterile manner. A midline scrotal incision was made through the skin, subcutaneous tissue, dartos layer, and the tunica vaginalis. The right testicle was then delivered through the scrotal incision. The cystic structure was then identified as cluster of epididymal cysts, right cord lipoma was also noted.  The cluster of cyst was then attempted to be dissected free of the epididymis noted to be densely adherent and intimately connected. Dissection was completed carefully avoiding the spermatic cord structures and the testicle.  The testis and cord structures were palpably normal.  Epididymal cyst excision required dissection and removal of the right epididymis.  The stricture was ligated with suture ties with cysts intact.  The specimen was sent to pathology intact for review. The testicular appendix and epididymal appendix were both coagulated incidentally to avoid future torsion.  Position of the cord lipoma was performed.  The testis appeared healthy and viable.  The scrotal contents were inspected and noted to be normal. All bleeders were coagulated.  The testis was placed back into the tunica vaginalis and appropriate orientation and a two-layer closure was performed with 3-0 chromic sutures. The skin was closed with interrupted mattress sutures.  A cord block and incisional block with 2% Marcaine was performed.  The incision was dressed with bacitracin, Telfa, fluffs, and jockstrap.  Patient was then awakened from anesthesia without complications and transferred to the Recovery Room (RR). The patient arrived to the RR in stable  condition and without complications.                    Signed:  Rupinder Hankins MD  10/18/24  13:30 EDT    LAB RESULTS:      Lab 10/19/24  1015 10/18/24  0832   WBC 16.37* 6.83   HEMOGLOBIN 12.9* 13.3   HEMATOCRIT 38.6 39.7   PLATELETS 309 302   NEUTROS ABS  --  4.60   IMMATURE GRANS (ABS)  --  0.01   LYMPHS ABS  --  1.19   MONOS ABS  --  0.54   EOS ABS  --  0.41*   MCV 86.7 86.5         Lab 10/19/24  1015 10/18/24  0832   SODIUM 141 139   POTASSIUM 4.3 4.4   CHLORIDE 105 105   CO2 24.3 26.1   ANION GAP 11.7 7.9   BUN 18 20   CREATININE 1.03 1.03   EGFR 76.7 76.7   GLUCOSE 195* 166*   CALCIUM 9.9 9.5   MAGNESIUM 2.0  --    HEMOGLOBIN A1C  --  7.10*   TSH  --  1.930             Lab 10/19/24  0041 10/18/24  2220 10/18/24  1859   PROBNP 57.7  --   --    HSTROP T 26* 25* 24*         Lab 10/18/24  0832   CHOLESTEROL 125   LDL CHOL 67   HDL CHOL 32*   TRIGLYCERIDES 149             Lab 10/18/24  1850   PH, ARTERIAL 7.359   PCO2, ARTERIAL 48.3*   PO2 ART 65.1*   O2 SATURATION ART 91.3*   HCO3 ART 27.2*   BASE EXCESS ART 1.1     Brief Urine Lab Results  (Last result in the past 365 days)        Color   Clarity   Blood   Leuk Est   Nitrite   Protein   CREAT   Urine HCG        05/01/24 1002 Dark Yellow   Cloudy   Negative   Trace   Negative   30 mg/dL (1+)                 Microbiology Results (last 10 days)       ** No results found for the last 240 hours. **            CT Chest With Contrast Diagnostic    Result Date: 10/18/2024  Impression: No pulmonary embolism. No thoracic aortic aneurysm or dissection. No acute infiltrate. Please see above comments for further detail.    Portions of this note were completed with a voice recognition program.  Electronically Signed: Guillermo Rdz MD  10/18/2024 10:10 PM EDT  Workstation ID: [id number]    XR Chest 1 View    Result Date: 10/18/2024  Impression: Impression: Low volumes with right basilar atelectasis. No acute process identified. Electronically Signed: Corbin Kimble MD   10/18/2024 5:25 PM EDT  Workstation ID: [id number]             Results for orders placed during the hospital encounter of 02/02/24    Adult Transthoracic Echo Complete W/ Cont if Necessary Per Protocol    Interpretation Summary    Left ventricular systolic function is normal. Left ventricular ejection fraction appears to be 56 - 60%.    Left ventricular diastolic function is consistent with (grade I) impaired relaxation.    The right ventricular cavity is mild to moderately dilated, with a normal systolic function.    There are no significant valvular abnormalities.    Unable to calculate pulmonary artery systolic pressure due to incomplete TR Doppler envelope.      Labs Pending at Discharge:  Pending Labs       Order Current Status    Tissue Pathology Exam Collected (10/18/24 8084)              Time spent on Discharge including face to face service:  36 minutes    Electronically signed by Carlos Manuel Elaine MD, 10/19/24, 4:22 PM EDT.

## 2024-10-19 NOTE — PLAN OF CARE
Goal Outcome Evaluation:           Progress: improving  Outcome Evaluation: Titrated off oxygen, 93% on room air, c/o scrotal pain, morphine given x1, tramadol given x1, effective, up to bathroom, tolerating regular diet, preparing to d/c home with family for self care.

## 2024-10-19 NOTE — H&P
[facility]   HISTORY AND PHYSICAL    Patient Name: Atilio Olson  : 1950  MRN: [id number]  Primary Care Physician:  Shay Vora MD  Date of admission: 10/18/2024    Subjective   Subjective     Chief Complaint: Shortness of breath, hypoxia, chest tightness    HPI:    Atilio Olson is a 74 y.o. male with past medical history of hypertension, diabetes, hyperlipidemia, prior CVA, CHF, BPH, arthritis, and GERD presented facility for a scheduled cystoscopy with urethral dilatation of the right epididymectomy but became hypoxic post procedure.  Patient tolerated procedure well but while in the PACU he became hypoxic and unable to saturate above 90% on cannula and thus simple oxygen mask was placed.  Patient did complain of painful inspiration, anxiety, shortness of breath, cough and chest tightness.  Patient saturated well with simple facemask on 5 L with remaining vitals being within normal limits.  Labs did show a slightly elevated troponin with flat delta, hypoxemia on ABG, and hyperglycemia but were relatively unremarkable given chronic condition including normal WBC.  Given his history of DVT a CTA was done which was negative for any PE, aortic dissection, or aneurysm.  No pulmonary infiltrates were seen.  Findings consistent with CAD and pulmonary arterial hypertension were noted.  Patient had cardiac cath in February which was unremarkable.  EKG showed sinus rhythm with RBBB and inverted T waves.  When seen patient was feeling much better and was able to saturate well on simple nasal cannula and denied any chest tightness or pain.  He also denied any recent fevers, chills, headaches, focal weakness, abdominal pain, nausea, vomiting, diarrhea, constipation, dysuria, hematuria, hematochezia, melena, or anxiety.  Patient admitted for further evaluation and treatment.    Review of Systems   All systems were reviewed and negative except for: As per HPI    Personal History     Past Medical  History:   Diagnosis Date    Abnormal ECG [id number]    R BBB    Acid reflux     Arthritis     Arthritis of back     Bladder disorder     BPH    BPH with urinary obstruction 05/06/2024    Cervical disc disorder     Claustrophobia     Congestive heart failure (CHF)     UNSURE OF LAST EPISODE, NO CURRENT ISSUES, FOLL[facility]    Deep vein thrombosis 2006    right arm, ON THINNER, BLOOD CLOTTING DISORDER, FOLIC ACID DEF.    Diabetes     DOES NOT CHECK BS    Elevated serum creatinine     1.47    Fracture, foot     High cholesterol     Hypertension     CONTROLLED W/ MED    Limb swelling     Low back pain     Lumbosacral disc disease     Neurogenic bladder 01/18/2017    NSTEMI (non-ST elevated myocardial infarction) 02/02/2024    WAS RULED OUT FELT GASTRO IN NATURE    Periarthritis of shoulder     Peripheral neuropathy     Spinal stenosis     Stroke     x2, last episode 2006, left side slight weakness    Tear of meniscus of knee     Tendonitis of shoulder 02/19/2018    ROTATOR CUFF RIGHT        Past Surgical History:   Procedure Laterality Date    BACK SURGERY      THORACIC, DISC REMOVAL NO FUSION    CARDIAC CATHETERIZATION N/A 02/02/2024    Procedure: Left Heart Cath;  Surgeon: Ty Rizvi MD;  Location: [facility] INVASIVE LOC[facility];  Service: Cardiovascular;  Laterality: N/A;    COLONOSCOPY      CYSTOSCOPY      EXCISION      CYST EXCISION    FOOT SURGERY Left     R/T ARTHRITIS, MULTIPLE SCREWS    GALLBLADDER SURGERY      HAND SURGERY      HERNIA REPAIR      JOINT REPLACEMENT      LUMBAR DISCECTOMY Left 03/22/2023    Procedure: MINIMALLY INVASIVE LUMBAR LAMINECTOMY, left approach, lumbar 4-lumbar 5;  Surgeon: Ajit Staley MD;  Location: [facility] OR;  Service: Neurosurgery;  Laterality: Left;    OTHER SURGICAL HISTORY      METAL IMPLANT TOTAL JOINTS AND FOOT    OTHER SURGICAL HISTORY      ARTIFICAL JOINTS/LIMBS    OTHER SURGICAL HISTORY      JOINT SURGERY    PROSTATE AQUABLATION N/A 5/6/2024     Procedure: TRANSURETHRAL PROSTATE AQUABLATION;  Surgeon: Rupinder Hankins MD;  Location: [facility] MAIN OR;  Service: Robotics - Urology;  Laterality: N/A;    REPLACEMENT TOTAL KNEE Left     REPLACEMENT TOTAL KNEE Right     X2    SHOULDER SURGERY Right     ARTHROSCOPY    TONSILLECTOMY      WRIST SURGERY         Family History: family history includes Arthritis in his father and mother; Diabetes in his father, mother, sister, and son; Heart disease in his father and mother; Stroke in his father and sister. Otherwise pertinent FHx was reviewed and not pertinent to current issue.    Social History:  reports that he has been smoking cigarettes. He has a 15 pack-year smoking history. He has never used smokeless tobacco. He reports that he does not currently use alcohol. He reports that he does not use drugs.    Home Medications:  B Complex-C-Folic Acid, Loratadine, aspirin, atorvastatin, bacitracin-polymyxin b, baclofen, clopidogrel, gabapentin, losartan-HCTZ (HYZAAR) 100-12.5 combo dose, metFORMIN, multivitamin with minerals, naloxone, oxyCODONE-acetaminophen, oxybutynin, pantoprazole, temazepam, and traMADol      Allergies:  No Known Allergies    Objective   Objective     Vitals:   Temp:  [97.2 °F (36.2 °C)-98.2 °F (36.8 °C)] 98.2 °F (36.8 °C)  Heart Rate:  [] 103  Resp:  [14-18] 18  BP: ()/() 105/52  Flow (L/min) (Oxygen Therapy):  [4-6] 4.5  Physical Exam    Constitutional: Awake, alert   Eyes: PERRLA, sclerae anicteric, no conjunctival injection   HENT: NCAT, mucous membranes moist   Neck: Supple, no thyromegaly, no lymphadenopathy, trachea midline   Respiratory: Clear to auscultation bilaterally, nonlabored respirations    Cardiovascular: RRR, no murmurs, rubs, or gallops, palpable pedal pulses bilaterally   Gastrointestinal: Positive bowel sounds, soft, nontender, nondistended   Musculoskeletal: No bilateral ankle edema, no clubbing or cyanosis to extremities   Psychiatric: Appropriate affect,  cooperative   Neurologic: Oriented x 3, strength symmetric in all extremities, Cranial Nerves grossly intact to confrontation, speech clear   Skin: No rashes     Result Review    Result Review:  I have personally reviewed the results from the time of this admission to 10/18/2024 21:35 EDT and agree with these findings:  [x]  Laboratory list / accordion  []  Microbiology  [x]  Radiology  [x]  EKG/Telemetry   []  Cardiology/Vascular   []  Pathology  []  Old records  []  Other:  Most notable findings include: Elevated but flat troponin, ABG with hypoxia, hyperglycemia normal TSH normal EKG sinus rhythm RBBB, CTA negative for PE      Assessment & Plan   Assessment / Plan     Brief Patient Summary:  Atilio Olson is a 74 y.o. 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  Likely undiagnosed COPD  -Supplemental oxygen as needed.  Wean down as tolerated  -DuoNebs 3 times daily and as needed  -Solu-Medrol he will  -Empiric antibiotics if warranted.  -Will follow along    Chest tightness  -Hypoxic as above  -Troponins elevated but flat  -EKG reviewed.  Will repeat  -Echo from earlier in the year.  Update as needed  -Patient had cardiac cath in February  -Consult  cardiology if warranted    HTN  -Currently well controlled  -PRN BP meds  -Resume home meds when available  -Titrate if needed    Diabetes  -A1c 7.1  -Outpatient follow-up    CAD    GI ppx  DVT ppx      VTE Prophylaxis:  Pharmacologic VTE prophylaxis orders are present.        CODE STATUS:    Level Of Support Discussed With: Patient  Code Status (Patient has no pulse and is not breathing): CPR (Attempt to Resuscitate)  Medical Interventions (Patient has pulse or is breathing): Full Support    Admission Status:  I believe this patient meets inpatient status.      Electronically signed by Low Aden MD, 10/18/24, 9:35 PM EDT.

## 2024-10-20 NOTE — OUTREACH NOTE
Prep Survey      Flowsheet Row Responses   [facility] facility patient discharged from? [facility]   Is LACE score < 7 ? No   Eligibility Readm Mgmt   Discharge diagnosis Hypoxia   Does the patient have one of the following disease processes/diagnoses(primary or secondary)? Other   Does the patient have Home health ordered? No   Is there a DME ordered? No   Prep survey completed? Yes            BETTE LR - Registered Nurse

## 2024-10-20 NOTE — CASE MANAGEMENT/SOCIAL WORK
"IP:  10/18/24 s/p cystoscopy urethral   dilation & R epididymectomy w/ cyst excision - post op hypoxia   (Met OBS criteria)  DC:  10/19/24  LOS:  < 2MN  Exception: N/A - per MD Summary, \"...Patient   hoping to discharge home. Coordinated with urology. Patient will discharge home with close follow-up as an outpatient with urology clinic.\"  "

## 2024-10-23 ENCOUNTER — READMISSION MANAGEMENT (OUTPATIENT)
Dept: CALL CENTER | Facility: HOSPITAL | Age: 74
End: 2024-10-23
Payer: MEDICARE

## 2024-10-23 NOTE — OUTREACH NOTE
Medical Week 1 Survey      Flowsheet Row Responses   [facility] facility patient discharged from? [facility]   Does the patient have one of the following disease processes/diagnoses(primary or secondary)? Other   Week 1 attempt successful? No   Unsuccessful attempts Attempt 1            Radha [facility] - Registered Nurse

## 2024-10-29 ENCOUNTER — READMISSION MANAGEMENT (OUTPATIENT)
Dept: CALL CENTER | Facility: HOSPITAL | Age: 74
End: 2024-10-29
Payer: MEDICARE

## 2024-10-29 NOTE — OUTREACH NOTE
Medical Week 2 Survey      Flowsheet Row Responses   [facility] patient discharged from? [facility]   Does the patient have one of the following disease processes/diagnoses(primary or secondary)? Other   Week 2 attempt successful? Yes   Call start time 1523   Discharge diagnosis Hypoxia   Call end time 1528   Meds reviewed with patient/caregiver? Yes   Is the patient having any side effects they believe may be caused by any medication additions or changes? No   Does the patient have all medications ordered at discharge? Yes   Is the patient taking all medications as directed (includes completed medication regime)? Yes   Does the patient have a primary care provider?  Yes   Does the patient have an appointment with their PCP within 7 days of discharge? Greater than 7 days   Nursing Interventions Verified appointment date/time/provider  [10/31//24]   Has the patient kept scheduled appointments due by today? Yes   Comments Urology on 10/30/24   Has home health visited the patient within 72 hours of discharge? N/A   Psychosocial issues? No   Did the patient receive a copy of their discharge instructions? Yes   Nursing interventions Reviewed instructions with patient   What is the patient's perception of their health status since discharge? Same  [Pt reports he still has some bleeding noted to sutures, he reports he was told to expect.  He is completed site care BID as advised. Removed cath yesterday per MD instructions,  voiding w/o issues.  Pt reports still having some scrotal edema.]   Is the patient/caregiver able to teach back signs and symptoms related to disease process for when to call PCP? Yes   Is the patient/caregiver able to teach back signs and symptoms related to disease process for when to call 911? Yes   Additional teach back comments Discussed elevation of scrotum for edema relief. Pt reports he hasn't been elevating as he has been trying to get back to normal.   Week 2 Call Completed? Yes   Graduated  Yes  [pt has appts scheduled this week with both uro and PCP]   Call end time [id number]            AMANDA BLANCHARD - Registered Nurse

## 2024-10-30 ENCOUNTER — OFFICE VISIT (OUTPATIENT)
Dept: UROLOGY | Age: 74
End: 2024-10-30
Payer: MEDICARE

## 2024-10-30 VITALS
TEMPERATURE: 98 F | BODY MASS INDEX: 27.15 KG/M2 | DIASTOLIC BLOOD PRESSURE: 71 MMHG | HEART RATE: 79 BPM | WEIGHT: 158.2 LBS | SYSTOLIC BLOOD PRESSURE: 122 MMHG

## 2024-10-30 DIAGNOSIS — N99.115 POSTPROCEDURAL MALE FOSSA NAVICULARIS URETHRAL STRICTURE: ICD-10-CM

## 2024-10-30 DIAGNOSIS — N50.3 EPIDIDYMAL CYST: Primary | ICD-10-CM

## 2024-10-30 LAB — URINE VOLUME: 22

## 2024-10-30 NOTE — PROGRESS NOTES
Physical Therapy Daily Treatment Note   Name: Teresa Clay 1955  MRN: [id number]    Visit Date: 10/28/2019  Visit #: 6/ 18  Authorization period Expiration:    Plan of Care Expiration:1/3/20  Precautions: avoid prolonged flexion     Time In: 900  Time Out: 1000  Total 1:1 Treatment Time: 60 min    Treatment Diagnosis: DDD, fibromyalgia, lumbar radiculitis  Physician: Gail Cohen MD    Subjective   Pt reports:pain   Pain Scale:  2/10 on VAS currently, 0/10 on VAS post treatment  Pain Location: low back,r hip    Objective   Fabiola received therapeutic exercises to develop strength, endurance, ROM, flexibility, posture and core stabilization for 35  minutes including:     Single knee to chest stretch 3 x 30  Double knee to chest stretch 3 x 30  aProne on elbows x 10   Alternating contralateral UE/LE ext In prone x5  Stretch to piriformisx 3   Stretch to HS wx3  Standing lumbar ext 2 x 10  Prone  ext 2 x 10  Grade 1 and 2  Post mobs on ant iliium x 5  N. Flossing sunitha le x 10  MET to L hip flex/R hip ext 5 x 20 sec hold  Stretch to ITB sunitha 3 x 15 sec    NOT PERFORMED THIS SESSION  Stretch hip flexor w strap,3 x 15-20 sec hold  abd bracing  abd bracing w pelvic tilt      NOT PERFORMED THIS SESSION  Fabiola received the following manual therapy techniques: Myofacial release were applied to the prififormis r and l  F/b deep tissue massage  x 25 min    Home Exercises and Education Providedp     Education provided re: discussed the benefits of yoga and the type of modifications we ll have to incorporate.  Pt given handout on type of breathing used with vinyasa flow yoga (Ujjayi)  - progress towards goals   - role of therapy in multi - disciplinary team, goals for therapy  Pt educated on condition, POC, and expectations in therapy.  No spiritual or educational barriers to learning provided    Home exercises:handout pg 2,3, see media section  Pt will be provided HEP during course of      UROLOGY OFFICE FOLLOW UP NOTE    Subjective   HPI  Atilio Olson is a 74 y.o. male.  Presents for postop follow-up cystoscopy with urethral dilation, right epididymectomy with cyst excision , 10/18/2024.  History of Present Illness  He reports no current issues with urination and states that he is able to urinate normally. He removed his catheter on Monday night due to discomfort.    He mentions some seepage from his scrotum incision last night but has not observed any this morning. He has not noticed the presence of pus, but notes some blood. He is applying an ointment twice daily and reports no pain, except for slight discomfort in his right testicle. He has not experienced any pressure sensation or pain since his surgery. He is making efforts to keep the area clean but notes the presence of blood upon waking.          ________________  Epididymis pathology: - Benign epididymis and epididymal cysts     scrotal ultrasound 9/9/2024: Cystic replacement of the epididymal head on the right   with the largest of the cyst measuring 3.1 cm maximally. Bilateral   hydroceles, slightly more complex on the right than left. No   intratesticular abnormalities.    _____________  Prostate pathology, aqua ablation 5/6/2024: Benign prostatic hyperplasia    ______________  Uroflow performed, consistent with obstructive uropathy, flow rate max 5 mL/s.     IPSS performed reveals severe bother score of 29.     CT abdomen pelvis with contrast 2/15/2024 performed prior to this visit.  Stable left rigid lip adenoma; right renal cyst, enlarged prostate gland; no urinary bladder thickening.  Fat-containing left inguinal hernia.  Prostate gland measured and calculated out to 110 g approximately.     Office cystoscopy 2/27/2024: Severe prostatomegaly with bilateral coapting lateral lobes, some varicosities, elongated prostatic urethra; no median lobe or significant intravesical prostate tissue.  Cystoscopy revealed normal capacity  bladder which was decompressed upon entry, one right and left ureteral orifice in the normal anatomic position, normal bladder mucosa and no tumors, masses or stones.      Review of systems  A review of systems was performed, and positive findings are noted in the HPI.    Objective     Vital Signs:   /71   Pulse 79   Temp 98 °F (36.7 °C)   Wt 71.8 kg (158 lb 3.2 oz)   BMI 27.15 kg/m²       Physical exam  No acute distress, well-nourished  Awake alert and oriented  Mood normal; affect normal  : Patent meatus; some thickening of the urethral tissues of the fossa navicularis; mild scrotal edema; no significant erythema or ecchymoses; incision intact; no expressible drainage some serosanguineous drainage on gauze; underlying inflammatory tissues right hemiscrotum with hemiscrotal swelling; tolerant to exam  Physical Exam        Bladder Scan interpretation 10/30/2024    Estimation of residual urine via BVI 3000 [place] Bladder Scan  Performed by: CLAIRE Alvarenga  Residual Urine: 22ml  Indication: Epididymal cyst    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   Findings: An anechoic area was demonstrated which represented the bladder, with measurement of residual urine as noted. I inspected this myself. In that the residual urine was stable or insignificant, refer to plan for treatment and plan necessary at this time.     Problem List:  Patient Active Problem List   Diagnosis    Primary osteoarthritis of right hip    History of right knee joint replacement    Esophageal reflux    Arthritis    Bladder disorder    Chest pain    Congestive heart failure    Diabetes    Disorder of bursae and tendons in shoulder region    High cholesterol    Hypertension    Limb swelling    Stroke    Benign prostatic hyperplasia with weak urinary stream    BPH with urinary obstruction     treatment with progressions as appropriate. Pt was advised to perform these exercises free of pain, and to stop performing them if pain occurs.   Fabiola demonstrated good  understanding of the education provided.     Assessment   Pain did inc in middle of session likely due to flexion stretches and nustep.  Was able to reduce pain with prone ext with pillow under low abd then pillow removed f/ ext. Also improve SI alighnment    AVOID FLEXION EXES/STRETCHES AND NUSTEP    Pt prognosis is Excellent. Pt will continue to benefit from skilled outpatient physical therapy to address the deficits listed in the problem list chart on initial evaluation, provide pt/family education and to maximize pt's level of independence in the home and community environment.     Medical necessity is demonstrated by the impairments and functional limitations listed on the Initial Evaluation.     Anticipated barriers to physical therapy: none  Pt's spiritual, cultural and educational needs considered and pt agreeable to plan of care and goals.    Goals   Progressing not met  In 3 weeks:  1.  Pt will decrease pain from 5/ 10 to 2/10 w reduction of radicular sx.  2. Pt will improve strength to improve tolerance and compliance with careplan.   3. Pt will be educated in pain management,  posture, safety techniques and HEP.     Long Term GOALS:6-8 weeks  1. Pt will have 0-1/10 pain allowing pt to tolerate working a full day.   2. Pt will improve Index score to 20% or less.   3.   Pt will be independent with HEP and SX management       Plan   Continue with established Plan of Care towards Physical Therapy goals. Progress with HEP net session.   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 Epididymal cyst (Primary)  -     Bladder Scan    2. 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    Recommend intermittent catheterization of his glans to maintain patency.  He is agreeable with this.  Recommend passing 14 Hungarian catheter at the meatus 1-2 times weekly.  Supplies provided.  Assessment & Plan    Status post right epididymectomy-healing well; no evidence of infection  He is advised to continue applying bacitracin once daily and otherwise to allow the incision to dry out.   Continue activity restrictions   the use of a jockstrap is recommended to keep the area elevated, but it is optional based on comfort.    Follow-up  Return in 2 to 3 weeks for follow up, PVR       All questions addressed      Patient or patient representative verbalized consent for the use of Ambient Listening during the visit with  Rupinder Hankins MD for chart documentation. 10/31/2024  06:29 EDT

## 2024-11-20 NOTE — PROGRESS NOTES
UROLOGY OFFICE FOLLOW UP NOTE    Subjective   HPI  Atilio Olson is a 74 y.o. male.  Presents for postop follow-up cystoscopy with urethral dilation, right epididymectomy with cyst excision , 10/18/2024.   Taught how to perform intermittent catheterization of his glans to maintain patency at last visit.  Presents for repeat PVR.    History of Present Illness  He reports an improvement in his urinary symptoms. However, he experiences a sensation akin to being kicked in the right side of his scrotum.   He is not currently taking any medication for his prostate.     He performs self-catheterization just of the glans approximately once every 10 days, which he finds painful. He has an adequate supply of catheters at home.        ________________  Epididymis pathology: - Benign epididymis and epididymal cysts      scrotal ultrasound 9/9/2024: Cystic replacement of the epididymal head on the right   with the largest of the cyst measuring 3.1 cm maximally. Bilateral   hydroceles, slightly more complex on the right than left. No   intratesticular abnormalities.    _____________  Prostate pathology, aqua ablation 5/6/2024: Benign prostatic hyperplasia     ______________  Uroflow performed, consistent with obstructive uropathy, flow rate max 5 mL/s.     IPSS performed reveals severe bother score of 29.     CT abdomen pelvis with contrast 2/15/2024 performed prior to this visit.  Stable left rigid lip adenoma; right renal cyst, enlarged prostate gland; no urinary bladder thickening.  Fat-containing left inguinal hernia.  Prostate gland measured and calculated out to 110 g approximately.     Office cystoscopy 2/27/2024: Severe prostatomegaly with bilateral coapting lateral lobes, some varicosities, elongated prostatic urethra; no median lobe or significant intravesical prostate tissue.  Cystoscopy revealed normal capacity bladder which was decompressed upon entry, one right and left ureteral orifice in the normal anatomic  "position, normal bladder mucosa and no tumors, masses or stones.      Results for orders placed or performed in visit on 11/21/24   Bladder Scan    Collection Time: 11/21/24  8:54 AM   Result Value Ref Range    Urine Volume 14          Review of systems  A review of systems was performed, and positive findings are noted in the HPI.    Objective     Vital Signs:   Resp 18   Ht 162.6 cm (64\")   Wt 71.7 kg (158 lb)   BMI 27.12 kg/m²       Physical exam  No acute distress, well-nourished  Awake alert and oriented  Mood normal; affect normal  : Incision clean, dry, intact, healing well; thickened inflammatory tissues, right hemiscrotum; no erythema or edema; no evidence of infection, normal palpable testis; no left hemiscrotal abnormality    Physical Exam        Bladder Scan interpretation 11/21/2024    Estimation of residual urine via BVI 3000 [place] Bladder Scan  Performed by: CLAIRE Alvarenga  Residual Urine: 14 ml  Indication: 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   Findings: An anechoic area was demonstrated which represented the bladder, with measurement of residual urine as noted. I inspected this myself. In that the residual urine was stable or insignificant, refer to plan for treatment and plan necessary at this time.     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 Epididymal cyst    Other orders  -     Bladder Scan      Overall doing well  Provided reassurance  Assessment & Plan  The patient's scrotal pain is likely due to inflammation, with no signs of infection. The firmness of the tissue is expected to soften over time. The changes in his tissues are consistent with postoperative changes.     He was advised to continue self-catheterization to prevent stricture formation.     Supportive underwear was recommended for comfort.    .  His urinary symptoms have improved.  Emptying bladder with low postvoid residual; continue to monitor.      Follow-up  Return in 6 months, PVR for follow up.       All questions addressed      Patient or patient representative verbalized consent for the use of Ambient Listening during the visit with  Rupinder Hankins MD for chart documentation. 11/22/2024  15:18 EST

## 2024-11-21 ENCOUNTER — OFFICE VISIT (OUTPATIENT)
Dept: UROLOGY | Age: 74
End: 2024-11-21
Payer: MEDICARE

## 2024-11-21 VITALS — HEIGHT: 64 IN | RESPIRATION RATE: 18 BRPM | WEIGHT: 158 LBS | BODY MASS INDEX: 26.98 KG/M2

## 2024-11-21 DIAGNOSIS — N50.3 EPIDIDYMAL CYST: ICD-10-CM

## 2024-11-21 DIAGNOSIS — N99.115 POSTPROCEDURAL MALE FOSSA NAVICULARIS URETHRAL STRICTURE: Primary | ICD-10-CM

## 2024-11-21 LAB — URINE VOLUME: 14

## 2025-02-26 ENCOUNTER — TRANSCRIBE ORDERS (OUTPATIENT)
Dept: ADMINISTRATIVE | Facility: HOSPITAL | Age: 75
End: 2025-02-26
Payer: MEDICARE

## 2025-02-26 DIAGNOSIS — N50.819 PAIN IN TESTICLE, UNSPECIFIED LATERALITY: Primary | ICD-10-CM

## 2025-02-26 DIAGNOSIS — R91.1 LUNG NODULE: ICD-10-CM

## 2025-03-08 ENCOUNTER — HOSPITAL ENCOUNTER (OUTPATIENT)
Dept: ULTRASOUND IMAGING | Facility: HOSPITAL | Age: 75
Discharge: HOME | End: 2025-03-08
Payer: MEDICARE

## 2025-03-08 DIAGNOSIS — N50.819 PAIN IN TESTICLE, UNSPECIFIED LATERALITY: ICD-10-CM

## 2025-03-08 PROCEDURE — 76870 US EXAM SCROTUM: CPT

## 2025-03-12 ENCOUNTER — TELEPHONE (OUTPATIENT)
Dept: UROLOGY | Age: 75
End: 2025-03-12
Payer: MEDICARE

## 2025-03-12 NOTE — TELEPHONE ENCOUNTER
Call made to pt to get on schedule for f/u per Dr. Hankins for bilateral testicular pain; RN offered tomorrow and pt states his mother  last week and unable to make it as her  is tomorrow; RN expressed sympathy and offered 3/18 @ 0845; pt is agreeable; RN to sched pt for 3/18 @ 0845.

## 2025-03-12 NOTE — TELEPHONE ENCOUNTER
Provider: DR YEH    Caller: Atilio Olson    Relationship to Patient: Self    Pharmacy: [facility]    Phone Number: [phone] (home)      Reason for Call: PAIN    When was the patient last seen: 11/21/2024    When did it start: 3 WEEKS AGO    Where is it located: BOTH TESTICLES    Characteristics of symptom/severity: 6 OUT OF 10 PAIN LEVEL BUT DOES GET WORSE WHEN SITTING    Timing- Is it constant or intermittent: CONSTANT    What makes it worse: SITTING    What makes it better: STANDING WITH LEGS APART    What therapies/medications have you tried: NO    PT HAD U/S ON 03/08/2025 THAT WAS ORDERED BY HIS PCP.  PT REQUESTING CALL BACK ASAP TO DISCUSS.

## 2025-03-14 RX ORDER — AMOXICILLIN 500 MG/1
CAPSULE ORAL
COMMUNITY
Start: 2025-03-06

## 2025-03-18 ENCOUNTER — OFFICE VISIT (OUTPATIENT)
Dept: SURGERY | Facility: CLINIC | Age: 75
End: 2025-03-18
Payer: MEDICARE

## 2025-03-18 ENCOUNTER — OFFICE VISIT (OUTPATIENT)
Dept: UROLOGY | Age: 75
End: 2025-03-18
Payer: MEDICARE

## 2025-03-18 VITALS
HEART RATE: 67 BPM | BODY MASS INDEX: 28.38 KG/M2 | DIASTOLIC BLOOD PRESSURE: 74 MMHG | HEIGHT: 64 IN | SYSTOLIC BLOOD PRESSURE: 135 MMHG | WEIGHT: 166.2 LBS

## 2025-03-18 VITALS — HEIGHT: 64 IN | BODY MASS INDEX: 26.98 KG/M2 | WEIGHT: 158 LBS | RESPIRATION RATE: 18 BRPM

## 2025-03-18 DIAGNOSIS — N50.812 PAIN IN LEFT TESTICLE: ICD-10-CM

## 2025-03-18 DIAGNOSIS — N50.3 EPIDIDYMAL CYST: Primary | ICD-10-CM

## 2025-03-18 DIAGNOSIS — K40.90 LEFT INGUINAL HERNIA: Primary | ICD-10-CM

## 2025-03-18 DIAGNOSIS — N45.1 EPIDIDYMITIS: ICD-10-CM

## 2025-03-18 DIAGNOSIS — K40.90 NON-RECURRENT UNILATERAL INGUINAL HERNIA WITHOUT OBSTRUCTION OR GANGRENE: ICD-10-CM

## 2025-03-18 DIAGNOSIS — Z72.0 TOBACCO USE: ICD-10-CM

## 2025-03-18 LAB — URINE VOLUME: 32

## 2025-03-18 PROCEDURE — 1159F MED LIST DOCD IN RCRD: CPT | Performed by: SURGERY

## 2025-03-18 PROCEDURE — 3078F DIAST BP <80 MM HG: CPT | Performed by: SURGERY

## 2025-03-18 PROCEDURE — 1160F RVW MEDS BY RX/DR IN RCRD: CPT | Performed by: SURGERY

## 2025-03-18 PROCEDURE — 3075F SYST BP GE 130 - 139MM HG: CPT | Performed by: SURGERY

## 2025-03-18 PROCEDURE — 99213 OFFICE O/P EST LOW 20 MIN: CPT | Performed by: SURGERY

## 2025-03-18 RX ORDER — SODIUM CHLORIDE 0.9 % (FLUSH) 0.9 %
10 SYRINGE (ML) INJECTION EVERY 12 HOURS SCHEDULED
OUTPATIENT
Start: 2025-03-18

## 2025-03-18 RX ORDER — SODIUM CHLORIDE 0.9 % (FLUSH) 0.9 %
10 SYRINGE (ML) INJECTION AS NEEDED
OUTPATIENT
Start: 2025-03-18

## 2025-03-18 RX ORDER — SODIUM CHLORIDE 9 MG/ML
40 INJECTION, SOLUTION INTRAVENOUS AS NEEDED
OUTPATIENT
Start: 2025-03-18

## 2025-03-18 RX ORDER — DOXYCYCLINE 100 MG/1
100 CAPSULE ORAL 2 TIMES DAILY
Qty: 28 CAPSULE | Refills: 0 | Status: SHIPPED | OUTPATIENT
Start: 2025-03-18 | End: 2025-04-01

## 2025-03-18 RX ORDER — ONDANSETRON 2 MG/ML
4 INJECTION INTRAMUSCULAR; INTRAVENOUS EVERY 6 HOURS PRN
OUTPATIENT
Start: 2025-03-18

## 2025-03-18 NOTE — PROGRESS NOTES
Inpatient History and Physical Surgical Orders    Preadmission Location:   Preadmission Time:  Facility:  Surgery Date:  Surgery Time:  Preadmission Test date:     Chief Complaint  Outpatient History and Physical / Surgical Orders    Primary Care Provider: Shay Vora MD    Referring Provider: Rupinder Hankins MD    Subjective      Patient Name: Atilio Olson : 1950    HPI  The patient is a 74-year-old female that we met in the past.  He has a hernia in the left inguinal area.  He has been having some discomfort there and had a CT scan last year that did show a fat-containing left inguinal hernia.    Past History:  Medical History: has a past medical history of Abnormal ECG ([id number]), Acid reflux, Arthritis, Arthritis of back, Arthritis of neck, Bladder disorder, BPH with urinary obstruction (2024), Cervical disc disorder, Claustrophobia, Colon polyp, Congestive heart failure (CHF), Deep vein thrombosis (), Diabetes, Elevated serum creatinine, Fracture, foot, High cholesterol, Hip arthrosis, Hypertension, Knee swelling, Limb swelling, Low back pain, Lumbosacral disc disease, Neurogenic bladder (2017), NSTEMI (non-ST elevated myocardial infarction) (2024), Periarthritis of shoulder, Peripheral neuropathy, Spinal stenosis, Stroke, Tear of meniscus of knee, and Tendonitis of shoulder (2018).   Surgical History: has a past surgical history that includes Other surgical history; Other surgical history; Colonoscopy; Excision; Gallbladder surgery; Hernia repair; Other surgical history; Replacement total knee (Left); Replacement total knee (Right); Tonsillectomy; Wrist surgery; Lumbar discectomy (Left, 2023); Hand surgery; Shoulder surgery (Right); Back surgery; Joint replacement; Foot surgery (Left); Cardiac catheterization (N/A, 2024); Cystoscopy; PROSTATE AQUABLATION (N/A, 2024); Cystoscopy (N/A, 10/18/2024); and Epididymectomy (Right, 10/18/2024).   Family  History: family history includes Arthritis in his father and mother; Diabetes in his father, mother, sister, and son; Hearing loss in his father; Heart disease in his father and mother; Stroke in his father and sister.   Social History: reports that he has been smoking cigarettes. He has a 15 pack-year smoking history. He has never used smokeless tobacco. He reports that he does not currently use alcohol. He reports that he does not use drugs.  Allergies: Patient has no known allergies.       Current Outpatient Medications:     amoxicillin (AMOXIL) 500 MG capsule, , Disp: , Rfl:     aspirin 81 MG EC tablet, Take 1 tablet by mouth Daily. If urine clearing and not passing excessive clot, Disp: , Rfl:     atorvastatin (LIPITOR) 80 MG tablet, Take 1 tablet by mouth Every Night., Disp: , Rfl:     B Complex-C-Folic Acid (FOLBEE PLUS PO), Take 1 tablet by mouth Daily., Disp: , Rfl:     bacitracin-polymyxin b (POLYSPORIN) 500-24592 UNIT/GM ointment, Apply to affected area 2-3 times daily, Disp: 28.4 g, Rfl: 0    baclofen (LIORESAL) 10 MG tablet, Take 1 tablet by mouth 3 (Three) Times a Day., Disp: , Rfl:     clopidogrel (PLAVIX) 75 MG tablet, Take 1 tablet by mouth Daily. If urine clearing and not passing excessive clot  Indications: Resume on 3/24/23, Disp: , Rfl:     doxycycline (VIBRAMYCIN) 100 MG capsule, Take 1 capsule by mouth 2 (Two) Times a Day for 14 days., Disp: 28 capsule, Rfl: 0    gabapentin (NEURONTIN) 800 MG tablet, Take 1 tablet by mouth 3 (Three) Times a Day., Disp: , Rfl:     Loratadine 10 MG capsule, Take 1 capsule by mouth Daily., Disp: , Rfl:     losartan-HCTZ (HYZAAR) 100-12.5 combo dose, Take 1 dose by mouth Daily., Disp: , Rfl:     metFORMIN (GLUCOPHAGE) 500 MG tablet, Take 1 tablet by mouth 2 (Two) Times a Day With Meals., Disp: , Rfl:     multivitamin with minerals tablet tablet, Take 1 tablet by mouth Daily., Disp: , Rfl:     naloxone (NARCAN) 4 MG/0.1ML nasal spray, Call 911. Don't prime. [place]  "in 1 nostril for overdose. Repeat in 2-3 minutes in other nostril if no or minimal breathing/responsiveness., Disp: 2 each, Rfl: 0    oxaprozin (Daypro) 600 MG tablet, Take 1 tablet by mouth 2 (Two) Times a Day for 20 days., Disp: 40 tablet, Rfl: 0    pantoprazole (PROTONIX) 20 MG EC tablet, Take 1 tablet by mouth Daily., Disp: , Rfl:     temazepam (RESTORIL) 15 MG capsule, Take 1 capsule by mouth At Night As Needed for Sleep or Anxiety., Disp: , Rfl:     traMADol (ULTRAM) 50 MG tablet, Take 1 tablet by mouth Every 8 (Eight) Hours As Needed for Moderate Pain., Disp: , Rfl:        Objective   Vital Signs:   /74 (BP Location: Right arm, Patient Position: Sitting, Cuff Size: Adult)   Pulse 67   Ht 162.6 cm (64\")   Wt 75.4 kg (166 lb 3.2 oz)   BMI 28.53 kg/m²       Physical Exam  Vitals and nursing note reviewed.   Constitutional:       Appearance: Normal appearance. The patient is well-developed.   Cardiovascular:      Rate and Rhythm: Normal rate and regular rhythm.   Pulmonary:      Effort: Pulmonary effort is normal.      Breath sounds: Normal air entry.   Abdominal:      General: Bowel sounds are normal.      Palpations: Abdomen is soft.      Skin:     General: Skin is warm and dry.   Neurological:      Mental Status: The patient is alert and oriented to person, place, and time.      Motor: Motor function is intact.   Psychiatric:         Mood and Affect: Mood normal.   Groin: Reducible left inguinal hernia noted    Result Review :               Assessment and Plan   Diagnoses and all orders for this visit:    1. Left inguinal hernia (Primary)  -     Case Request; Standing  -     Follow Anesthesia Guidelines / Protocol; Future  -     Follow Anesthesia Guidelines / Protocol; Standing  -     Verify NPO Status; Standing  -     Verify / Perform Chlorhexidine Skin Prep; Standing  -     Insert Peripheral IV; Standing  -     Saline Lock & Maintain IV Access; Standing  -     sodium chloride 0.9 % flush 10 mL  -   "   sodium chloride 0.9 % flush 10 mL  -     sodium chloride 0.9 % infusion 40 mL  -     Place Sequential Compression Device; Standing  -     Maintain Sequential Compression Device; Standing  -     ondansetron (ZOFRAN) injection 4 mg  -     ceFAZolin (ANCEF) 2,000 mg in sodium chloride 0.9 % 100 mL IVPB  -     Case Request    We will schedule him for a robotic left inguinal hernia repair.  I have described the procedure to him as well as the risk and benefits and he is agreeable to proceeding.    I  Wilder Wiggins MD  03/18/2025

## 2025-03-18 NOTE — H&P (VIEW-ONLY)
Inpatient History and Physical Surgical Orders    Preadmission Location:   Preadmission Time:  Facility:  Surgery Date:  Surgery Time:  Preadmission Test date:     Chief Complaint  Outpatient History and Physical / Surgical Orders    Primary Care Provider: Shay Vora MD    Referring Provider: Rupinder Hankins MD    Subjective      Patient Name: Atilio Olson : 1950    HPI  The patient is a 74-year-old female that we met in the past.  He has a hernia in the left inguinal area.  He has been having some discomfort there and had a CT scan last year that did show a fat-containing left inguinal hernia.    Past History:  Medical History: has a past medical history of Abnormal ECG ([id number]), Acid reflux, Arthritis, Arthritis of back, Arthritis of neck, Bladder disorder, BPH with urinary obstruction (2024), Cervical disc disorder, Claustrophobia, Colon polyp, Congestive heart failure (CHF), Deep vein thrombosis (), Diabetes, Elevated serum creatinine, Fracture, foot, High cholesterol, Hip arthrosis, Hypertension, Knee swelling, Limb swelling, Low back pain, Lumbosacral disc disease, Neurogenic bladder (2017), NSTEMI (non-ST elevated myocardial infarction) (2024), Periarthritis of shoulder, Peripheral neuropathy, Spinal stenosis, Stroke, Tear of meniscus of knee, and Tendonitis of shoulder (2018).   Surgical History: has a past surgical history that includes Other surgical history; Other surgical history; Colonoscopy; Excision; Gallbladder surgery; Hernia repair; Other surgical history; Replacement total knee (Left); Replacement total knee (Right); Tonsillectomy; Wrist surgery; Lumbar discectomy (Left, 2023); Hand surgery; Shoulder surgery (Right); Back surgery; Joint replacement; Foot surgery (Left); Cardiac catheterization (N/A, 2024); Cystoscopy; PROSTATE AQUABLATION (N/A, 2024); Cystoscopy (N/A, 10/18/2024); and Epididymectomy (Right, 10/18/2024).   Family  History: family history includes Arthritis in his father and mother; Diabetes in his father, mother, sister, and son; Hearing loss in his father; Heart disease in his father and mother; Stroke in his father and sister.   Social History: reports that he has been smoking cigarettes. He has a 15 pack-year smoking history. He has never used smokeless tobacco. He reports that he does not currently use alcohol. He reports that he does not use drugs.  Allergies: Patient has no known allergies.       Current Outpatient Medications:     amoxicillin (AMOXIL) 500 MG capsule, , Disp: , Rfl:     aspirin 81 MG EC tablet, Take 1 tablet by mouth Daily. If urine clearing and not passing excessive clot, Disp: , Rfl:     atorvastatin (LIPITOR) 80 MG tablet, Take 1 tablet by mouth Every Night., Disp: , Rfl:     B Complex-C-Folic Acid (FOLBEE PLUS PO), Take 1 tablet by mouth Daily., Disp: , Rfl:     bacitracin-polymyxin b (POLYSPORIN) 500-05874 UNIT/GM ointment, Apply to affected area 2-3 times daily, Disp: 28.4 g, Rfl: 0    baclofen (LIORESAL) 10 MG tablet, Take 1 tablet by mouth 3 (Three) Times a Day., Disp: , Rfl:     clopidogrel (PLAVIX) 75 MG tablet, Take 1 tablet by mouth Daily. If urine clearing and not passing excessive clot  Indications: Resume on 3/24/23, Disp: , Rfl:     doxycycline (VIBRAMYCIN) 100 MG capsule, Take 1 capsule by mouth 2 (Two) Times a Day for 14 days., Disp: 28 capsule, Rfl: 0    gabapentin (NEURONTIN) 800 MG tablet, Take 1 tablet by mouth 3 (Three) Times a Day., Disp: , Rfl:     Loratadine 10 MG capsule, Take 1 capsule by mouth Daily., Disp: , Rfl:     losartan-HCTZ (HYZAAR) 100-12.5 combo dose, Take 1 dose by mouth Daily., Disp: , Rfl:     metFORMIN (GLUCOPHAGE) 500 MG tablet, Take 1 tablet by mouth 2 (Two) Times a Day With Meals., Disp: , Rfl:     multivitamin with minerals tablet tablet, Take 1 tablet by mouth Daily., Disp: , Rfl:     naloxone (NARCAN) 4 MG/0.1ML nasal spray, Call 911. Don't prime. [place]  "in 1 nostril for overdose. Repeat in 2-3 minutes in other nostril if no or minimal breathing/responsiveness., Disp: 2 each, Rfl: 0    oxaprozin (Daypro) 600 MG tablet, Take 1 tablet by mouth 2 (Two) Times a Day for 20 days., Disp: 40 tablet, Rfl: 0    pantoprazole (PROTONIX) 20 MG EC tablet, Take 1 tablet by mouth Daily., Disp: , Rfl:     temazepam (RESTORIL) 15 MG capsule, Take 1 capsule by mouth At Night As Needed for Sleep or Anxiety., Disp: , Rfl:     traMADol (ULTRAM) 50 MG tablet, Take 1 tablet by mouth Every 8 (Eight) Hours As Needed for Moderate Pain., Disp: , Rfl:        Objective   Vital Signs:   /74 (BP Location: Right arm, Patient Position: Sitting, Cuff Size: Adult)   Pulse 67   Ht 162.6 cm (64\")   Wt 75.4 kg (166 lb 3.2 oz)   BMI 28.53 kg/m²       Physical Exam  Vitals and nursing note reviewed.   Constitutional:       Appearance: Normal appearance. The patient is well-developed.   Cardiovascular:      Rate and Rhythm: Normal rate and regular rhythm.   Pulmonary:      Effort: Pulmonary effort is normal.      Breath sounds: Normal air entry.   Abdominal:      General: Bowel sounds are normal.      Palpations: Abdomen is soft.      Skin:     General: Skin is warm and dry.   Neurological:      Mental Status: The patient is alert and oriented to person, place, and time.      Motor: Motor function is intact.   Psychiatric:         Mood and Affect: Mood normal.   Groin: Reducible left inguinal hernia noted    Result Review :               Assessment and Plan   Diagnoses and all orders for this visit:    1. Left inguinal hernia (Primary)  -     Case Request; Standing  -     Follow Anesthesia Guidelines / Protocol; Future  -     Follow Anesthesia Guidelines / Protocol; Standing  -     Verify NPO Status; Standing  -     Verify / Perform Chlorhexidine Skin Prep; Standing  -     Insert Peripheral IV; Standing  -     Saline Lock & Maintain IV Access; Standing  -     sodium chloride 0.9 % flush 10 mL  -   "   sodium chloride 0.9 % flush 10 mL  -     sodium chloride 0.9 % infusion 40 mL  -     Place Sequential Compression Device; Standing  -     Maintain Sequential Compression Device; Standing  -     ondansetron (ZOFRAN) injection 4 mg  -     ceFAZolin (ANCEF) 2,000 mg in sodium chloride 0.9 % 100 mL IVPB  -     Case Request    We will schedule him for a robotic left inguinal hernia repair.  I have described the procedure to him as well as the risk and benefits and he is agreeable to proceeding.    I  Wilder Wiggins MD  03/18/2025

## 2025-03-18 NOTE — PROGRESS NOTES
UROLOGY OFFICE FOLLOW UP NOTE    Subjective   HPI  Atilio Olson is a 74 y.o. male. Presents for postop follow-up cystoscopy with urethral dilation, right epididymectomy with cyst excision , 10/18/2024.   Taught how to perform intermittent catheterization of his glans to maintain patency at last visit.  Presents for repeat PVR.     History of Present Illness  He reports an improvement in his urinary symptoms. However, he experiences a sensation akin to being kicked in the right side of his scrotum.   He is not currently taking any medication for his prostate.      He performs self-catheterization just of the glans approximately once every 10 days, which he finds painful. He has an adequate supply of catheters at home.          Update 3/18/2025: Presents for requested follow-up secondary to testicular pain, bilateral.  Had ultrasound ordered by PCP prior to today's visit.  History of Present Illness  He reports experiencing left-sided testicular discomfort, which has been progressively worsening over the past 3 weeks. He describes a sensation of swelling in the affected area. Despite being on amoxicillin for a dental issue for the last 10 days, there has been no improvement in his testicular symptoms.   He is not experiencing any urinary symptoms such as dysuria or hematuria and reports satisfactory urinary flow. He expresses satisfaction with his outlet procedure. He is currently on tramadol for pain management.        ________________  Scrotal ultrasound 3/8/2025: Asymmetrically smaller right testicle which is decreased in size from the prior outside ultrasound dated 9/9/2024. There is an area of slightly decreased echogenicity which appears to correspond to the lower pole of the right testicle measuring 2.2 x   1.7 x 1.9 cm. This likely represents sequelae of prior infection or surgery. An underlying testicular mass is also within the differential considerations but felt slightly less likely given the  "overall decrease in size of the right testicle. Recommend   urology follow-up.  2.Normal appearance of the left testicle.  3.Debris-filled left-sided hydrocele.    Epididymis pathology: - Benign epididymis and epididymal cysts      scrotal ultrasound 9/9/2024: Cystic replacement of the epididymal head on the right   with the largest of the cyst measuring 3.1 cm maximally. Bilateral   hydroceles, slightly more complex on the right than left. No   intratesticular abnormalities.    _____________  Prostate pathology, aqua ablation 5/6/2024: Benign prostatic hyperplasia     ______________  Uroflow performed, consistent with obstructive uropathy, flow rate max 5 mL/s.     IPSS performed reveals severe bother score of 29.     CT abdomen pelvis with contrast 2/15/2024 performed prior to this visit.  Stable left rigid lip adenoma; right renal cyst, enlarged prostate gland; no urinary bladder thickening.  Fat-containing left inguinal hernia.  Prostate gland measured and calculated out to 110 g approximately.     Office cystoscopy 2/27/2024: Severe prostatomegaly with bilateral coapting lateral lobes, some varicosities, elongated prostatic urethra; no median lobe or significant intravesical prostate tissue.  Cystoscopy revealed normal capacity bladder which was decompressed upon entry, one right and left ureteral orifice in the normal anatomic position, normal bladder mucosa and no tumors, masses or stones.      Results for orders placed or performed in visit on 03/18/25   Bladder Scan    Collection Time: 03/18/25  8:45 AM   Result Value Ref Range    Urine Volume 32          Review of systems  A review of systems was performed, and positive findings are noted in the HPI.    Objective     Vital Signs:   Resp 18   Ht 162.6 cm (64.02\")   Wt 71.7 kg (158 lb)   BMI 27.11 kg/m²       Physical exam  No acute distress, well-nourished  Awake alert and oriented  Mood normal; affect normal  Physical Exam  Exam somewhat limited due to " habitus; there is tenderness above the left side of the scrotum; no significantly large hydrocele; testis palpably normal      Bladder Scan interpretation 03/18/2025    Estimation of residual urine via [employer] 3000 [employer] Bladder Scan  Performed by: CLAIRE Alvarenga  Residual Urine: 32 ml  Indication: Epididymal cyst    Non-recurrent unilateral inguinal hernia without obstruction or gangrene    Epididymitis    Pain in left testicle   Position: Supine  Examination: Incremental scanning of the suprapubic area using 2.0 MHz transducer using copious amounts of acoustic gel.   Findings: An anechoic area was demonstrated which represented the bladder, with measurement of residual urine as noted. I inspected this myself. In that the residual urine was stable or insignificant, refer to plan for treatment and plan necessary at this time.     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 Epididymal cyst (Primary)  -     Bladder Scan    2. Non-recurrent unilateral inguinal hernia without obstruction or gangrene  -     Ambulatory Referral to General Surgery    3. Epididymitis  -     doxycycline (VIBRAMYCIN) 100 MG capsule; Take 1 capsule by mouth 2 (Two) Times a Day for 14 days.  Dispense: 28 capsule; Refill: 0    4. Pain in left testicle  -     doxycycline  (VIBRAMYCIN) 100 MG capsule; Take 1 capsule by mouth 2 (Two) Times a Day for 14 days.  Dispense: 28 capsule; Refill: 0  -     oxaprozin (Daypro) 600 MG tablet; Take 1 tablet by mouth 2 (Two) Times a Day for 20 days.  Dispense: 40 tablet; Refill: 0      Emptying well, no lower urinary tract complaints  Assessment & Plan    The patient reports left-sided testicular pain that has been gradually increasing over the past 3 weeks. An ultrasound revealed a hydrocele on the left side.  This appears stable from prior ultrasound imaging.      Zickel exam findings discussed with patient, given the possibility of epididymal orchitis, doxycycline will be prescribed for 14 days.     Prior CT with left inguinal fat-containing hernia; believe this may be source of his pain given stability of hydrocele.  Typically hydroceles do not present in this manner.    A general surgery consultation will be arranged to evaluate a potential left inguinal hernia, which may be contributing to the pain.     A prescription for Daypro will also be provided to manage scrotal discomfort. If the general surgeon determines that the hernia is not the cause, the patient will return for further evaluation.    Follow-up  The patient will follow up in 3 weeks, PVR       All questions addressed      Patient or patient representative verbalized consent for the use of Ambient Listening during the visit with  Rupinder Hankins MD for chart documentation. 3/18/2025  15:55 EDT

## 2025-03-21 ENCOUNTER — HOSPITAL ENCOUNTER (OUTPATIENT)
Dept: CT IMAGING | Facility: HOSPITAL | Age: 75
Discharge: HOME | End: 2025-03-21
Payer: MEDICARE

## 2025-03-21 DIAGNOSIS — R91.1 LUNG NODULE: ICD-10-CM

## 2025-03-21 LAB
CREAT BLDA-MCNC: 1.1 MG/DL (ref 0.6–1.3)
EGFRCR SERPLBLD CKD-EPI 2021: 70.9 ML/MIN/1.73

## 2025-03-21 PROCEDURE — 71260 CT THORAX DX C+: CPT

## 2025-03-21 PROCEDURE — 82565 ASSAY OF CREATININE: CPT

## 2025-03-21 PROCEDURE — 25510000001 IOPAMIDOL PER 1 ML: Performed by: INTERNAL MEDICINE

## 2025-03-21 RX ORDER — IOPAMIDOL 755 MG/ML
100 INJECTION, SOLUTION INTRAVASCULAR
Status: COMPLETED | OUTPATIENT
Start: 2025-03-21 | End: 2025-03-21

## 2025-03-21 RX ADMIN — IOPAMIDOL 100 ML: 755 INJECTION, SOLUTION INTRAVENOUS at 10:45

## 2025-03-31 NOTE — PRE-PROCEDURE INSTRUCTIONS
PATIENT INSTRUCTED TO BE:    - NOTHING TO EAT AFTER MIDNIGHT OR CHEW, EXCEPT CAN HAVE CLEAR LIQUIDS 2 HOURS PRIOR TO SURGERY ARRIVAL TIME , NO MORE THAN 8 OZ. (NOTHING RED)     - TO HOLD ALL VITAMINS, SUPPLEMENTS, NSAIDS FOR ONE WEEK PRIOR TO THEIR SURGICAL PROCEDURE         - BATHING INSTRUCTIONS GIVEN    INSTRUCTED NO LOTIONS, JEWELRY, PIERCINGS,  NAIL POLISH, OR DEODORANT DAY OF SURGERY        -INSTRUCTED TO TAKE THE FOLLOWING MEDICATIONS THE DAY OF SURGERY WITH SIPS OF WATER:   Tramadol, Gabapentin, Loratadine, Baclofen Protonix        - DO NOT BRING ANY MEDICATIONS WITH YOU TO THE HOSPITAL THE DAY OF SURGERY, EXCEPT IF USE INHALERS. BRING INHALERS DAY OF SURGERY       - BRING CPAP OR BIPAP TO THE HOSPITAL ONLY IF YOU ARE SPENDING THE NIGHT    - DO NOT SMOKE OR VAPE 24 HOURS PRIOR TO PROCEDURE PER ANESTHESIA REQUEST     -MAKE SURE YOU HAVE A RIDE HOME OR SOMEONE TO STAY WITH YOU THE DAY OF THE PROCEDURE AFTER YOU GO HOME     - FOLLOW ANY OTHER INSTRUCTIONS GIVEN TO YOU BY YOUR SURGEON'S OFFICE.     Main Entrance [facility], Take elevator to first floor, turn left and check in at registration   - YOU WILL RECEIVE A PHONE CALL THE DAY PRIOR TO SURGERY BETWEEN 1PM AND 4 PM WITH ARRIVAL TIME, IF YOUR SURGERY IS ON A MONDAY YOU WILL RECEIVE A CALL THE FRIDAY PRIOR TO SURGERY DATE    - BRING CASH OR CREDIT CARD FOR COPAYMENT OF MEDICATIONS AFTER SURGERY IF YOU USE THE HOSPITAL PHARMACY (MEDS TO BED)    - PREADMISSION TESTING NURSE GÉNESIS CHOI [phone] IF HAVE ANY QUESTIONS     -PATIENT PROVIDED THE NUMBER FOR PREOP SURGICAL DEPT IF HAD QUESTIONS AFTER HOURS PRIOR TO SURGERY ([phone].  INFORMED PT IF NO ANSWER, LEAVE A MESSAGE AND SOMEONE WILL RETURN THEIR CALL       PATIENT VERBALIZED UNDERSTANDING

## 2025-04-01 ENCOUNTER — ANESTHESIA EVENT (OUTPATIENT)
Dept: PERIOP | Facility: HOSPITAL | Age: 75
End: 2025-04-01
Payer: MEDICARE

## 2025-04-02 ENCOUNTER — ANESTHESIA (OUTPATIENT)
Dept: PERIOP | Facility: HOSPITAL | Age: 75
End: 2025-04-02
Payer: MEDICARE

## 2025-04-02 ENCOUNTER — HOSPITAL ENCOUNTER (OUTPATIENT)
Facility: HOSPITAL | Age: 75
Discharge: HOME | End: 2025-04-03
Attending: SURGERY | Admitting: SURGERY
Payer: MEDICARE

## 2025-04-02 DIAGNOSIS — K40.90 LEFT INGUINAL HERNIA: ICD-10-CM

## 2025-04-02 LAB
ANION GAP SERPL CALCULATED.3IONS-SCNC: 10.1 MMOL/L (ref 5–15)
BUN SERPL-MCNC: 31 MG/DL (ref 8–23)
BUN/CREAT SERPL: 25.8 (ref 7–25)
CALCIUM SPEC-SCNC: 9.6 MG/DL (ref 8.6–10.5)
CHLORIDE SERPL-SCNC: 100 MMOL/L (ref 98–107)
CO2 SERPL-SCNC: 23.9 MMOL/L (ref 22–29)
CREAT SERPL-MCNC: 1.2 MG/DL (ref 0.76–1.27)
EGFRCR SERPLBLD CKD-EPI 2021: 63.9 ML/MIN/1.73
GLUCOSE BLDC GLUCOMTR-MCNC: 190 MG/DL (ref 70–99)
GLUCOSE BLDC GLUCOMTR-MCNC: 238 MG/DL (ref 70–99)
GLUCOSE BLDC GLUCOMTR-MCNC: 241 MG/DL (ref 70–99)
GLUCOSE SERPL-MCNC: 177 MG/DL (ref 65–99)
POTASSIUM SERPL-SCNC: 4.7 MMOL/L (ref 3.5–5.2)
SODIUM SERPL-SCNC: 134 MMOL/L (ref 136–145)

## 2025-04-02 PROCEDURE — 25010000002 SUGAMMADEX 200 MG/2ML SOLUTION: Performed by: NURSE ANESTHETIST, CERTIFIED REGISTERED

## 2025-04-02 PROCEDURE — 63710000001 HYDROCODONE-ACETAMINOPHEN 5-325 MG TABLET: Performed by: SURGERY

## 2025-04-02 PROCEDURE — 25010000002 CEFAZOLIN PER 500 MG: Performed by: SURGERY

## 2025-04-02 PROCEDURE — 63710000001 TEMAZEPAM 15 MG CAPSULE: Performed by: SURGERY

## 2025-04-02 PROCEDURE — 25010000002 MIDAZOLAM PER 1MG: Performed by: ANESTHESIOLOGY

## 2025-04-02 PROCEDURE — 25010000002 HYDROMORPHONE 1 MG/ML SOLUTION: Performed by: NURSE ANESTHETIST, CERTIFIED REGISTERED

## 2025-04-02 PROCEDURE — 63710000001 GABAPENTIN 300 MG CAPSULE: Performed by: SURGERY

## 2025-04-02 PROCEDURE — 80048 BASIC METABOLIC PNL TOTAL CA: CPT | Performed by: ANESTHESIOLOGY

## 2025-04-02 PROCEDURE — 25010000002 ONDANSETRON PER 1 MG: Performed by: NURSE ANESTHETIST, CERTIFIED REGISTERED

## 2025-04-02 PROCEDURE — 63710000001 ATORVASTATIN 40 MG TABLET: Performed by: NURSE PRACTITIONER

## 2025-04-02 PROCEDURE — 25810000003 SODIUM CHLORIDE 0.9 % SOLUTION: Performed by: NURSE PRACTITIONER

## 2025-04-02 PROCEDURE — A9270 NON-COVERED ITEM OR SERVICE: HCPCS | Performed by: NURSE PRACTITIONER

## 2025-04-02 PROCEDURE — 25010000002 LIDOCAINE PF 2% 2 % SOLUTION

## 2025-04-02 PROCEDURE — A9270 NON-COVERED ITEM OR SERVICE: HCPCS | Performed by: NURSE ANESTHETIST, CERTIFIED REGISTERED

## 2025-04-02 PROCEDURE — 25010000002 PROPOFOL 10 MG/ML EMULSION

## 2025-04-02 PROCEDURE — 25010000002 DEXAMETHASONE PER 1 MG: Performed by: NURSE ANESTHETIST, CERTIFIED REGISTERED

## 2025-04-02 PROCEDURE — A9270 NON-COVERED ITEM OR SERVICE: HCPCS | Performed by: SURGERY

## 2025-04-02 PROCEDURE — 25010000002 KETOROLAC TROMETHAMINE PER 15 MG: Performed by: ANESTHESIOLOGY

## 2025-04-02 PROCEDURE — 63710000001 BACLOFEN 10 MG TABLET: Performed by: NURSE PRACTITIONER

## 2025-04-02 PROCEDURE — 25010000002 GLYCOPYRROLATE 0.2 MG/ML SOLUTION

## 2025-04-02 PROCEDURE — S2900 ROBOTIC SURGICAL SYSTEM: HCPCS | Performed by: SURGERY

## 2025-04-02 PROCEDURE — 49650 LAP ING HERNIA REPAIR INIT: CPT | Performed by: SURGERY

## 2025-04-02 PROCEDURE — 63710000001 OXYCODONE 5 MG TABLET: Performed by: NURSE ANESTHETIST, CERTIFIED REGISTERED

## 2025-04-02 PROCEDURE — 25010000002 FENTANYL CITRATE (PF) 50 MCG/ML SOLUTION

## 2025-04-02 PROCEDURE — 63710000001 HYDROCHLOROTHIAZIDE 12.5 MG TABLET: Performed by: SURGERY

## 2025-04-02 PROCEDURE — 25010000002 BUPIVACAINE (PF) 0.25 % SOLUTION: Performed by: SURGERY

## 2025-04-02 PROCEDURE — 82948 REAGENT STRIP/BLOOD GLUCOSE: CPT

## 2025-04-02 PROCEDURE — 25010000002 HYDROMORPHONE 1 MG/ML SOLUTION: Performed by: SURGERY

## 2025-04-02 PROCEDURE — 25010000002 KETOROLAC TROMETHAMINE PER 15 MG: Performed by: NURSE ANESTHETIST, CERTIFIED REGISTERED

## 2025-04-02 PROCEDURE — 63710000001 LOSARTAN 50 MG TABLET: Performed by: SURGERY

## 2025-04-02 PROCEDURE — 82948 REAGENT STRIP/BLOOD GLUCOSE: CPT | Performed by: NURSE ANESTHETIST, CERTIFIED REGISTERED

## 2025-04-02 PROCEDURE — 25810000003 LACTATED RINGERS PER 1000 ML: Performed by: ANESTHESIOLOGY

## 2025-04-02 PROCEDURE — 25010000002 MORPHINE PER 10 MG: Performed by: NURSE PRACTITIONER

## 2025-04-02 PROCEDURE — C1781 MESH (IMPLANTABLE): HCPCS | Performed by: SURGERY

## 2025-04-02 PROCEDURE — 25810000003 LACTATED RINGERS PER 1000 ML

## 2025-04-02 PROCEDURE — 63710000001 INSULIN LISPRO (HUMAN) PER 5 UNITS: Performed by: NURSE PRACTITIONER

## 2025-04-02 DEVICE — MESH PROGRIP LAP S/FIXATING LPG1510: Type: IMPLANTABLE DEVICE | Site: ABDOMEN | Status: FUNCTIONAL

## 2025-04-02 DEVICE — ABSORBABLE WOUND CLOSURE DEVICE
Type: IMPLANTABLE DEVICE | Site: ABDOMEN | Status: FUNCTIONAL
Brand: V-LOC 180

## 2025-04-02 RX ORDER — SODIUM CHLORIDE, SODIUM LACTATE, POTASSIUM CHLORIDE, CALCIUM CHLORIDE 600; 310; 30; 20 MG/100ML; MG/100ML; MG/100ML; MG/100ML
INJECTION, SOLUTION INTRAVENOUS CONTINUOUS PRN
Status: DISCONTINUED | OUTPATIENT
Start: 2025-04-02 | End: 2025-04-02 | Stop reason: SURG

## 2025-04-02 RX ORDER — LOSARTAN POTASSIUM 50 MG/1
100 TABLET ORAL EVERY EVENING
Status: DISCONTINUED | OUTPATIENT
Start: 2025-04-02 | End: 2025-04-03 | Stop reason: HOSPADM

## 2025-04-02 RX ORDER — MORPHINE SULFATE 2 MG/ML
2 INJECTION, SOLUTION INTRAMUSCULAR; INTRAVENOUS
Status: DISCONTINUED | OUTPATIENT
Start: 2025-04-02 | End: 2025-04-03 | Stop reason: HOSPADM

## 2025-04-02 RX ORDER — HYDROCODONE BITARTRATE AND ACETAMINOPHEN 5; 325 MG/1; MG/1
1 TABLET ORAL ONCE AS NEEDED
Status: DISCONTINUED | OUTPATIENT
Start: 2025-04-02 | End: 2025-04-02 | Stop reason: HOSPADM

## 2025-04-02 RX ORDER — PROMETHAZINE HYDROCHLORIDE 25 MG/1
25 TABLET ORAL ONCE AS NEEDED
Status: DISCONTINUED | OUTPATIENT
Start: 2025-04-02 | End: 2025-04-02 | Stop reason: HOSPADM

## 2025-04-02 RX ORDER — EPHEDRINE SULFATE 50 MG/ML
INJECTION INTRAVENOUS AS NEEDED
Status: DISCONTINUED | OUTPATIENT
Start: 2025-04-02 | End: 2025-04-02 | Stop reason: SURG

## 2025-04-02 RX ORDER — ACETAMINOPHEN 325 MG/1
650 TABLET ORAL EVERY 4 HOURS PRN
Status: DISCONTINUED | OUTPATIENT
Start: 2025-04-02 | End: 2025-04-03 | Stop reason: HOSPADM

## 2025-04-02 RX ORDER — BISACODYL 5 MG/1
5 TABLET, DELAYED RELEASE ORAL DAILY PRN
Status: DISCONTINUED | OUTPATIENT
Start: 2025-04-02 | End: 2025-04-03 | Stop reason: HOSPADM

## 2025-04-02 RX ORDER — PHENYLEPHRINE HCL IN 0.9% NACL 1 MG/10 ML
SYRINGE (ML) INTRAVENOUS AS NEEDED
Status: DISCONTINUED | OUTPATIENT
Start: 2025-04-02 | End: 2025-04-02 | Stop reason: SURG

## 2025-04-02 RX ORDER — CELECOXIB 100 MG/1
200 CAPSULE ORAL ONCE
Status: COMPLETED | OUTPATIENT
Start: 2025-04-02 | End: 2025-04-02

## 2025-04-02 RX ORDER — BISACODYL 10 MG
10 SUPPOSITORY, RECTAL RECTAL DAILY PRN
Status: DISCONTINUED | OUTPATIENT
Start: 2025-04-02 | End: 2025-04-03 | Stop reason: HOSPADM

## 2025-04-02 RX ORDER — IBUPROFEN 600 MG/1
1 TABLET ORAL
Status: DISCONTINUED | OUTPATIENT
Start: 2025-04-02 | End: 2025-04-03 | Stop reason: HOSPADM

## 2025-04-02 RX ORDER — SODIUM CHLORIDE 9 MG/ML
40 INJECTION, SOLUTION INTRAVENOUS AS NEEDED
Status: DISCONTINUED | OUTPATIENT
Start: 2025-04-02 | End: 2025-04-02 | Stop reason: HOSPADM

## 2025-04-02 RX ORDER — FENTANYL CITRATE 50 UG/ML
INJECTION, SOLUTION INTRAMUSCULAR; INTRAVENOUS AS NEEDED
Status: DISCONTINUED | OUTPATIENT
Start: 2025-04-02 | End: 2025-04-02 | Stop reason: SURG

## 2025-04-02 RX ORDER — HYDROCODONE BITARTRATE AND ACETAMINOPHEN 5; 325 MG/1; MG/1
1 TABLET ORAL EVERY 6 HOURS PRN
Refills: 0 | Status: DISCONTINUED | OUTPATIENT
Start: 2025-04-02 | End: 2025-04-03

## 2025-04-02 RX ORDER — NALOXONE HCL 0.4 MG/ML
0.4 VIAL (ML) INJECTION
Status: DISCONTINUED | OUTPATIENT
Start: 2025-04-02 | End: 2025-04-03 | Stop reason: HOSPADM

## 2025-04-02 RX ORDER — MAGNESIUM HYDROXIDE 1200 MG/15ML
LIQUID ORAL AS NEEDED
Status: DISCONTINUED | OUTPATIENT
Start: 2025-04-02 | End: 2025-04-02 | Stop reason: HOSPADM

## 2025-04-02 RX ORDER — SODIUM CHLORIDE, SODIUM LACTATE, POTASSIUM CHLORIDE, CALCIUM CHLORIDE 600; 310; 30; 20 MG/100ML; MG/100ML; MG/100ML; MG/100ML
9 INJECTION, SOLUTION INTRAVENOUS CONTINUOUS
Status: DISCONTINUED | OUTPATIENT
Start: 2025-04-02 | End: 2025-04-02

## 2025-04-02 RX ORDER — BUPIVACAINE HYDROCHLORIDE 2.5 MG/ML
INJECTION, SOLUTION EPIDURAL; INFILTRATION; INTRACAUDAL; PERINEURAL AS NEEDED
Status: DISCONTINUED | OUTPATIENT
Start: 2025-04-02 | End: 2025-04-02 | Stop reason: HOSPADM

## 2025-04-02 RX ORDER — ATORVASTATIN CALCIUM 40 MG/1
80 TABLET, FILM COATED ORAL NIGHTLY
Status: DISCONTINUED | OUTPATIENT
Start: 2025-04-02 | End: 2025-04-03 | Stop reason: HOSPADM

## 2025-04-02 RX ORDER — KETOROLAC TROMETHAMINE 30 MG/ML
INJECTION, SOLUTION INTRAMUSCULAR; INTRAVENOUS AS NEEDED
Status: DISCONTINUED | OUTPATIENT
Start: 2025-04-02 | End: 2025-04-02 | Stop reason: SURG

## 2025-04-02 RX ORDER — SODIUM CHLORIDE 9 MG/ML
40 INJECTION, SOLUTION INTRAVENOUS AS NEEDED
Status: DISCONTINUED | OUTPATIENT
Start: 2025-04-02 | End: 2025-04-03 | Stop reason: HOSPADM

## 2025-04-02 RX ORDER — ONDANSETRON 2 MG/ML
INJECTION INTRAMUSCULAR; INTRAVENOUS AS NEEDED
Status: DISCONTINUED | OUTPATIENT
Start: 2025-04-02 | End: 2025-04-02 | Stop reason: SURG

## 2025-04-02 RX ORDER — ALBUTEROL SULFATE 0.83 MG/ML
2.5 SOLUTION RESPIRATORY (INHALATION) ONCE
Status: COMPLETED | OUTPATIENT
Start: 2025-04-02 | End: 2025-04-02

## 2025-04-02 RX ORDER — SODIUM CHLORIDE 0.9 % (FLUSH) 0.9 %
10 SYRINGE (ML) INJECTION EVERY 12 HOURS SCHEDULED
Status: DISCONTINUED | OUTPATIENT
Start: 2025-04-02 | End: 2025-04-03 | Stop reason: HOSPADM

## 2025-04-02 RX ORDER — AMOXICILLIN 250 MG
2 CAPSULE ORAL 2 TIMES DAILY PRN
Status: DISCONTINUED | OUTPATIENT
Start: 2025-04-02 | End: 2025-04-03 | Stop reason: HOSPADM

## 2025-04-02 RX ORDER — SODIUM CHLORIDE 0.9 % (FLUSH) 0.9 %
10 SYRINGE (ML) INJECTION EVERY 12 HOURS SCHEDULED
Status: DISCONTINUED | OUTPATIENT
Start: 2025-04-02 | End: 2025-04-02 | Stop reason: HOSPADM

## 2025-04-02 RX ORDER — PROMETHAZINE HYDROCHLORIDE 25 MG/1
25 SUPPOSITORY RECTAL ONCE AS NEEDED
Status: DISCONTINUED | OUTPATIENT
Start: 2025-04-02 | End: 2025-04-02 | Stop reason: HOSPADM

## 2025-04-02 RX ORDER — DEXAMETHASONE SODIUM PHOSPHATE 4 MG/ML
INJECTION, SOLUTION INTRA-ARTICULAR; INTRALESIONAL; INTRAMUSCULAR; INTRAVENOUS; SOFT TISSUE AS NEEDED
Status: DISCONTINUED | OUTPATIENT
Start: 2025-04-02 | End: 2025-04-02 | Stop reason: SURG

## 2025-04-02 RX ORDER — SODIUM CHLORIDE 0.9 % (FLUSH) 0.9 %
10 SYRINGE (ML) INJECTION AS NEEDED
Status: DISCONTINUED | OUTPATIENT
Start: 2025-04-02 | End: 2025-04-02 | Stop reason: HOSPADM

## 2025-04-02 RX ORDER — NICOTINE POLACRILEX 4 MG
15 LOZENGE BUCCAL
Status: DISCONTINUED | OUTPATIENT
Start: 2025-04-02 | End: 2025-04-03 | Stop reason: HOSPADM

## 2025-04-02 RX ORDER — BACLOFEN 10 MG/1
10 TABLET ORAL 3 TIMES DAILY
Status: DISCONTINUED | OUTPATIENT
Start: 2025-04-02 | End: 2025-04-03 | Stop reason: HOSPADM

## 2025-04-02 RX ORDER — OXYCODONE HYDROCHLORIDE 5 MG/1
5 TABLET ORAL
Status: COMPLETED | OUTPATIENT
Start: 2025-04-02 | End: 2025-04-02

## 2025-04-02 RX ORDER — LIDOCAINE HYDROCHLORIDE 20 MG/ML
INJECTION, SOLUTION EPIDURAL; INFILTRATION; INTRACAUDAL; PERINEURAL AS NEEDED
Status: DISCONTINUED | OUTPATIENT
Start: 2025-04-02 | End: 2025-04-02 | Stop reason: SURG

## 2025-04-02 RX ORDER — KETOROLAC TROMETHAMINE 30 MG/ML
30 INJECTION, SOLUTION INTRAMUSCULAR; INTRAVENOUS ONCE
Status: DISCONTINUED | OUTPATIENT
Start: 2025-04-02 | End: 2025-04-02

## 2025-04-02 RX ORDER — HYDROCHLOROTHIAZIDE 12.5 MG/1
12.5 TABLET ORAL EVERY EVENING
Status: DISCONTINUED | OUTPATIENT
Start: 2025-04-02 | End: 2025-04-03 | Stop reason: HOSPADM

## 2025-04-02 RX ORDER — PROPOFOL 10 MG/ML
VIAL (ML) INTRAVENOUS AS NEEDED
Status: DISCONTINUED | OUTPATIENT
Start: 2025-04-02 | End: 2025-04-02 | Stop reason: SURG

## 2025-04-02 RX ORDER — POLYETHYLENE GLYCOL 3350 17 G/17G
17 POWDER, FOR SOLUTION ORAL DAILY PRN
Status: DISCONTINUED | OUTPATIENT
Start: 2025-04-02 | End: 2025-04-03 | Stop reason: HOSPADM

## 2025-04-02 RX ORDER — TEMAZEPAM 15 MG/1
15 CAPSULE ORAL NIGHTLY PRN
Status: DISCONTINUED | OUTPATIENT
Start: 2025-04-02 | End: 2025-04-03 | Stop reason: HOSPADM

## 2025-04-02 RX ORDER — ONDANSETRON 2 MG/ML
4 INJECTION INTRAMUSCULAR; INTRAVENOUS ONCE AS NEEDED
Status: DISCONTINUED | OUTPATIENT
Start: 2025-04-02 | End: 2025-04-02

## 2025-04-02 RX ORDER — ONDANSETRON 4 MG/1
4 TABLET, ORALLY DISINTEGRATING ORAL ONCE AS NEEDED
Status: DISCONTINUED | OUTPATIENT
Start: 2025-04-02 | End: 2025-04-02

## 2025-04-02 RX ORDER — ROCURONIUM BROMIDE 10 MG/ML
INJECTION, SOLUTION INTRAVENOUS AS NEEDED
Status: DISCONTINUED | OUTPATIENT
Start: 2025-04-02 | End: 2025-04-02 | Stop reason: SURG

## 2025-04-02 RX ORDER — KETOROLAC TROMETHAMINE 30 MG/ML
15 INJECTION, SOLUTION INTRAMUSCULAR; INTRAVENOUS ONCE
Status: COMPLETED | OUTPATIENT
Start: 2025-04-02 | End: 2025-04-02

## 2025-04-02 RX ORDER — SODIUM CHLORIDE 9 MG/ML
50 INJECTION, SOLUTION INTRAVENOUS CONTINUOUS
Status: DISCONTINUED | OUTPATIENT
Start: 2025-04-02 | End: 2025-04-03

## 2025-04-02 RX ORDER — ACETAMINOPHEN 500 MG
1000 TABLET ORAL ONCE
Status: COMPLETED | OUTPATIENT
Start: 2025-04-02 | End: 2025-04-02

## 2025-04-02 RX ORDER — HYDROCODONE BITARTRATE AND ACETAMINOPHEN 5; 325 MG/1; MG/1
1 TABLET ORAL EVERY 6 HOURS PRN
Qty: 10 TABLET | Refills: 0 | Status: SHIPPED | OUTPATIENT
Start: 2025-04-02

## 2025-04-02 RX ORDER — PANTOPRAZOLE SODIUM 40 MG/1
40 TABLET, DELAYED RELEASE ORAL
Status: DISCONTINUED | OUTPATIENT
Start: 2025-04-03 | End: 2025-04-03 | Stop reason: HOSPADM

## 2025-04-02 RX ORDER — IBUPROFEN 600 MG/1
600 TABLET, FILM COATED ORAL EVERY 6 HOURS PRN
Status: DISCONTINUED | OUTPATIENT
Start: 2025-04-02 | End: 2025-04-02 | Stop reason: HOSPADM

## 2025-04-02 RX ORDER — DEXTROSE MONOHYDRATE 25 G/50ML
25 INJECTION, SOLUTION INTRAVENOUS
Status: DISCONTINUED | OUTPATIENT
Start: 2025-04-02 | End: 2025-04-03 | Stop reason: HOSPADM

## 2025-04-02 RX ORDER — GLYCOPYRROLATE 0.2 MG/ML
INJECTION INTRAMUSCULAR; INTRAVENOUS AS NEEDED
Status: DISCONTINUED | OUTPATIENT
Start: 2025-04-02 | End: 2025-04-02 | Stop reason: SURG

## 2025-04-02 RX ORDER — SODIUM CHLORIDE 0.9 % (FLUSH) 0.9 %
10 SYRINGE (ML) INJECTION AS NEEDED
Status: DISCONTINUED | OUTPATIENT
Start: 2025-04-02 | End: 2025-04-03 | Stop reason: HOSPADM

## 2025-04-02 RX ORDER — INSULIN LISPRO 100 [IU]/ML
2-7 INJECTION, SOLUTION INTRAVENOUS; SUBCUTANEOUS
Status: DISCONTINUED | OUTPATIENT
Start: 2025-04-02 | End: 2025-04-03 | Stop reason: HOSPADM

## 2025-04-02 RX ORDER — GABAPENTIN 300 MG/1
600 CAPSULE ORAL EVERY 8 HOURS SCHEDULED
Status: DISCONTINUED | OUTPATIENT
Start: 2025-04-02 | End: 2025-04-03 | Stop reason: HOSPADM

## 2025-04-02 RX ORDER — MIDAZOLAM HYDROCHLORIDE 2 MG/2ML
0.5 INJECTION, SOLUTION INTRAMUSCULAR; INTRAVENOUS ONCE
Status: COMPLETED | OUTPATIENT
Start: 2025-04-02 | End: 2025-04-02

## 2025-04-02 RX ORDER — ONDANSETRON 2 MG/ML
4 INJECTION INTRAMUSCULAR; INTRAVENOUS EVERY 6 HOURS PRN
Status: DISCONTINUED | OUTPATIENT
Start: 2025-04-02 | End: 2025-04-02

## 2025-04-02 RX ADMIN — HYDROCODONE BITARTRATE AND ACETAMINOPHEN 1 TABLET: 5; 325 TABLET ORAL at 13:44

## 2025-04-02 RX ADMIN — ROCURONIUM BROMIDE 50 MG: 10 INJECTION, SOLUTION INTRAVENOUS at 10:38

## 2025-04-02 RX ADMIN — EPHEDRINE SULFATE 5 MG: 50 INJECTION INTRAVENOUS at 10:56

## 2025-04-02 RX ADMIN — SODIUM CHLORIDE 2000 MG: 9 INJECTION, SOLUTION INTRAVENOUS at 10:40

## 2025-04-02 RX ADMIN — BACLOFEN 10 MG: 10 TABLET ORAL at 16:58

## 2025-04-02 RX ADMIN — Medication 10 ML: at 20:41

## 2025-04-02 RX ADMIN — HYDROMORPHONE HYDROCHLORIDE 0.5 MG: 1 INJECTION, SOLUTION INTRAMUSCULAR; INTRAVENOUS; SUBCUTANEOUS at 12:29

## 2025-04-02 RX ADMIN — PROPOFOL 170 MG: 10 INJECTION, EMULSION INTRAVENOUS at 10:37

## 2025-04-02 RX ADMIN — ONDANSETRON 4 MG: 2 INJECTION INTRAMUSCULAR; INTRAVENOUS at 11:33

## 2025-04-02 RX ADMIN — ROCURONIUM BROMIDE 30 MG: 10 INJECTION, SOLUTION INTRAVENOUS at 11:16

## 2025-04-02 RX ADMIN — ALBUTEROL SULFATE 2.5 MG: 2.5 SOLUTION RESPIRATORY (INHALATION) at 13:13

## 2025-04-02 RX ADMIN — HYDROMORPHONE HYDROCHLORIDE 0.5 MG: 1 INJECTION, SOLUTION INTRAMUSCULAR; INTRAVENOUS; SUBCUTANEOUS at 13:45

## 2025-04-02 RX ADMIN — HYDROCHLOROTHIAZIDE 12.5 MG: 12.5 TABLET ORAL at 16:58

## 2025-04-02 RX ADMIN — OXYCODONE HYDROCHLORIDE 5 MG: 5 TABLET ORAL at 15:19

## 2025-04-02 RX ADMIN — DEXAMETHASONE SODIUM PHOSPHATE 4 MG: 4 INJECTION, SOLUTION INTRAMUSCULAR; INTRAVENOUS at 10:31

## 2025-04-02 RX ADMIN — TEMAZEPAM 15 MG: 15 CAPSULE ORAL at 21:01

## 2025-04-02 RX ADMIN — ACETAMINOPHEN 1000 MG: 500 TABLET, FILM COATED ORAL at 08:39

## 2025-04-02 RX ADMIN — EPHEDRINE SULFATE 10 MG: 50 INJECTION INTRAVENOUS at 10:54

## 2025-04-02 RX ADMIN — GABAPENTIN 600 MG: 300 CAPSULE ORAL at 20:40

## 2025-04-02 RX ADMIN — SODIUM CHLORIDE, SODIUM LACTATE, POTASSIUM CHLORIDE, CALCIUM CHLORIDE 9 ML/HR: 20; 30; 600; 310 INJECTION, SOLUTION INTRAVENOUS at 08:39

## 2025-04-02 RX ADMIN — INSULIN LISPRO 3 UNITS: 100 INJECTION, SOLUTION INTRAVENOUS; SUBCUTANEOUS at 17:54

## 2025-04-02 RX ADMIN — Medication 200 MCG: at 10:56

## 2025-04-02 RX ADMIN — CELECOXIB 200 MG: 100 CAPSULE ORAL at 08:39

## 2025-04-02 RX ADMIN — SODIUM CHLORIDE 50 ML/HR: 9 INJECTION, SOLUTION INTRAVENOUS at 17:02

## 2025-04-02 RX ADMIN — OXYCODONE HYDROCHLORIDE 5 MG: 5 TABLET ORAL at 12:39

## 2025-04-02 RX ADMIN — BACLOFEN 10 MG: 10 TABLET ORAL at 20:38

## 2025-04-02 RX ADMIN — SUGAMMADEX 200 MG: 100 INJECTION, SOLUTION INTRAVENOUS at 11:44

## 2025-04-02 RX ADMIN — LOSARTAN POTASSIUM 100 MG: 50 TABLET, FILM COATED ORAL at 16:58

## 2025-04-02 RX ADMIN — Medication 10 ML: at 16:59

## 2025-04-02 RX ADMIN — ATORVASTATIN CALCIUM 80 MG: 40 TABLET, FILM COATED ORAL at 20:38

## 2025-04-02 RX ADMIN — FENTANYL CITRATE 100 MCG: 50 INJECTION, SOLUTION INTRAMUSCULAR; INTRAVENOUS at 10:36

## 2025-04-02 RX ADMIN — KETOROLAC TROMETHAMINE 15 MG: 30 INJECTION, SOLUTION INTRAMUSCULAR; INTRAVENOUS at 11:38

## 2025-04-02 RX ADMIN — HYDROMORPHONE HYDROCHLORIDE 0.25 MG: 1 INJECTION, SOLUTION INTRAMUSCULAR; INTRAVENOUS; SUBCUTANEOUS at 15:19

## 2025-04-02 RX ADMIN — MORPHINE SULFATE 2 MG: 2 INJECTION, SOLUTION INTRAMUSCULAR; INTRAVENOUS at 16:58

## 2025-04-02 RX ADMIN — SODIUM CHLORIDE, POTASSIUM CHLORIDE, SODIUM LACTATE AND CALCIUM CHLORIDE: 600; 310; 30; 20 INJECTION, SOLUTION INTRAVENOUS at 10:30

## 2025-04-02 RX ADMIN — MIDAZOLAM HYDROCHLORIDE 0.5 MG: 1 INJECTION, SOLUTION INTRAMUSCULAR; INTRAVENOUS at 10:26

## 2025-04-02 RX ADMIN — KETOROLAC TROMETHAMINE 15 MG: 30 INJECTION, SOLUTION INTRAMUSCULAR; INTRAVENOUS at 12:53

## 2025-04-02 RX ADMIN — HYDROMORPHONE HYDROCHLORIDE 0.5 MG: 1 INJECTION, SOLUTION INTRAMUSCULAR; INTRAVENOUS; SUBCUTANEOUS at 12:11

## 2025-04-02 RX ADMIN — Medication 200 MCG: at 10:51

## 2025-04-02 RX ADMIN — INSULIN LISPRO 3 UNITS: 100 INJECTION, SOLUTION INTRAVENOUS; SUBCUTANEOUS at 20:38

## 2025-04-02 RX ADMIN — LIDOCAINE HYDROCHLORIDE 80 MG: 20 INJECTION, SOLUTION EPIDURAL; INFILTRATION; INTRACAUDAL; PERINEURAL at 10:36

## 2025-04-02 RX ADMIN — GLYCOPYRROLATE 0.1 MG: 0.2 INJECTION INTRAMUSCULAR; INTRAVENOUS at 10:36

## 2025-04-02 RX ADMIN — MORPHINE SULFATE 2 MG: 2 INJECTION, SOLUTION INTRAMUSCULAR; INTRAVENOUS at 22:58

## 2025-04-02 RX ADMIN — MORPHINE SULFATE 2 MG: 2 INJECTION, SOLUTION INTRAMUSCULAR; INTRAVENOUS at 20:37

## 2025-04-02 NOTE — ANESTHESIA PREPROCEDURE EVALUATION
Anesthesia Evaluation     Patient summary reviewed and Nursing notes reviewed   no history of anesthetic complications:   NPO Solid Status: > 8 hours  NPO Liquid Status: > 2 hours           Airway   Mallampati: III  TM distance: >3 FB  Neck ROM: full  No difficulty expected  Dental      Pulmonary - negative pulmonary ROS and normal exam    breath sounds clear to auscultation  Cardiovascular - normal exam  Exercise tolerance: good (4-7 METS)    ECG reviewed  Rhythm: regular  Rate: normal    (+) hypertension, past MI , CHF , DVT, hyperlipidemia      Neuro/Psych  (+) CVA, numbness, psychiatric history  GI/Hepatic/Renal/Endo    (+) GERD, diabetes mellitus type 2    Musculoskeletal     Abdominal    Substance History - negative use     OB/GYN negative ob/gyn ROS         Other   arthritis,     ROS/Med Hx Other:  ECG -  Sinus rhythm  RBBB and LAFB  ST elevation, consider inferior injury  When compared with ECG of 18-Oct-2024 14:15:36,  No significant change  Electronically Signed By: Ty Rizvi ([facility]) 2024-10-19 16:25:53  Date and Time of Study:2024-10-19 01:05:58    ECHO 2/2/24  ·  Left ventricular systolic function is normal. Left ventricular ejection fraction appears to be 56 - 60%.  ·  Left ventricular diastolic function is consistent with (grade I) impaired relaxation.  ·  The right ventricular cavity is mild to moderately dilated, with a normal systolic function.  ·  There are no significant valvular abnormalities.  ·  Unable to calculate pulmonary artery systolic pressure due to incomplete TR Doppler envelope.    Cath 02/23 nl coronary arteries, hx of cva with left side weakness(2006), dm, hx of dvt.  EKG nsr, supraventricular bigeminy rbbb    ABNORMAL ECG - 5/1/24  Sinus rhythm  Supraventricular bigeminy  Right bundle branch block  Minimal ST elevation, inferior leads  When compared with ECG of 02-Feb-2024 10:45:21,  No significant change                    Anesthesia Plan    ASA 3     general     (Patient  understands anesthesia not responsible for dental damage.)  intravenous induction     Anesthetic plan, risks, benefits, and alternatives have been provided, discussed and informed consent has been obtained with: patient.  Pre-procedure education provided  Use of blood products discussed with patient .        CODE STATUS:

## 2025-04-02 NOTE — OP NOTE
INGUINAL HERNIA REPAIR LAPAROSCOPIC WITH DAVINCI ROBOT  Procedure Report    Patient Name:  Atilio Olson  YOB: 1950    Date of Surgery:  4/2/2025     Indications: The patient is a 74-year-old gentleman that presented with a symptomatic left inguinal hernia.  The decision was made to proceed with a robotic left inguinal hernia repair.    Pre-op Diagnosis: Left inguinal hernia    Post-Op Diagnosis: Same    Procedure/CPT® Codes:    Robotic left inguinal hernia repair    Staff:  Surgeon(s):  Wilder Wiggins MD    Assistant: Pb Staley    Anesthesia: General    Estimated Blood Loss: 10 mL    Implants:    Implant Name Type Inv. Item Serial No.  Lot No. LRB No. Used Action   DEV CLS WND VLOC/180 CHAO ABS 1/2CIR SZ2/0 15CM 27MM GRN - [id number] Implant DEV CLS WND VLOC/180 CHAO ABS 1/2CIR SZ2/0 15CM 27MM GRN  COVIDIEN [id number] Left 1 Implanted   MESH PROGRIP LAP S/FIXATING [id number] - [id number] Implant MESH PROGRIP LAP S/FIXATING [id number]  COVIDIEN [id number] Left 1 Implanted       Specimen:          None        Findings: Pantaloon type left inguinal hernia with direct and indirect hernia sacs noted    Complications: None    Description of Procedure:  The patient was taken to the operating room and placed on the table in supine position.  After induction of general endotracheal anesthesia, the abdomen was insufflated with a Veress needle placed in the left upper abdomen.  A 12 mm left upper quadrant port was placed into the peritoneal cavity using a Visiport.  Three 8 mm da Annetta robotic trocars were then placed into the peritoneal cavity above the umbilicus under direct vision.  The da Annetta robot was brought to the table and the robotic arms were docked to our trocars.  The camera and instruments were then placed into the peritoneal cavity under direct vision and we identified a pantaloon type left inguinal hernia with no obvious right inguinal hernia.  I incised the peritoneum  superior to the left inguinal canal with cautery scissors and began to dissect the peritoneum down from the anterior abdominal wall using cautery scissors.  We dissected down into the left inguinal canal and dissected medially first and identified Gideon's ligament.  We identified the direct sac and completely dissected this free and reduced it.  I then dissected over the peritoneum laterally and created a large pocket for mesh placement.  We identified the indirect inguinal hernia sac and this was carefully dissected away from the cord structures and completely dissected free and reduced.  I then dissected the peritoneum back further posteriorly off of the vessels and then took a 10 x 15 cm piece of ProGrip hernia mesh and placed into the preperitoneal position.  Once the mesh was fixated in place, we appeared to have good coverage of the direct and indirect hernia spaces and we appeared to have good hemostasis.  I then closed the peritoneum with a running 2-0 absorbable V-Loc suture.  The bowel in the lower abdomen looked fine.  The instruments and camera were then removed and the robotic arms were undocked from the trocars.  The 12 mm port site was then closed with a Jacek-Betsy closure device and a 0 Mersilene tie.  The abdomen was desufflated and the other ports were removed.  All skin incisions were closed with 4-0 Vicryl subcuticular sutures and sterile dressings were applied.  The patient was then taken to the postanesthesia recovery room in stable condition.      Assistant: Pb Staley  was responsible for performing the following activities: Retraction, Placing Dressing, and Held/Positioned Camera and their skilled assistance was necessary for the success of this case.    Wilder Wiggins MD     Date: 4/2/2025  Time: 11:45 EDT

## 2025-04-02 NOTE — PLAN OF CARE
Goal Outcome Evaluation:  Plan of Care Reviewed With: patient        Progress: no change  Outcome Evaluation: Patient s/p hernia repair; sites clean and dry. VSS.

## 2025-04-02 NOTE — DISCHARGE INSTRUCTIONS
DISCHARGE INSTRUCTIONS  HERNIA      For your surgery you had:  General anesthesia (you may have a sore throat for the first 24 hours)  IV sedation.  Local anesthesia  Monitored anesthesia care  You received a medicated patch for nausea prevention today (behind your ear). It is recommended that you remove it 24-48 hours post-operatively. It must be removed within 72 hours.   You received an anesthesia medication today that can cause hormonal forms of birth control to be ineffective. You should use a different form of birth control (to prevent pregnancy) for 7 days.  You may experience dizziness, drowsiness, or light-headedness for several hours following surgery/procedure.  Do not stay alone today or tonight.  Limit your activity for 24 hours.  Resume your diet slowly.  Follow whatever special dietary instructions you may have been given by your doctor.  You should not drive, operate machinery, drink alcohol, or sign legally binding documents for 24 hours or while you are taking pain medication.  NOTIFY YOUR DOCTOR IF YOU EXPERIENCE ANY OF THE FOLLOWING:  Temperature greater than 101 degrees Fahrenheit  Shaking Chills  Redness or excessive drainage from incision  Nausea, vomiting and/or pain that is not controlled by prescribed medications  Increase in bleeding or bleeding that is excessive  Unable to urinate in 6 hours after surgery  If unable to reach your doctor, please go to the closest Emergency Room [] You may remove dressing:   [] in 24 hours   [] in 48 hours   [] Skin adhesive flakes off in 10-14 days.   [] You may shower ___, no submerging of incisions for 2 weeks.  [] Use abdominal binder every day for two weeks, only taking off for sleeping and showering.  [] Wear a jockey support or tight fitting briefs to prevent swelling.    Do not do any heavy lifting, pushing or pulling.  You may walk up and down stairs.  You may ride in a car but do not drive until instructed by your physician.  Avoid  constipation.  Apply an ice pack for 24-48 hours  If unable to urinate in 6 to 8 hours after surgery or urinating frequently in small amounts, notify your doctor or go to the nearest Emergency Room.  Medications per physician instructions as indicated on Discharge Medication Information Sheet.    Last dose of pain medication was given at:     .    SPECIAL INSTRUCTIONS:

## 2025-04-02 NOTE — NURSING NOTE
Ambulated pt in hallway aprox. 150 ft. Pt O2 dropped to 72% on room air. Placed back in recliner on on 02. Pt maintaining 90% O2. Notified anesthesia.   Pt stable and comfortable at this time. Will continue to monitor.

## 2025-04-03 VITALS
HEART RATE: 73 BPM | OXYGEN SATURATION: 90 % | HEIGHT: 64 IN | DIASTOLIC BLOOD PRESSURE: 57 MMHG | WEIGHT: 164.9 LBS | TEMPERATURE: 97.7 F | RESPIRATION RATE: 16 BRPM | BODY MASS INDEX: 28.15 KG/M2 | SYSTOLIC BLOOD PRESSURE: 118 MMHG

## 2025-04-03 LAB — GLUCOSE BLDC GLUCOMTR-MCNC: 181 MG/DL (ref 70–99)

## 2025-04-03 PROCEDURE — A9270 NON-COVERED ITEM OR SERVICE: HCPCS | Performed by: NURSE PRACTITIONER

## 2025-04-03 PROCEDURE — 25010000002 KETOROLAC TROMETHAMINE PER 15 MG: Performed by: SURGERY

## 2025-04-03 PROCEDURE — A9270 NON-COVERED ITEM OR SERVICE: HCPCS | Performed by: SURGERY

## 2025-04-03 PROCEDURE — 82948 REAGENT STRIP/BLOOD GLUCOSE: CPT | Performed by: NURSE PRACTITIONER

## 2025-04-03 PROCEDURE — 63710000001 PANTOPRAZOLE 40 MG TABLET DELAYED-RELEASE: Performed by: NURSE PRACTITIONER

## 2025-04-03 PROCEDURE — 63710000001 HYDROCODONE-ACETAMINOPHEN 5-325 MG TABLET: Performed by: NURSE PRACTITIONER

## 2025-04-03 PROCEDURE — 63710000001 BACLOFEN 10 MG TABLET: Performed by: NURSE PRACTITIONER

## 2025-04-03 PROCEDURE — 63710000001 INSULIN LISPRO (HUMAN) PER 5 UNITS: Performed by: NURSE PRACTITIONER

## 2025-04-03 PROCEDURE — 63710000001 GABAPENTIN 300 MG CAPSULE: Performed by: SURGERY

## 2025-04-03 PROCEDURE — 99024 POSTOP FOLLOW-UP VISIT: CPT | Performed by: NURSE PRACTITIONER

## 2025-04-03 RX ORDER — KETOROLAC TROMETHAMINE 30 MG/ML
15 INJECTION, SOLUTION INTRAMUSCULAR; INTRAVENOUS ONCE
Status: COMPLETED | OUTPATIENT
Start: 2025-04-03 | End: 2025-04-03

## 2025-04-03 RX ORDER — TRAMADOL HYDROCHLORIDE 50 MG/1
50 TABLET ORAL EVERY 6 HOURS PRN
Status: DISCONTINUED | OUTPATIENT
Start: 2025-04-03 | End: 2025-04-03 | Stop reason: HOSPADM

## 2025-04-03 RX ADMIN — Medication 10 ML: at 08:02

## 2025-04-03 RX ADMIN — INSULIN LISPRO 2 UNITS: 100 INJECTION, SOLUTION INTRAVENOUS; SUBCUTANEOUS at 08:49

## 2025-04-03 RX ADMIN — HYDROCODONE BITARTRATE AND ACETAMINOPHEN 1 TABLET: 5; 325 TABLET ORAL at 02:09

## 2025-04-03 RX ADMIN — PANTOPRAZOLE SODIUM 40 MG: 40 TABLET, DELAYED RELEASE ORAL at 05:33

## 2025-04-03 RX ADMIN — BACLOFEN 10 MG: 10 TABLET ORAL at 08:02

## 2025-04-03 RX ADMIN — KETOROLAC TROMETHAMINE 15 MG: 30 INJECTION, SOLUTION INTRAMUSCULAR; INTRAVENOUS at 03:06

## 2025-04-03 RX ADMIN — GABAPENTIN 600 MG: 300 CAPSULE ORAL at 05:33

## 2025-04-03 RX ADMIN — KETOROLAC TROMETHAMINE 15 MG: 30 INJECTION, SOLUTION INTRAMUSCULAR; INTRAVENOUS at 05:33

## 2025-04-03 NOTE — PLAN OF CARE
Goal Outcome Evaluation:  Plan of Care Reviewed With: patient        Progress: improving  Outcome Evaluation: VSS. Medicated with morphine and norco with minimal pain relief. Dr. Wiggins notified and recieved order for toradol x1. Pain was at a comfortable level afterwards. Titrated to room air. Encourgaed IS. Minimal marked drainage on lap sites. BGL monitored. Insulin given per sliding scale. Voiding in urinal.    Pt complained of pain again this morning. Called Dr. Wiggins and received order for one time dose of Toradol again.

## 2025-04-03 NOTE — NURSING NOTE
Weaned off of oxygen, tolerating regular diet, up standby in room, discharge teaching complete, prepare to d/c home for self care.

## 2025-04-03 NOTE — PROGRESS NOTES
"POST OP PROGRESS NOTE     Patient Name:  Atilio Olson  YOB: 1950  [id number]   LOS: 0 days   1 Day Post-Op            Subjective     Interval History:   VSS, afebrile, tolerating diet, oxygen sats 90-92% on room air    Review of Systems:    A complete review of systems was performed and all are negative except what is documented in the HPI.       Objective     Constitutional:  well nourished, no acute distress, appears stated age /58   Pulse 73   Temp 97.7 °F (36.5 °C)   Resp 16   Ht 162.6 cm (64\")   Wt 74.8 kg (164 lb 14.5 oz)   SpO2 90%   BMI 28.31 kg/m²    Eyes:  anicteric sclerae, moist conjunctivae, no lid lag, PERRLA  ENMT:  oropharynx clear, moist mucous membranes  Neck:   full ROM, trachea midline  Cardiovascular:  heart rate 73, no pedal edema  Respiratory:  respirations even and unlabored  GI:  Abdomen soft, approp tender,      Skin:  warm and dry, normal turgor, no rashes  Psychiatric:  alert and oriented x 4,  cooperative          Results Review:       I reviewed the patient's new clinical results including  no labs.     No results found for: \"WBC\", \"RBC\", \"HGB\", \"HCT\", \"MCV\", \"MCH\", \"MCHC\", \"RDW\", \"RDWSD\", \"MPV\", \"PLT\", \"NEUTRORELPCT\", \"LYMPHORELPCT\", \"MONORELPCT\", \"EOSRELPCT\", \"BASORELPCT\", \"AUTOIGPER\", \"NEUTROABS\", \"LYMPHSABS\", \"MONOSABS\", \"EOSABS\", \"BASOSABS\", \"AUTOIGNUM\", \"NRBC\"      Basic Metabolic Panel    Sodium Sodium   Date Value Ref Range Status   04/02/2025 134 (L) 136 - 145 mmol/L Final      Potassium Potassium   Date Value Ref Range Status   04/02/2025 4.7 3.5 - 5.2 mmol/L Final     Comment:     Slight hemolysis detected by analyzer. Result may be falsely elevated.      Chloride Chloride   Date Value Ref Range Status   04/02/2025 100 98 - 107 mmol/L Final      Bicarbonate No results found for: \"PLASMABICARB\"   BUN BUN   Date Value Ref Range Status   04/02/2025 31 (H) 8 - 23 mg/dL Final      Creatinine Creatinine   Date Value Ref Range Status   04/02/2025 1.20 " "0.76 - 1.27 mg/dL Final      Calcium Calcium   Date Value Ref Range Status   04/02/2025 9.6 8.6 - 10.5 mg/dL Final      Glucose      No components found for: \"GLUCOSE.*\"       Lab Results   Component Value Date    GLUCOSE 177 (H) 04/02/2025    BUN 31 (H) 04/02/2025    CREATININE 1.20 04/02/2025     (L) 04/02/2025    K 4.7 04/02/2025     04/02/2025    CALCIUM 9.6 04/02/2025    PROTEINTOT 6.4 02/15/2024    ALBUMIN 3.9 02/15/2024    ALT 28 02/15/2024    AST 18 02/15/2024    ALKPHOS 118 (H) 02/15/2024    BILITOT 0.2 02/15/2024    GLOB 2.5 02/15/2024    AGRATIO 1.6 02/15/2024    BCR 25.8 (H) 04/02/2025    ANIONGAP 10.1 04/02/2025    EGFR 63.9 04/02/2025       IMAGING:  Imaging Results (Last 72 Hours)       ** No results found for the last 72 hours. **            Medications:    Current Facility-Administered Medications:     acetaminophen (TYLENOL) tablet 650 mg, 650 mg, Oral, Q4H PRN, Mary Downing, WANDA    atorvastatin (LIPITOR) tablet 80 mg, 80 mg, Oral, Nightly, Mary Downing, APRN, 80 mg at 04/02/25 2038    baclofen (LIORESAL) tablet 10 mg, 10 mg, Oral, TID, Mary Downing, APRN, 10 mg at 04/03/25 0802    sennosides-docusate (PERICOLACE) 8.6-50 MG per tablet 2 tablet, 2 tablet, Oral, BID PRN **AND** polyethylene glycol (MIRALAX) packet 17 g, 17 g, Oral, Daily PRN **AND** bisacodyl (DULCOLAX) EC tablet 5 mg, 5 mg, Oral, Daily PRN **AND** bisacodyl (DULCOLAX) suppository 10 mg, 10 mg, Rectal, Daily PRN, Mary Downing, APRN    dextrose (D50W) (25 g/50 mL) IV injection 25 g, 25 g, Intravenous, Q15 Min PRN, Mary Downing APRN    dextrose (GLUTOSE) oral gel 15 g, 15 g, Oral, Q15 Min PRN, Mary Downing APRN    gabapentin (NEURONTIN) capsule 600 mg, 600 mg, Oral, Q8H, Wilder Wiggins MD, 600 mg at 04/03/25 0533    glucagon (GLUCAGEN) injection 1 mg, 1 mg, Intramuscular, Q15 Min PRN, Mary Downing APRN    hydroCHLOROthiazide tablet 12.5 mg, 12.5 mg, Oral, Q PM, 12.5 mg at " 04/02/25 1658 **AND** losartan (COZAAR) tablet 100 mg, 100 mg, Oral, Q PM, Wilder Wiggins MD, 100 mg at 04/02/25 1658    Insulin Lispro (humaLOG) injection 2-7 Units, 2-7 Units, Subcutaneous, 4x Daily AC & at Bedtime, Mary Downing APRN, 2 Units at 04/03/25 0849    morphine injection 2 mg, 2 mg, Intravenous, Q2H PRN, 2 mg at 04/02/25 2258 **AND** naloxone (NARCAN) injection 0.4 mg, 0.4 mg, Intravenous, Q5 Min PRN, Mary Downing APRN    pantoprazole (PROTONIX) EC tablet 40 mg, 40 mg, Oral, Q AM, Mary Downing APRN, 40 mg at 04/03/25 0533    sodium chloride 0.9 % flush 10 mL, 10 mL, Intravenous, Q12H, Mary Downing APRN, 10 mL at 04/03/25 0802    sodium chloride 0.9 % flush 10 mL, 10 mL, Intravenous, PRN, Mary Downing, WANDA    sodium chloride 0.9 % infusion 40 mL, 40 mL, Intravenous, PRN, Mary Downing APRN    temazepam (RESTORIL) capsule 15 mg, 15 mg, Oral, Nightly PRN, Wilder Wiggins MD, 15 mg at 04/02/25 2101    traMADol (ULTRAM) tablet 50 mg, 50 mg, Oral, Q6H PRN, Mary Downing APRN    Assessment & Plan       Left inguinal hernia  DC home          Electronically signed by WANDA Carbajal, 04/03/25, 8:50 AM EDT.

## 2025-04-03 NOTE — DISCHARGE SUMMARY
Date of Admission: 4/2/2025    Date of Discharge:  4/3/2025    Discharge Diagnosis:   Left inguinal hernia [K40.90]  Post-Op Diagnosis Codes:     * Left inguinal hernia [K40.90]       Presenting Problem/History of Present Illness  Active Hospital Problems    Diagnosis  POA    **Left inguinal hernia [K40.90]  Unknown      Resolved Hospital Problems   No resolved problems to display.          Hospital Course  Atilio Olson is a 74 y.o. male presented to [facility] for a planned robotic left inguinal hernia repair.  He was admitted after the procedure for routine postop care.   Upon discharge his tolerating a diet and his pain is controlled.      Procedures Performed    Procedure(s):  Robotic left inguinal hernia repair-repair left groin hernia with small incisions, camera and robotic instruments  -------------------       Consults:   Consults       No orders found for last 30 day(s).            Condition on Discharge:  stable    Vital Signs  Temp:  [97.1 °F (36.2 °C)-97.9 °F (36.6 °C)] 97.7 °F (36.5 °C)  Heart Rate:  [66-98] 73  Resp:  [13-20] 16  BP: ()/(40-72) 118/57       Discharge Disposition  Home or Self Care    Discharge Medications     Discharge Medications        New Medications        Instructions Start Date   HYDROcodone-acetaminophen 5-325 MG per tablet  Commonly known as: NORCO   1 tablet, Oral, Every 6 Hours PRN             Continue These Medications        Instructions Start Date   aspirin 81 MG EC tablet   81 mg, Oral, Daily, If urine clearing and not passing excessive clot      atorvastatin 80 MG tablet  Commonly known as: LIPITOR   80 mg, Nightly      baclofen 10 MG tablet  Commonly known as: LIORESAL   10 mg, 3 Times Daily      clopidogrel 75 MG tablet  Commonly known as: PLAVIX   75 mg, Oral, Daily, If urine clearing and not passing excessive clot      FOLBEE PLUS PO   1 tablet, Daily      gabapentin 800 MG tablet  Commonly known as: NEURONTIN   800 mg, 3 Times Daily      Loratadine 10 MG  capsule   10 mg, Daily      losartan-HCTZ (HYZAAR) 100-12.5 combo dose   1 dose, Every Evening      metFORMIN 500 MG tablet  Commonly known as: GLUCOPHAGE   Take 1 tablet by mouth 2 (Two) Times a Day With Meals.      multivitamin with minerals tablet tablet   1 tablet, Daily      oxaprozin 600 MG tablet  Commonly known as: Daypro   600 mg, Oral, 2 Times Daily      pantoprazole 20 MG EC tablet  Commonly known as: PROTONIX   20 mg, Daily      temazepam 15 MG capsule  Commonly known as: RESTORIL   15 mg, Nightly PRN      traMADol 50 MG tablet  Commonly known as: ULTRAM   50 mg, Every 8 Hours PRN               Discharge Diet:     Activity at Discharge:   Activity Instructions       Activity as Tolerated              Follow-up Appointments  Future Appointments   Date Time Provider Department Center   4/8/2025  9:15 AM Rupinder Hankins MD [facility]   5/27/2025  9:00 AM Rupinder Hankins MD [facility]     Additional Instructions for the Follow-ups that You Need to Schedule       Discharge Follow-up with Specified Provider: Dr. Wiggins; 2 Weeks   As directed      To: Dr. Wiggins   Follow Up: 2 Weeks        Notify Physician or Go To The ED For the Following Conditions   As directed      -Severe Pain  -Excessive Bleeding  -Fever over 102    Order Comments: -Severe Pain -Excessive Bleeding -Fever over 102                 Test Results Pending at Discharge       WANDA Carbajal  04/03/25  08:51 EDT        Electronically signed by WANDA Carbajal, 04/03/25, 8:51 AM EDT.

## 2025-04-03 NOTE — ANESTHESIA POSTPROCEDURE EVALUATION
Patient: Atilio Olson    Procedure Summary       Date: 04/02/25 Room / Location: [facility] OR 11 / [facility] MAIN OR    Anesthesia Start: 1030 Anesthesia Stop: 1151    Procedure: Robotic left inguinal hernia repair-repair left groin hernia with small incisions, camera and robotic instruments (Left: Abdomen) Diagnosis:       Left inguinal hernia      (Left inguinal hernia [K40.90])    Surgeons: Wilder Wiggins MD Provider: Marshall Fish MD    Anesthesia Type: general ASA Status: 3            Anesthesia Type: general    Vitals  Vitals Value Taken Time   /65 04/02/25 15:50   Temp 36.3 °C (97.3 °F) 04/02/25 15:30   Pulse 82 04/02/25 15:47   Resp 16 04/02/25 15:50   SpO2 93 % 04/02/25 15:47   Vitals shown include unfiled device data.        Post Anesthesia Care and Evaluation    Patient location during evaluation: bedside  Patient participation: complete - patient participated  Level of consciousness: awake  Pain management: adequate    Airway patency: patent  PONV Status: none  Cardiovascular status: acceptable and stable  Respiratory status: acceptable  Hydration status: acceptable

## 2025-04-08 ENCOUNTER — OFFICE VISIT (OUTPATIENT)
Dept: UROLOGY | Age: 75
End: 2025-04-08
Payer: MEDICARE

## 2025-04-08 DIAGNOSIS — N50.812 PAIN IN LEFT TESTICLE: Primary | ICD-10-CM

## 2025-04-08 DIAGNOSIS — N45.1 EPIDIDYMITIS: ICD-10-CM

## 2025-04-08 DIAGNOSIS — N40.1 BENIGN PROSTATIC HYPERPLASIA WITH WEAK URINARY STREAM: ICD-10-CM

## 2025-04-08 DIAGNOSIS — R39.12 BENIGN PROSTATIC HYPERPLASIA WITH WEAK URINARY STREAM: ICD-10-CM

## 2025-04-08 LAB — URINE VOLUME: 0

## 2025-04-08 NOTE — PROGRESS NOTES
UROLOGY OFFICE FOLLOW UP NOTE    Subjective   HPI  Atilio Olson is a 74 y.o. male. Presents for postop follow-up cystoscopy with urethral dilation, right epididymectomy with cyst excision , 10/18/2024.   Taught how to perform intermittent catheterization of his glans to maintain patency at last visit.  Presents for repeat PVR.     History of Present Illness  He reports an improvement in his urinary symptoms. However, he experiences a sensation akin to being kicked in the right side of his scrotum.   He is not currently taking any medication for his prostate.      He performs self-catheterization just of the glans approximately once every 10 days, which he finds painful. He has an adequate supply of catheters at home.           Update 3/18/2025: Presents for requested follow-up secondary to testicular pain, bilateral.  Had ultrasound ordered by PCP prior to today's visit.  History of Present Illness  He reports experiencing left-sided testicular discomfort, which has been progressively worsening over the past 3 weeks. He describes a sensation of swelling in the affected area. Despite being on amoxicillin for a dental issue for the last 10 days, there has been no improvement in his testicular symptoms.   He is not experiencing any urinary symptoms such as dysuria or hematuria and reports satisfactory urinary flow. He expresses satisfaction with his outlet procedure. He is currently on tramadol for pain management.       Update 4/8/2025: Presents for follow-up left orchialgia, treated for epididymitis status post left inguinal hernia repair with Dr. Wiggins 4/2/2025.  History of Present Illness  He reports persistent left testicular discomfort despite his hernia repair, treatment course of epididymitis with antibiotic and Daypro, with no significant change in the left-sided pain. He experiences consistent pain in his left testicle, which has not been alleviated by the prescribed antibiotic and anti-inflammatory  medications.     His urinary function remains normal, and he does not experience any pain on the right side.        ________________  Scrotal ultrasound 3/8/2025: Asymmetrically smaller right testicle which is decreased in size from the prior outside ultrasound dated 9/9/2024. There is an area of slightly decreased echogenicity which appears to correspond to the lower pole of the right testicle measuring 2.2 x   1.7 x 1.9 cm. This likely represents sequelae of prior infection or surgery. An underlying testicular mass is also within the differential considerations but felt slightly less likely given the overall decrease in size of the right testicle. Recommend   urology follow-up.  2.Normal appearance of the left testicle.  3.Debris-filled left-sided hydrocele.     Epididymis pathology: - Benign epididymis and epididymal cysts      scrotal ultrasound 9/9/2024: Cystic replacement of the epididymal head on the right   with the largest of the cyst measuring 3.1 cm maximally. Bilateral   hydroceles, slightly more complex on the right than left. No   intratesticular abnormalities.    _____________  Prostate pathology, aqua ablation 5/6/2024: Benign prostatic hyperplasia     ______________  Uroflow performed, consistent with obstructive uropathy, flow rate max 5 mL/s.     IPSS performed reveals severe bother score of 29.     CT abdomen pelvis with contrast 2/15/2024 performed prior to this visit.  Stable left rigid lip adenoma; right renal cyst, enlarged prostate gland; no urinary bladder thickening.  Fat-containing left inguinal hernia.  Prostate gland measured and calculated out to 110 g approximately.     Office cystoscopy 2/27/2024: Severe prostatomegaly with bilateral coapting lateral lobes, some varicosities, elongated prostatic urethra; no median lobe or significant intravesical prostate tissue.  Cystoscopy revealed normal capacity bladder which was decompressed upon entry, one right and left ureteral orifice in the  normal anatomic position, normal bladder mucosa and no tumors, masses or stones.      Results for orders placed or performed in visit on 04/08/25   Bladder Scan    Collection Time: 04/08/25  9:45 AM   Result Value Ref Range    Urine Volume 0          Review of systems  A review of systems was performed, and positive findings are noted in the HPI.    Objective     Vital Signs:   There were no vitals taken for this visit.      Physical exam  No acute distress, well-nourished  Awake alert and oriented  Mood normal; affect normal  Physical Exam  ; no change since last visit; there is mild tenderness on the posterior side of the genitourinary system, towards the epididymis.      Bladder Scan interpretation 04/08/2025    Estimation of residual urine via BVI 3000 [place] Bladder Scan  Performed by: CLAIRE Alvarenga  Residual Urine: 0 ml  Indication: Pain in left testicle    Epididymitis    Benign prostatic hyperplasia with weak urinary stream   Position: Supine  Examination: Incremental scanning of the suprapubic area using 2.0 MHz transducer using copious amounts of acoustic gel.   Findings: An anechoic area was demonstrated which represented the bladder, with measurement of residual urine as noted. I inspected this myself. In that the residual urine was stable or insignificant, refer to plan for treatment and plan necessary at this time.     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 Pain in left testicle (Primary)  -     Ambulatory Referral to Urology    2. Epididymitis    3. Benign prostatic hyperplasia with weak urinary stream  -     Bladder Scan        Assessment & Plan  1. Left-sided testicular pain.  The patient continues to experience left-sided testicular pain despite the administration of antibiotics and anti-inflammatory medication (Daypro). The pain may be related to recent hernia repair surgery. An ultrasound of the scrotum was performed in early March 2025. On examination, there is tenderness on the posterior side towards the epididymis. There is also a slight atrophy of the right testicle, likely a consequence of previous surgical intervention.     A referral to Dr. Mtz, is recommended for further evaluation and potential surgical intervention, denervation surgery. The patient is advised to allow some time for healing post-hernia repair, prior to consultation, as symptoms may still be settling.  He is agreeable to referral.    Follow-up  The patient will follow up in 1 year, PVR or earlier if necessary.       All questions addressed      Patient or patient representative verbalized consent for the use of Ambient Listening during the visit with  Rupinder Hankins MD for chart documentation. 4/12/2025  14:08 EDT

## 2025-04-15 ENCOUNTER — OFFICE VISIT (OUTPATIENT)
Dept: SURGERY | Facility: CLINIC | Age: 75
End: 2025-04-15
Payer: MEDICARE

## 2025-04-15 VITALS — BODY MASS INDEX: 28.65 KG/M2 | HEIGHT: 64 IN | WEIGHT: 167.8 LBS

## 2025-04-15 DIAGNOSIS — K40.90 LEFT INGUINAL HERNIA: Primary | ICD-10-CM

## 2025-06-17 ENCOUNTER — TELEPHONE (OUTPATIENT)
Dept: UROLOGY | Age: 75
End: 2025-06-17

## 2025-06-25 ENCOUNTER — PREP FOR SURGERY (OUTPATIENT)
Dept: OTHER | Facility: HOSPITAL | Age: 75
End: 2025-06-25
Payer: MEDICARE

## 2025-06-25 DIAGNOSIS — N50.812 PAIN IN LEFT TESTICLE: ICD-10-CM

## 2025-06-25 DIAGNOSIS — N43.3 HYDROCELE IN ADULT: Primary | ICD-10-CM

## 2025-07-16 NOTE — PRE-PROCEDURE INSTRUCTIONS
PATIENT INSTRUCTED TO BE:    - NOTHING TO EAT AFTER MIDNIGHT OR CHEW, EXCEPT CAN HAVE CLEAR LIQUIDS 2 HOURS PRIOR TO SURGERY ARRIVAL TIME , NO MORE THAN 8 OZ. (NOTHING RED)     - TO HOLD ALL VITAMINS, SUPPLEMENTS, NSAIDS FOR ONE WEEK PRIOR TO THEIR SURGICAL PROCEDURE     - INSTRUCTED PT TO USE SURGICAL SOAP 1 TIME THE NIGHT PRIOR TO SURGERY __7/17/25 OR THE AM OF SURGERY ___7/18/25__________   USE THE SOAP FROM NECK TO TOES, AVOID THEIR FACE, HAIR, AND PRIVATE PARTS. IF USE THE SOAP THE NIGHT PRIOR TO SURGERY, CHANGE BED LINENS AND NO PETS IN THE BED.     INSTRUCTED NO LOTIONS, JEWELRY, PIERCINGS,  NAIL POLISH, OR DEODORANT DAY OF SURGERY    - IF DIABETIC, CHECK BLOOD GLUCOSE IF LESS THAN 70 OR HAVING SYMPTOMS CALL THE PREOP AREA FOR INSTRUCTIONS ON AM OF SURGERY ([phone])    -INSTRUCTED TO TAKE THE FOLLOWING MEDICATIONS THE DAY OF SURGERY WITH SIPS OF WATER: TRAMADOL, GABAPENTIN, CLARITIN, BACLOFEN PROTONIX    - DO NOT BRING ANY MEDICATIONS WITH YOU TO THE HOSPITAL THE DAY OF SURGERY, EXCEPT IF USE INHALERS. BRING INHALERS DAY OF SURGERY      - DO NOT SMOKE OR VAPE 24 HOURS PRIOR TO PROCEDURE PER ANESTHESIA REQUEST     -MAKE SURE YOU HAVE A RIDE HOME OR SOMEONE TO STAY WITH YOU THE DAY OF THE PROCEDURE AFTER YOU GO HOME     - FOLLOW ANY OTHER INSTRUCTIONS GIVEN TO YOU BY YOUR SURGEON'S OFFICE.     DAY OF SURGERY _7/18/25__ _______ AT [facility] ( [address]), YOU CAN  PARK OR SELF PARK. ENTER THE [place] THRU MAIN ENTRANCE, TAKE ELEVATORS TO THE FIRST FLOOR, CHECK IN AT THE DESK FOR REGISTRATION/ SURGERY.   - YOU WILL RECEIVE A PHONE CALL THE DAY PRIOR TO SURGERY BETWEEN 1PM AND 4 PM WITH ARRIVAL TIME, IF YOUR SURGERY IS ON A MONDAY YOU WILL RECEIVE A CALL THE FRIDAY PRIOR TO SURGERY DATE    - BRING CASH OR CREDIT CARD FOR COPAYMENT OF MEDICATIONS AFTER SURGERY IF YOU USE THE HOSPITAL PHARMACY (MEDS TO BED)    - PREADMISSION TESTING NURSE JIMBO Riley [phone]  IF HAVE  ANY QUESTIONS     -PATIENT PROVIDED THE NUMBER FOR PREOP SURGICAL DEPT IF HAD QUESTIONS AFTER HOURS PRIOR TO SURGERY ([phone]).  INFORMED PT IF NO ANSWER, LEAVE A MESSAGE AND SOMEONE WILL RETURN THEIR CALL       PATIENT VERBALIZED UNDERSTANDING       Clear Liquid Diet        Find out when you need to start a clear liquid diet.   Think of “clear liquids” as anything you could read a newspaper through. This includes things like water, broth, sports drinks, or tea WITHOUT any kind of milk or cream.           Once you are told to start a clear liquid diet, only drink these things until 2 hours before arrival to the hospital or when the hospital says to stop. Total volume limitation: 8 oz.       Clear liquids you CAN drink:   Water   Clear broth: beef, chicken, vegetable, or bone broth with nothing in it   Gatorade   Lemonade or Bentley-aid   Soda   Tea, coffee (NO cream or honey)   Jell-O (without fruit)   Popsicles (without fruit or cream)   Italian ices   Juice without pulp: apple, white, grape   You may use salt, pepper, and sugar  NO RED  NO NOODLES    Do NOT drink:   Milk or cream   Soy milk, almond milk, coconut milk, or other non-dairy drinks and   creamers   Milkshakes or smoothies   Tomato juice   Orange juice   Grapefruit juice   Cream soups or any other than broth         Clear Liquid Diet:  Do NOT eat any solid food.  Do NOT eat or suck on mints or candy.  Do NOT chew gum.  Do NOT drink thick liquids like milk or juice with pulp in it.  Do NOT add milk, cream, or anything like soy milk or almond milk to coffee or tea.

## 2025-07-18 ENCOUNTER — HOSPITAL ENCOUNTER (OUTPATIENT)
Facility: HOSPITAL | Age: 75
Discharge: HOME | End: 2025-07-19
Attending: UROLOGY | Admitting: UROLOGY
Payer: MEDICARE

## 2025-07-18 ENCOUNTER — ANESTHESIA (OUTPATIENT)
Dept: PERIOP | Facility: HOSPITAL | Age: 75
End: 2025-07-18
Payer: MEDICARE

## 2025-07-18 ENCOUNTER — APPOINTMENT (OUTPATIENT)
Dept: GENERAL RADIOLOGY | Facility: HOSPITAL | Age: 75
End: 2025-07-18
Payer: MEDICARE

## 2025-07-18 ENCOUNTER — ANESTHESIA EVENT (OUTPATIENT)
Dept: PERIOP | Facility: HOSPITAL | Age: 75
End: 2025-07-18
Payer: MEDICARE

## 2025-07-18 DIAGNOSIS — N43.3 HYDROCELE IN ADULT: ICD-10-CM

## 2025-07-18 DIAGNOSIS — R39.12 BENIGN PROSTATIC HYPERPLASIA WITH WEAK URINARY STREAM: ICD-10-CM

## 2025-07-18 DIAGNOSIS — N40.1 BENIGN PROSTATIC HYPERPLASIA WITH WEAK URINARY STREAM: ICD-10-CM

## 2025-07-18 DIAGNOSIS — N50.812 PAIN IN LEFT TESTICLE: ICD-10-CM

## 2025-07-18 PROBLEM — R09.02 POSTOPERATIVE HYPOXIA: Status: ACTIVE | Noted: 2025-07-18

## 2025-07-18 PROBLEM — Z98.890 POSTOPERATIVE HYPOXIA: Status: ACTIVE | Noted: 2025-07-18

## 2025-07-18 LAB
ANION GAP SERPL CALCULATED.3IONS-SCNC: 8.4 MMOL/L (ref 5–15)
BASOPHILS # BLD AUTO: 0.04 10*3/MM3 (ref 0–0.2)
BASOPHILS NFR BLD AUTO: 0.3 % (ref 0–1.5)
BUN SERPL-MCNC: 33 MG/DL (ref 8–23)
BUN/CREAT SERPL: 30.3 (ref 7–25)
CALCIUM SPEC-SCNC: 9.7 MG/DL (ref 8.6–10.5)
CHLORIDE SERPL-SCNC: 108 MMOL/L (ref 98–107)
CO2 SERPL-SCNC: 24.6 MMOL/L (ref 22–29)
CREAT SERPL-MCNC: 1.09 MG/DL (ref 0.76–1.27)
DEPRECATED RDW RBC AUTO: 42.7 FL (ref 37–54)
EGFRCR SERPLBLD CKD-EPI 2021: 71.7 ML/MIN/1.73
EOSINOPHIL # BLD AUTO: 0.01 10*3/MM3 (ref 0–0.4)
EOSINOPHIL NFR BLD AUTO: 0.1 % (ref 0.3–6.2)
ERYTHROCYTE [DISTWIDTH] IN BLOOD BY AUTOMATED COUNT: 12.9 % (ref 12.3–15.4)
GLUCOSE BLDC GLUCOMTR-MCNC: 245 MG/DL (ref 70–99)
GLUCOSE BLDC GLUCOMTR-MCNC: 260 MG/DL (ref 70–99)
GLUCOSE BLDC GLUCOMTR-MCNC: 338 MG/DL (ref 70–99)
GLUCOSE SERPL-MCNC: 181 MG/DL (ref 65–99)
HCT VFR BLD AUTO: 38.4 % (ref 37.5–51)
HGB BLD-MCNC: 12.7 G/DL (ref 13–17.7)
HOLD SPECIMEN: NORMAL
IMM GRANULOCYTES # BLD AUTO: 0.04 10*3/MM3 (ref 0–0.05)
IMM GRANULOCYTES NFR BLD AUTO: 0.3 % (ref 0–0.5)
LYMPHOCYTES # BLD AUTO: 0.52 10*3/MM3 (ref 0.7–3.1)
LYMPHOCYTES NFR BLD AUTO: 4 % (ref 19.6–45.3)
MCH RBC QN AUTO: 29.8 PG (ref 26.6–33)
MCHC RBC AUTO-ENTMCNC: 33.1 G/DL (ref 31.5–35.7)
MCV RBC AUTO: 90.1 FL (ref 79–97)
MONOCYTES # BLD AUTO: 0.18 10*3/MM3 (ref 0.1–0.9)
MONOCYTES NFR BLD AUTO: 1.4 % (ref 5–12)
NEUTROPHILS NFR BLD AUTO: 12.1 10*3/MM3 (ref 1.7–7)
NEUTROPHILS NFR BLD AUTO: 93.9 % (ref 42.7–76)
NRBC BLD AUTO-RTO: 0 /100 WBC (ref 0–0.2)
NT-PROBNP SERPL-MCNC: <36 PG/ML (ref 0–900)
PLATELET # BLD AUTO: 256 10*3/MM3 (ref 140–450)
PMV BLD AUTO: 9.1 FL (ref 6–12)
POTASSIUM SERPL-SCNC: 4.7 MMOL/L (ref 3.5–5.2)
RBC # BLD AUTO: 4.26 10*6/MM3 (ref 4.14–5.8)
SODIUM SERPL-SCNC: 141 MMOL/L (ref 136–145)
WBC NRBC COR # BLD AUTO: 12.89 10*3/MM3 (ref 3.4–10.8)

## 2025-07-18 PROCEDURE — 63710000001 TEMAZEPAM 15 MG CAPSULE: Performed by: INTERNAL MEDICINE

## 2025-07-18 PROCEDURE — 54860 REMOVAL OF EPIDIDYMIS: CPT | Performed by: UROLOGY

## 2025-07-18 PROCEDURE — A9270 NON-COVERED ITEM OR SERVICE: HCPCS | Performed by: UROLOGY

## 2025-07-18 PROCEDURE — 88302 TISSUE EXAM BY PATHOLOGIST: CPT | Performed by: UROLOGY

## 2025-07-18 PROCEDURE — 25010000002 LIDOCAINE PF 2% 2 % SOLUTION: Performed by: NURSE ANESTHETIST, CERTIFIED REGISTERED

## 2025-07-18 PROCEDURE — 80048 BASIC METABOLIC PNL TOTAL CA: CPT | Performed by: ANESTHESIOLOGY

## 2025-07-18 PROCEDURE — 63710000001 IBUPROFEN 600 MG TABLET: Performed by: UROLOGY

## 2025-07-18 PROCEDURE — 93010 ELECTROCARDIOGRAM REPORT: CPT | Performed by: INTERNAL MEDICINE

## 2025-07-18 PROCEDURE — 25010000002 DEXAMETHASONE PER 1 MG: Performed by: NURSE ANESTHETIST, CERTIFIED REGISTERED

## 2025-07-18 PROCEDURE — 83880 ASSAY OF NATRIURETIC PEPTIDE: CPT | Performed by: INTERNAL MEDICINE

## 2025-07-18 PROCEDURE — 71045 X-RAY EXAM CHEST 1 VIEW: CPT

## 2025-07-18 PROCEDURE — 63710000001 OXYCODONE-ACETAMINOPHEN 10-325 MG TABLET: Performed by: UROLOGY

## 2025-07-18 PROCEDURE — 94640 AIRWAY INHALATION TREATMENT: CPT

## 2025-07-18 PROCEDURE — 63710000001 DOCUSATE SODIUM 100 MG CAPSULE: Performed by: UROLOGY

## 2025-07-18 PROCEDURE — 82948 REAGENT STRIP/BLOOD GLUCOSE: CPT

## 2025-07-18 PROCEDURE — S0260 H&P FOR SURGERY: HCPCS | Performed by: UROLOGY

## 2025-07-18 PROCEDURE — 25010000002 BUPIVACAINE (PF) 0.5 % SOLUTION: Performed by: UROLOGY

## 2025-07-18 PROCEDURE — 94761 N-INVAS EAR/PLS OXIMETRY MLT: CPT

## 2025-07-18 PROCEDURE — A9270 NON-COVERED ITEM OR SERVICE: HCPCS | Performed by: INTERNAL MEDICINE

## 2025-07-18 PROCEDURE — 25010000002 GLYCOPYRROLATE 0.2 MG/ML SOLUTION: Performed by: NURSE ANESTHETIST, CERTIFIED REGISTERED

## 2025-07-18 PROCEDURE — 63710000001 ATORVASTATIN 40 MG TABLET: Performed by: INTERNAL MEDICINE

## 2025-07-18 PROCEDURE — 99223 1ST HOSP IP/OBS HIGH 75: CPT | Performed by: INTERNAL MEDICINE

## 2025-07-18 PROCEDURE — 25010000002 CEFAZOLIN PER 500 MG: Performed by: UROLOGY

## 2025-07-18 PROCEDURE — 25010000002 FENTANYL CITRATE (PF) 50 MCG/ML SOLUTION: Performed by: NURSE ANESTHETIST, CERTIFIED REGISTERED

## 2025-07-18 PROCEDURE — 55040 REMOVAL OF HYDROCELE: CPT | Performed by: UROLOGY

## 2025-07-18 PROCEDURE — 25010000002 HYDROMORPHONE 1 MG/ML SOLUTION: Performed by: NURSE ANESTHETIST, CERTIFIED REGISTERED

## 2025-07-18 PROCEDURE — 85025 COMPLETE CBC W/AUTO DIFF WBC: CPT | Performed by: INTERNAL MEDICINE

## 2025-07-18 PROCEDURE — 25010000002 PROPOFOL 10 MG/ML EMULSION: Performed by: NURSE ANESTHETIST, CERTIFIED REGISTERED

## 2025-07-18 PROCEDURE — 25010000002 FUROSEMIDE PER 20 MG: Performed by: INTERNAL MEDICINE

## 2025-07-18 PROCEDURE — 63710000001 GABAPENTIN 300 MG CAPSULE: Performed by: INTERNAL MEDICINE

## 2025-07-18 PROCEDURE — 25010000002 KETOROLAC TROMETHAMINE PER 15 MG: Performed by: UROLOGY

## 2025-07-18 PROCEDURE — 25810000003 LACTATED RINGERS PER 1000 ML: Performed by: UROLOGY

## 2025-07-18 PROCEDURE — 25010000002 MORPHINE PER 10 MG: Performed by: UROLOGY

## 2025-07-18 PROCEDURE — 63710000001 INSULIN LISPRO (HUMAN) PER 5 UNITS: Performed by: INTERNAL MEDICINE

## 2025-07-18 PROCEDURE — 25810000003 LACTATED RINGERS PER 1000 ML: Performed by: ANESTHESIOLOGY

## 2025-07-18 PROCEDURE — 94799 UNLISTED PULMONARY SVC/PX: CPT

## 2025-07-18 PROCEDURE — 93005 ELECTROCARDIOGRAM TRACING: CPT | Performed by: ANESTHESIOLOGY

## 2025-07-18 RX ORDER — EPHEDRINE SULFATE 50 MG/ML
INJECTION INTRAVENOUS AS NEEDED
Status: DISCONTINUED | OUTPATIENT
Start: 2025-07-18 | End: 2025-07-18 | Stop reason: SURG

## 2025-07-18 RX ORDER — SODIUM CHLORIDE, SODIUM LACTATE, POTASSIUM CHLORIDE, CALCIUM CHLORIDE 600; 310; 30; 20 MG/100ML; MG/100ML; MG/100ML; MG/100ML
9 INJECTION, SOLUTION INTRAVENOUS CONTINUOUS PRN
Status: DISCONTINUED | OUTPATIENT
Start: 2025-07-18 | End: 2025-07-18 | Stop reason: HOSPADM

## 2025-07-18 RX ORDER — ONDANSETRON 2 MG/ML
4 INJECTION INTRAMUSCULAR; INTRAVENOUS ONCE AS NEEDED
Status: DISCONTINUED | OUTPATIENT
Start: 2025-07-18 | End: 2025-07-19 | Stop reason: HOSPADM

## 2025-07-18 RX ORDER — BUPIVACAINE HCL/0.9 % NACL/PF 0.125 %
PLASTIC BAG, INJECTION (ML) EPIDURAL AS NEEDED
Status: DISCONTINUED | OUTPATIENT
Start: 2025-07-18 | End: 2025-07-18 | Stop reason: SURG

## 2025-07-18 RX ORDER — IPRATROPIUM BROMIDE AND ALBUTEROL SULFATE 2.5; .5 MG/3ML; MG/3ML
3 SOLUTION RESPIRATORY (INHALATION) ONCE
Status: COMPLETED | OUTPATIENT
Start: 2025-07-18 | End: 2025-07-18

## 2025-07-18 RX ORDER — ONDANSETRON 4 MG/1
4 TABLET, ORALLY DISINTEGRATING ORAL EVERY 6 HOURS PRN
Status: DISCONTINUED | OUTPATIENT
Start: 2025-07-18 | End: 2025-07-19 | Stop reason: HOSPADM

## 2025-07-18 RX ORDER — OXYCODONE AND ACETAMINOPHEN 5; 325 MG/1; MG/1
1 TABLET ORAL EVERY 4 HOURS PRN
Status: DISCONTINUED | OUTPATIENT
Start: 2025-07-18 | End: 2025-07-19 | Stop reason: HOSPADM

## 2025-07-18 RX ORDER — SODIUM CHLORIDE 0.9 % (FLUSH) 0.9 %
10 SYRINGE (ML) INJECTION EVERY 12 HOURS SCHEDULED
Status: DISCONTINUED | OUTPATIENT
Start: 2025-07-18 | End: 2025-07-19 | Stop reason: HOSPADM

## 2025-07-18 RX ORDER — BUDESONIDE 0.5 MG/2ML
0.5 INHALANT ORAL
Status: DISCONTINUED | OUTPATIENT
Start: 2025-07-18 | End: 2025-07-19 | Stop reason: HOSPADM

## 2025-07-18 RX ORDER — DOCUSATE SODIUM 100 MG/1
100 CAPSULE, LIQUID FILLED ORAL 2 TIMES DAILY
Qty: 30 CAPSULE | Refills: 0 | Status: SHIPPED | OUTPATIENT
Start: 2025-07-18 | End: 2025-07-19

## 2025-07-18 RX ORDER — SODIUM CHLORIDE 9 MG/ML
40 INJECTION, SOLUTION INTRAVENOUS AS NEEDED
Status: DISCONTINUED | OUTPATIENT
Start: 2025-07-18 | End: 2025-07-19 | Stop reason: HOSPADM

## 2025-07-18 RX ORDER — MORPHINE SULFATE 2 MG/ML
2 INJECTION, SOLUTION INTRAMUSCULAR; INTRAVENOUS
Status: DISCONTINUED | OUTPATIENT
Start: 2025-07-18 | End: 2025-07-19 | Stop reason: HOSPADM

## 2025-07-18 RX ORDER — IPRATROPIUM BROMIDE AND ALBUTEROL SULFATE 2.5; .5 MG/3ML; MG/3ML
3 SOLUTION RESPIRATORY (INHALATION)
Status: DISCONTINUED | OUTPATIENT
Start: 2025-07-18 | End: 2025-07-19 | Stop reason: HOSPADM

## 2025-07-18 RX ORDER — DICLOFENAC SODIUM 75 MG/1
75 TABLET, DELAYED RELEASE ORAL 2 TIMES DAILY WITH MEALS
COMMUNITY

## 2025-07-18 RX ORDER — SODIUM CHLORIDE, SODIUM LACTATE, POTASSIUM CHLORIDE, CALCIUM CHLORIDE 600; 310; 30; 20 MG/100ML; MG/100ML; MG/100ML; MG/100ML
50 INJECTION, SOLUTION INTRAVENOUS CONTINUOUS
Status: DISCONTINUED | OUTPATIENT
Start: 2025-07-18 | End: 2025-07-18

## 2025-07-18 RX ORDER — FUROSEMIDE 10 MG/ML
40 INJECTION INTRAMUSCULAR; INTRAVENOUS ONCE
Status: COMPLETED | OUTPATIENT
Start: 2025-07-18 | End: 2025-07-18

## 2025-07-18 RX ORDER — DEXTROSE MONOHYDRATE 25 G/50ML
25 INJECTION, SOLUTION INTRAVENOUS
Status: DISCONTINUED | OUTPATIENT
Start: 2025-07-18 | End: 2025-07-19

## 2025-07-18 RX ORDER — MAGNESIUM HYDROXIDE 1200 MG/15ML
LIQUID ORAL AS NEEDED
Status: DISCONTINUED | OUTPATIENT
Start: 2025-07-18 | End: 2025-07-18 | Stop reason: HOSPADM

## 2025-07-18 RX ORDER — OXYCODONE HYDROCHLORIDE 5 MG/1
5 TABLET ORAL
Status: COMPLETED | OUTPATIENT
Start: 2025-07-18 | End: 2025-07-18

## 2025-07-18 RX ORDER — KETAMINE HYDROCHLORIDE 10 MG/ML
INJECTION, SOLUTION INTRAMUSCULAR; INTRAVENOUS AS NEEDED
Status: DISCONTINUED | OUTPATIENT
Start: 2025-07-18 | End: 2025-07-18 | Stop reason: SURG

## 2025-07-18 RX ORDER — IBUPROFEN 600 MG/1
1 TABLET ORAL
Status: DISCONTINUED | OUTPATIENT
Start: 2025-07-18 | End: 2025-07-19

## 2025-07-18 RX ORDER — DOCUSATE SODIUM 100 MG/1
100 CAPSULE, LIQUID FILLED ORAL 2 TIMES DAILY
Status: DISCONTINUED | OUTPATIENT
Start: 2025-07-18 | End: 2025-07-19 | Stop reason: HOSPADM

## 2025-07-18 RX ORDER — GABAPENTIN 400 MG/1
800 CAPSULE ORAL EVERY 8 HOURS SCHEDULED
Status: DISCONTINUED | OUTPATIENT
Start: 2025-07-18 | End: 2025-07-18 | Stop reason: DRUGHIGH

## 2025-07-18 RX ORDER — ACETAMINOPHEN 325 MG/1
650 TABLET ORAL EVERY 4 HOURS PRN
Status: DISCONTINUED | OUTPATIENT
Start: 2025-07-18 | End: 2025-07-19 | Stop reason: HOSPADM

## 2025-07-18 RX ORDER — GLYCOPYRROLATE 0.2 MG/ML
INJECTION INTRAMUSCULAR; INTRAVENOUS AS NEEDED
Status: DISCONTINUED | OUTPATIENT
Start: 2025-07-18 | End: 2025-07-18 | Stop reason: SURG

## 2025-07-18 RX ORDER — ACETAMINOPHEN 650 MG/1
650 SUPPOSITORY RECTAL EVERY 4 HOURS PRN
Status: DISCONTINUED | OUTPATIENT
Start: 2025-07-18 | End: 2025-07-19 | Stop reason: HOSPADM

## 2025-07-18 RX ORDER — OXYCODONE AND ACETAMINOPHEN 10; 325 MG/1; MG/1
1 TABLET ORAL EVERY 4 HOURS PRN
Status: DISCONTINUED | OUTPATIENT
Start: 2025-07-18 | End: 2025-07-19 | Stop reason: HOSPADM

## 2025-07-18 RX ORDER — PROMETHAZINE HYDROCHLORIDE 25 MG/1
25 SUPPOSITORY RECTAL ONCE AS NEEDED
Status: DISCONTINUED | OUTPATIENT
Start: 2025-07-18 | End: 2025-07-18 | Stop reason: HOSPADM

## 2025-07-18 RX ORDER — ONDANSETRON 2 MG/ML
4 INJECTION INTRAMUSCULAR; INTRAVENOUS EVERY 6 HOURS PRN
Status: DISCONTINUED | OUTPATIENT
Start: 2025-07-18 | End: 2025-07-19 | Stop reason: HOSPADM

## 2025-07-18 RX ORDER — EPHEDRINE SULFATE 50 MG/ML
INJECTION, SOLUTION INTRAVENOUS AS NEEDED
Status: DISCONTINUED | OUTPATIENT
Start: 2025-07-18 | End: 2025-07-18

## 2025-07-18 RX ORDER — BACLOFEN 10 MG/1
10 TABLET ORAL 3 TIMES DAILY PRN
Status: DISCONTINUED | OUTPATIENT
Start: 2025-07-18 | End: 2025-07-19 | Stop reason: HOSPADM

## 2025-07-18 RX ORDER — PROMETHAZINE HYDROCHLORIDE 25 MG/1
25 TABLET ORAL ONCE AS NEEDED
Status: DISCONTINUED | OUTPATIENT
Start: 2025-07-18 | End: 2025-07-18 | Stop reason: HOSPADM

## 2025-07-18 RX ORDER — PANTOPRAZOLE SODIUM 40 MG/1
40 TABLET, DELAYED RELEASE ORAL DAILY
Status: DISCONTINUED | OUTPATIENT
Start: 2025-07-19 | End: 2025-07-19 | Stop reason: HOSPADM

## 2025-07-18 RX ORDER — DEXAMETHASONE SODIUM PHOSPHATE 4 MG/ML
INJECTION, SOLUTION INTRA-ARTICULAR; INTRALESIONAL; INTRAMUSCULAR; INTRAVENOUS; SOFT TISSUE AS NEEDED
Status: DISCONTINUED | OUTPATIENT
Start: 2025-07-18 | End: 2025-07-18 | Stop reason: SURG

## 2025-07-18 RX ORDER — IBUPROFEN 600 MG/1
600 TABLET, FILM COATED ORAL EVERY 6 HOURS PRN
Status: DISCONTINUED | OUTPATIENT
Start: 2025-07-18 | End: 2025-07-18 | Stop reason: HOSPADM

## 2025-07-18 RX ORDER — ONDANSETRON 4 MG/1
4 TABLET, ORALLY DISINTEGRATING ORAL ONCE AS NEEDED
Status: DISCONTINUED | OUTPATIENT
Start: 2025-07-18 | End: 2025-07-19 | Stop reason: HOSPADM

## 2025-07-18 RX ORDER — ATORVASTATIN CALCIUM 40 MG/1
80 TABLET, FILM COATED ORAL NIGHTLY
Status: DISCONTINUED | OUTPATIENT
Start: 2025-07-18 | End: 2025-07-19 | Stop reason: HOSPADM

## 2025-07-18 RX ORDER — OXYCODONE AND ACETAMINOPHEN 5; 325 MG/1; MG/1
.5-2 TABLET ORAL EVERY 6 HOURS PRN
Qty: 16 TABLET | Refills: 0 | Status: SHIPPED | OUTPATIENT
Start: 2025-07-18 | End: 2025-07-19

## 2025-07-18 RX ORDER — KETOROLAC TROMETHAMINE 30 MG/ML
15 INJECTION, SOLUTION INTRAMUSCULAR; INTRAVENOUS EVERY 6 HOURS PRN
Status: DISCONTINUED | OUTPATIENT
Start: 2025-07-18 | End: 2025-07-19 | Stop reason: HOSPADM

## 2025-07-18 RX ORDER — FENTANYL CITRATE 50 UG/ML
INJECTION, SOLUTION INTRAMUSCULAR; INTRAVENOUS AS NEEDED
Status: DISCONTINUED | OUTPATIENT
Start: 2025-07-18 | End: 2025-07-18 | Stop reason: SURG

## 2025-07-18 RX ORDER — ASPIRIN 81 MG/1
81 TABLET ORAL DAILY
Start: 2025-07-20

## 2025-07-18 RX ORDER — ALBUTEROL SULFATE 0.83 MG/ML
2.5 SOLUTION RESPIRATORY (INHALATION) EVERY 4 HOURS PRN
Status: DISCONTINUED | OUTPATIENT
Start: 2025-07-18 | End: 2025-07-19 | Stop reason: HOSPADM

## 2025-07-18 RX ORDER — ARFORMOTEROL TARTRATE 15 UG/2ML
15 SOLUTION RESPIRATORY (INHALATION)
Status: DISCONTINUED | OUTPATIENT
Start: 2025-07-18 | End: 2025-07-19 | Stop reason: HOSPADM

## 2025-07-18 RX ORDER — PROPOFOL 10 MG/ML
VIAL (ML) INTRAVENOUS AS NEEDED
Status: DISCONTINUED | OUTPATIENT
Start: 2025-07-18 | End: 2025-07-18 | Stop reason: SURG

## 2025-07-18 RX ORDER — SODIUM CHLORIDE 0.9 % (FLUSH) 0.9 %
10 SYRINGE (ML) INJECTION AS NEEDED
Status: DISCONTINUED | OUTPATIENT
Start: 2025-07-18 | End: 2025-07-19 | Stop reason: HOSPADM

## 2025-07-18 RX ORDER — ACETAMINOPHEN 500 MG
1000 TABLET ORAL ONCE
Status: COMPLETED | OUTPATIENT
Start: 2025-07-18 | End: 2025-07-18

## 2025-07-18 RX ORDER — NALOXONE HCL 0.4 MG/ML
0.4 VIAL (ML) INJECTION
Status: DISCONTINUED | OUTPATIENT
Start: 2025-07-18 | End: 2025-07-19 | Stop reason: HOSPADM

## 2025-07-18 RX ORDER — ONDANSETRON 2 MG/ML
4 INJECTION INTRAMUSCULAR; INTRAVENOUS ONCE AS NEEDED
Status: DISCONTINUED | OUTPATIENT
Start: 2025-07-18 | End: 2025-07-18 | Stop reason: HOSPADM

## 2025-07-18 RX ORDER — ACETAMINOPHEN 325 MG/1
650 TABLET ORAL ONCE
Status: DISCONTINUED | OUTPATIENT
Start: 2025-07-18 | End: 2025-07-18

## 2025-07-18 RX ORDER — CLOPIDOGREL BISULFATE 75 MG/1
75 TABLET ORAL DAILY
Start: 2025-07-21

## 2025-07-18 RX ORDER — NICOTINE POLACRILEX 4 MG
15 LOZENGE BUCCAL
Status: DISCONTINUED | OUTPATIENT
Start: 2025-07-18 | End: 2025-07-19

## 2025-07-18 RX ORDER — TEMAZEPAM 15 MG/1
15 CAPSULE ORAL NIGHTLY PRN
Status: DISCONTINUED | OUTPATIENT
Start: 2025-07-18 | End: 2025-07-19 | Stop reason: HOSPADM

## 2025-07-18 RX ORDER — GABAPENTIN 300 MG/1
600 CAPSULE ORAL EVERY 8 HOURS SCHEDULED
Status: DISCONTINUED | OUTPATIENT
Start: 2025-07-18 | End: 2025-07-19 | Stop reason: HOSPADM

## 2025-07-18 RX ORDER — INSULIN LISPRO 100 [IU]/ML
2-9 INJECTION, SOLUTION INTRAVENOUS; SUBCUTANEOUS
Status: DISCONTINUED | OUTPATIENT
Start: 2025-07-18 | End: 2025-07-19

## 2025-07-18 RX ORDER — LIDOCAINE HYDROCHLORIDE 20 MG/ML
INJECTION, SOLUTION EPIDURAL; INFILTRATION; INTRACAUDAL; PERINEURAL AS NEEDED
Status: DISCONTINUED | OUTPATIENT
Start: 2025-07-18 | End: 2025-07-18 | Stop reason: SURG

## 2025-07-18 RX ORDER — PROMETHAZINE HYDROCHLORIDE 25 MG/1
12.5 TABLET ORAL ONCE AS NEEDED
Status: DISCONTINUED | OUTPATIENT
Start: 2025-07-18 | End: 2025-07-19 | Stop reason: HOSPADM

## 2025-07-18 RX ORDER — BUPIVACAINE HYDROCHLORIDE 5 MG/ML
INJECTION, SOLUTION EPIDURAL; INTRACAUDAL; PERINEURAL AS NEEDED
Status: DISCONTINUED | OUTPATIENT
Start: 2025-07-18 | End: 2025-07-18 | Stop reason: HOSPADM

## 2025-07-18 RX ADMIN — Medication 100 MCG: at 09:14

## 2025-07-18 RX ADMIN — Medication 100 MCG: at 08:16

## 2025-07-18 RX ADMIN — DEXAMETHASONE SODIUM PHOSPHATE 6 MG: 4 INJECTION, SOLUTION INTRAMUSCULAR; INTRAVENOUS at 08:10

## 2025-07-18 RX ADMIN — OXYCODONE HYDROCHLORIDE AND ACETAMINOPHEN 1 TABLET: 10; 325 TABLET ORAL at 19:24

## 2025-07-18 RX ADMIN — GABAPENTIN 600 MG: 300 CAPSULE ORAL at 21:42

## 2025-07-18 RX ADMIN — HYDROMORPHONE HYDROCHLORIDE 0.5 MG: 1 INJECTION, SOLUTION INTRAMUSCULAR; INTRAVENOUS; SUBCUTANEOUS at 11:42

## 2025-07-18 RX ADMIN — SODIUM CHLORIDE, POTASSIUM CHLORIDE, SODIUM LACTATE AND CALCIUM CHLORIDE 50 ML/HR: 600; 310; 30; 20 INJECTION, SOLUTION INTRAVENOUS at 13:10

## 2025-07-18 RX ADMIN — GLYCOPYRROLATE 0.2 MG: 0.2 INJECTION INTRAMUSCULAR; INTRAVENOUS at 08:36

## 2025-07-18 RX ADMIN — LIDOCAINE HYDROCHLORIDE 60 MG: 20 INJECTION, SOLUTION EPIDURAL; INFILTRATION; INTRACAUDAL; PERINEURAL at 08:04

## 2025-07-18 RX ADMIN — INSULIN LISPRO 4 UNITS: 100 INJECTION, SOLUTION INTRAVENOUS; SUBCUTANEOUS at 13:10

## 2025-07-18 RX ADMIN — TEMAZEPAM 15 MG: 15 CAPSULE ORAL at 21:43

## 2025-07-18 RX ADMIN — SODIUM CHLORIDE 2000 MG: 9 INJECTION, SOLUTION INTRAVENOUS at 08:03

## 2025-07-18 RX ADMIN — IPRATROPIUM BROMIDE AND ALBUTEROL SULFATE 3 ML: .5; 3 SOLUTION RESPIRATORY (INHALATION) at 10:29

## 2025-07-18 RX ADMIN — Medication 10 ML: at 13:12

## 2025-07-18 RX ADMIN — FENTANYL CITRATE 50 MCG: 50 INJECTION, SOLUTION INTRAMUSCULAR; INTRAVENOUS at 08:28

## 2025-07-18 RX ADMIN — KETAMINE HYDROCHLORIDE 20 MG: 10 INJECTION INTRAMUSCULAR; INTRAVENOUS at 08:39

## 2025-07-18 RX ADMIN — EPHEDRINE SULFATE 10 MG: 50 INJECTION INTRAVENOUS at 08:23

## 2025-07-18 RX ADMIN — KETOROLAC TROMETHAMINE 15 MG: 30 INJECTION, SOLUTION INTRAMUSCULAR; INTRAVENOUS at 21:43

## 2025-07-18 RX ADMIN — FENTANYL CITRATE 25 MCG: 50 INJECTION, SOLUTION INTRAMUSCULAR; INTRAVENOUS at 08:16

## 2025-07-18 RX ADMIN — DOCUSATE SODIUM 100 MG: 100 CAPSULE, LIQUID FILLED ORAL at 13:10

## 2025-07-18 RX ADMIN — FENTANYL CITRATE 25 MCG: 50 INJECTION, SOLUTION INTRAMUSCULAR; INTRAVENOUS at 08:25

## 2025-07-18 RX ADMIN — FUROSEMIDE 40 MG: 10 INJECTION, SOLUTION INTRAMUSCULAR; INTRAVENOUS at 14:57

## 2025-07-18 RX ADMIN — Medication 100 MCG: at 08:59

## 2025-07-18 RX ADMIN — SODIUM CHLORIDE, POTASSIUM CHLORIDE, SODIUM LACTATE AND CALCIUM CHLORIDE 9 ML/HR: 600; 310; 30; 20 INJECTION, SOLUTION INTRAVENOUS at 07:05

## 2025-07-18 RX ADMIN — ACETAMINOPHEN 1000 MG: 500 TABLET ORAL at 07:09

## 2025-07-18 RX ADMIN — Medication 10 ML: at 21:44

## 2025-07-18 RX ADMIN — INSULIN LISPRO 6 UNITS: 100 INJECTION, SOLUTION INTRAVENOUS; SUBCUTANEOUS at 23:34

## 2025-07-18 RX ADMIN — MORPHINE SULFATE 2 MG: 2 INJECTION, SOLUTION INTRAMUSCULAR; INTRAVENOUS at 21:42

## 2025-07-18 RX ADMIN — OXYCODONE 5 MG: 5 TABLET ORAL at 09:54

## 2025-07-18 RX ADMIN — OXYCODONE HYDROCHLORIDE AND ACETAMINOPHEN 1 TABLET: 10; 325 TABLET ORAL at 15:03

## 2025-07-18 RX ADMIN — INSULIN LISPRO 7 UNITS: 100 INJECTION, SOLUTION INTRAVENOUS; SUBCUTANEOUS at 16:55

## 2025-07-18 RX ADMIN — IBUPROFEN 600 MG: 600 TABLET, FILM COATED ORAL at 11:28

## 2025-07-18 RX ADMIN — PROPOFOL 150 MG: 10 INJECTION, EMULSION INTRAVENOUS at 08:04

## 2025-07-18 RX ADMIN — ATORVASTATIN CALCIUM 80 MG: 40 TABLET, FILM COATED ORAL at 21:42

## 2025-07-18 RX ADMIN — IPRATROPIUM BROMIDE AND ALBUTEROL SULFATE 3 ML: .5; 3 SOLUTION RESPIRATORY (INHALATION) at 18:31

## 2025-07-18 RX ADMIN — MORPHINE SULFATE 2 MG: 2 INJECTION, SOLUTION INTRAMUSCULAR; INTRAVENOUS at 16:55

## 2025-07-18 RX ADMIN — KETAMINE HYDROCHLORIDE 20 MG: 10 INJECTION INTRAMUSCULAR; INTRAVENOUS at 08:27

## 2025-07-18 RX ADMIN — GABAPENTIN 600 MG: 300 CAPSULE ORAL at 14:58

## 2025-07-18 RX ADMIN — OXYCODONE HYDROCHLORIDE AND ACETAMINOPHEN 1 TABLET: 10; 325 TABLET ORAL at 23:35

## 2025-07-18 RX ADMIN — EPHEDRINE SULFATE 10 MG: 50 INJECTION INTRAVENOUS at 08:16

## 2025-07-18 RX ADMIN — EPHEDRINE SULFATE 10 MG: 50 INJECTION INTRAVENOUS at 08:58

## 2025-07-18 RX ADMIN — OXYCODONE 5 MG: 5 TABLET ORAL at 10:17

## 2025-07-18 NOTE — H&P
[facility]   Urology Preop H&P Note    Patient Name: Atilio Olson  : 1950  MRN: [id number]  Primary Care Physician:  Shay Vora MD  Referring Physician: Rupinder Hankins MD  Date of admission: 2025    Subjective   Subjective     Reason for Consult/ Chief Complaint: Hydrocele in adult [N43.3]  Pain in left testicle [N50.812]    HPI:  Atilio Olson is a 74 y.o. male history ofHydrocele in adult [N43.3]  Pain in left testicle [N50.812] who presents for further management OR.  Presents for planned Procedure(s):  EPIDIDYMECTOMY and hydrocelectomy, left  HYDROCELECTOMY;  .  Procedure to be preformed on the left.  Risk, benefits, and alternatives discussed with patient prior to today.seen by [facility] who recommends left epididymectomy, hydrocelectomy for his chronic left orchialgia.  Risks the procedure were discussed with patient including bleeding, infection, damage to surrounding structures, pain, need for further procedures or management, change in quality or persistence of symptoms including worsening symptoms, risk of anesthesia.  All questions were addressed after providing time for discussion.  Patient denies significant changes since last visit.  No new complaints today.    Review of Systems   All systems were reviewed and negative except for the above  Personal History     Past Medical History:   Diagnosis Date    Abnormal ECG     R BBB, follows with PCP    Acid reflux     Arthritis     Arthritis of back     Arthritis of neck     Bladder disorder     BPH    BPH with urinary obstruction 2024    Cervical disc disorder     Claustrophobia     Colon polyp     Congestive heart failure (CHF)     UNSURE OF LAST EPISODE, no current issues    Deep vein thrombosis     right arm, ON THINNER, BLOOD CLOTTING DISORDER, FOLIC ACID DEF.    Diabetes     DOES NOT CHECK BS    Elevated serum creatinine     1.47    Fracture, foot     High cholesterol     Hip arthrosis      Hypertension     CONTROLLED W/ MED    Knee swelling     Limb swelling     Low back pain     Lumbosacral disc disease     Neurogenic bladder 01/18/2017    NSTEMI (non-ST elevated myocardial infarction) 02/02/2024    WAS RULED OUT FELT GASTRO IN NATURE    Periarthritis of shoulder     Peripheral neuropathy     Spinal stenosis     Stroke     x2, last episode 2006, left side slight weakness    Tear of meniscus of knee     Tendonitis of shoulder 02/19/2018    ROTATOR CUFF RIGHT        Past Surgical History:   Procedure Laterality Date    CARDIAC CATHETERIZATION N/A 02/02/2024    Procedure: Left Heart Cath;  Surgeon: Ty Rizvi MD;  Location: [facility] CATH INVASIVE LOCATION;  Service: Cardiovascular;  Laterality: N/A;    COLONOSCOPY      CYSTOSCOPY      CYSTOSCOPY N/A 10/18/2024    Procedure: CYSTOSCOPY WITH URETHRAL DILATATION;  Surgeon: Rupinder Hankins MD;  Location: [facility] MAIN OR;  Service: Urology;  Laterality: N/A;    EPIDIDYMECTOMY Right 10/18/2024    Procedure: RIGHT EPIDIDYMECTOMY;  Surgeon: Rupinder Hankins MD;  Location: [facility] MAIN OR;  Service: Urology;  Laterality: Right;    EXCISION      CYST EXCISION    FOOT SURGERY Left     R/T ARTHRITIS, MULTIPLE SCREWS    GALLBLADDER SURGERY      HAND SURGERY Bilateral     wrist    HERNIA REPAIR      INGUINAL HERNIA REPAIR Left 4/2/2025    Procedure: Robotic left inguinal hernia repair-repair left groin hernia with small incisions, camera and robotic instruments;  Surgeon: Wilder Wiggins MD;  Location: [facility] OR;  Service: Robotics - DaVinci;  Laterality: Left;    LUMBAR DISCECTOMY Left 03/22/2023    Procedure: MINIMALLY INVASIVE LUMBAR LAMINECTOMY, left approach, lumbar 4-lumbar 5;  Surgeon: Ajit Staley MD;  Location: [facility] MAIN OR;  Service: Neurosurgery;  Laterality: Left;    PROSTATE AQUABLATION N/A 05/06/2024    Procedure: TRANSURETHRAL PROSTATE AQUABLATION;  Surgeon: Rupinder Hankins MD;  Location: [facility] MAIN OR;  Service: Robotics -  Urology;  Laterality: N/A;    REPLACEMENT TOTAL KNEE Left     REPLACEMENT TOTAL KNEE Right     X2    SHOULDER SURGERY Right     ARTHROSCOPY    TONSILLECTOMY      WRIST SURGERY         Family History: family history includes Arthritis in his father and mother; Diabetes in his father, mother, sister, and son; Hearing loss in his father; Heart disease in his father and mother; Stroke in his father and sister. Otherwise pertinent FHx was reviewed and not pertinent to current issue.    Social History:  reports that he has quit smoking. His smoking use included cigarettes. He has a 15 pack-year smoking history. He has never used smokeless tobacco. He reports that he does not currently use alcohol. He reports that he does not use drugs.    Home Medications:  B Complex-C-Folic Acid, aspirin, atorvastatin, baclofen, clopidogrel, gabapentin, loratadine, losartan-HCTZ (HYZAAR) 100-12.5 combo dose, metFORMIN, multivitamin with minerals, pantoprazole, temazepam, and traMADol    Allergies:  No Known Allergies    Objective    Objective     Vitals:   Temp:  [97.1 °F (36.2 °C)] 97.1 °F (36.2 °C)  Heart Rate:  [68] 68  Resp:  [16] 16  BP: (125)/(63) 125/63    Physical Exam:   Constitutional: Awake, alert   Respiratory: Clear, nonlabored respirations    Cardiovascular: Regular rate, no chest retractions   gastrointestinal: Appears soft, nontender     Results:    Assessment & Plan   Assessment / Plan     Brief Patient Summary:  Atilio Olson is a 74 y.o. male who     Active Hospital Problems:  Active Hospital Problems    Diagnosis     Hydrocele in adult     Pain in left testicle        Plan:   Proceed to the OR for planned procedure, Procedure(s):  EPIDIDYMECTOMY and hydrocelectomy, left  HYDROCELECTOMY,  , left  Risk, benefits, and alternatives discussed with patient at length he is agreeable to proceed  Laterality identified, left  All questions addressed      Electronically signed by Rupinder Hankins MD, 07/18/25, 7:09 AM EDT.

## 2025-07-18 NOTE — OP NOTE
Preoperative diagnosis  Left orchialgia and hydrocele    Postoperative diagnosis  Left orchialgia and hydrocele    Procedure performed  Left epididymectomy and hydrocelectomy    Attending surgeon  Rupinder Hankins MD     Assistant   Evens Tobin RN    Anesthesia  General    EBL  5 mL    Complications  None    Specimen  Left epididymis    Findings  Normal-appearing left testis; cord lipoma noted, excised; normal-appearing left epididymis; 30 cc hydrocele    Indications  74 y.o. male agreed to undergo the above named procedure after discussion of the alternatives, risks and benefits.   Informed consent was obtained.      Procedure  After informed consent was obtained, anesthesia was administered.  Once patient was adequately anesthetized, he remained supine.  The genitals were prepped and draped in sterile manner.  Timeout was performed.  A midline scrotal incision was made through the skin, subcutaneous tissue, dartos layer, and the tunica vaginalis. The left testicle was then delivered through the scrotal incision.  Left cord lipoma was noted it was ligated.  Hydrocele was appreciated.  The tunica was opened and approximately 30 mL of fluid drained.  The hydrocele sac was then opened exposing the testicle which was normal.  The hydrocele sac was then partially excised.  The epididymis appeared normal.  No palpable abnormality.  Epididymal dissection was completed carefully avoiding the spermatic cord structures and the testicle.  The testis and cord structures were palpably normal.  Epididymis was dissected off of the testicle.  Ultrasound was utilized intermittently to ensure blood supply to the left testis.  The specimen was sent to pathology intact for review. The testicular appendix and epididymal appendix were both coagulated incidentally to avoid future torsion.   The scrotal contents were inspected and noted to be normal. All bleeders were coagulated.  The testis was placed back into the hemiscrotum and  appropriate orientation and a two-layer closure was performed with 3-0 chromic sutures. The skin was closed with Monocryl.  A cord block and incisional block with 2% Marcaine was performed.  The incision was dressed with bacitracin, Telfa, fluffs, and jockstrap.  Patient was then awakened from anesthesia without complications and transferred to the Recovery Room (RR). The patient arrived to the RR in stable condition and without complications.       The first assist, Evens Tobin RN, was present and actively participated throughout the case.           Signed:  Rupinder Hankins MD  07/18/25  09:27 EDT

## 2025-07-18 NOTE — PROGRESS NOTES
Notified by PACU RN, patient requiring 6 L nasal cannula; anesthesia recommends further postoperative monitoring, admission.    Hospitalist service consulted for medical management.  Outpatient a bed order placed.  Wife notified via phone.    Questions addressed    Electronically signed by Rupinder Hankins MD, 07/18/25, 10:54 AM EDT.

## 2025-07-18 NOTE — NURSING NOTE
I, Gretta Tobin RN, completed a skin assessment upon admission of Mr. Olson, with Kathy Elliott RN. His skin is WNL.

## 2025-07-18 NOTE — PLAN OF CARE
Goal Outcome Evaluation:  Plan of Care Reviewed With: patient        Progress: improving  Outcome Evaluation: AOx4, VSS, up adlib. Pain treated per MAR. Titrated patient to 3L NC to maintain sats >90%. Dressing clean dry intact. Patient hopeful to d/c tomorrow.

## 2025-07-18 NOTE — ANESTHESIA PREPROCEDURE EVALUATION
Anesthesia Evaluation     Patient summary reviewed and Nursing notes reviewed   no history of anesthetic complications:   NPO Solid Status: > 8 hours  NPO Liquid Status: > 2 hours           Airway   Mallampati: III  TM distance: >3 FB  Neck ROM: full  No difficulty expected  Dental - normal exam     Pulmonary - negative pulmonary ROS and normal exam    breath sounds clear to auscultation  Cardiovascular - normal exam  Exercise tolerance: poor (<4 METS)    ECG reviewed  PT is on anticoagulation therapy  Rhythm: regular  Rate: normal    (+) hypertension (losartan/hctz), past MI  >12 months, CHF Diastolic >=55%, DVT, hyperlipidemia    ROS comment: Plavix (LD: 7/13/25)    Echo (2/2/24; chest pain):  ·  Left ventricular systolic function is normal. Left ventricular ejection fraction appears to be 56 - 60%.  ·  Left ventricular diastolic function is consistent with (grade I) impaired relaxation.  ·  The right ventricular cavity is mild to moderately dilated, with a normal systolic function.  ·  There are no significant valvular abnormalities.  ·  Unable to calculate pulmonary artery systolic pressure due to incomplete TR Doppler envelope.     EKG: SR 73 bpm  Incomplete RBBB    Neuro/Psych  (+) CVA (Plavix (LD: 7/13/25); left sided weakness), numbness, psychiatric history (Claustrophobia)  GI/Hepatic/Renal/Endo    (+) GERD, diabetes mellitus    Musculoskeletal     Abdominal  - normal exam   Substance History - negative use     OB/GYN negative ob/gyn ROS         Other   arthritis,     ROS/Med Hx Other: <4METS,DEC. MOBILITY/FOOT INJURY.HX HTN,CVA X2. ECHO 2/2/24 EF 56-60%,NO VALVE STENOSIS,CATH 2/2/24 NO SIGN CAD, CONT MED MGMT. PC MED DIRECTIVE 7/1/25. KT                     Anesthesia Plan    ASA 3     general and MAC     (Patient understands anesthesia not responsible for dental damage.)  intravenous induction     Anesthetic plan, risks, benefits, and alternatives have been provided, discussed and informed consent has  been obtained with: patient.    Use of blood products discussed with patient .    Plan discussed with CRNA.        CODE STATUS:

## 2025-07-18 NOTE — DISCHARGE INSTRUCTIONS
DISCHARGE INSTRUCTIONS  SURGICAL / AMBULATORY  PROCEDURES      For your surgery you had:  General anesthesia (you may have a sore throat for the first 24 hours)  IV sedation.  Local anesthesia  Monitored anesthesia Care  You received a medicated patch for nausea prevention today (behind your ear). It is recommended that you remove it 24-48 hours post-operatively. It must be removed within 72 hours.   You have received an anesthesia medication today that can cause hormonal forms of birth control to be ineffective. You should use a different form of birth control (to prevent pregnancy) for 7 days.   You may experience dizziness, drowsiness, or light-headedness for several hours following surgery/procedure.  Do not stay alone today or tonight.  Limit your activity for 24 hours.  Resume your diet slowly.  Follow whatever special dietary instructions you may have been given by your doctor.  You should not drive or operate machinery, drink alcohol, or sign legally binding documents for 24 hours or while you are taking pain medication.  Last dose of pain medication was given at:   .  NOTIFY YOUR DOCTOR IF YOU EXPERIENCE ANY OF THE FOLLOWING:  Temperature greater than 101 degrees Fahrenheit  Shaking Chills  Redness or excessive drainage from incision  Nausea, vomiting and/or pain that is not controlled by prescribed medications  Increase in bleeding or bleeding that is excessive  Unable to urinate in 6 hours after surgery  If unable to reach your doctor, please go to the closest Emergency Room  You may begin dressing changes:     [] in 24 hours   [] in 48 hours   [] Other:    You may remove Band-Aid/dressing tomorrow.  You may shower or bathe   .  Apply an ice pack 24-48 hours.  Medications per physician instructions as indicated on Discharge Medication Information Sheet.    SPECIAL INSTRUCTIONS:

## 2025-07-18 NOTE — H&P
[facility]  Date: 2025   Patient Name: Atilio Olson  : 1950  MRN: [id number]  Primary Care Physician:  Shay Vora MD  Date of admission: 2025    Subjective   Subjective     Chief Complaint: Shortness of breath    HPI:    Atilio Olson is a 74 y.o. male PMH hypertension, diabetes, hyperlipidemia, prior CVA, CHF, BPH, arthritis, and GERD presented for scheduled left epididymectomy and hydrocelectomy but became hypoxic post procedure.  Patient tolerated the procedure well but while in the PACU ultimately required 6 L nasal cannula for adequate saturations.  Patient states he had no shortness of breath or swelling prior to surgery.  Denies any fevers or chills.  When seen, he is on 4 L of cannula and denies shortness of breath.  He does have a history of postoperative hypoxia and states typically by the morning he is feeling better.    Personal History     Past Medical History:  Past Medical History:   Diagnosis Date    Abnormal ECG [id number]    R BBB, follows with PCP    Acid reflux     Arthritis     Arthritis of back     Arthritis of neck     Bladder disorder     BPH    BPH with urinary obstruction 2024    Cervical disc disorder     Claustrophobia     Colon polyp     Congestive heart failure (CHF)     UNSURE OF LAST EPISODE, no current issues    Deep vein thrombosis     right arm, ON THINNER, BLOOD CLOTTING DISORDER, FOLIC ACID DEF.    Diabetes     DOES NOT CHECK BS    Elevated serum creatinine     1.47    Fracture, foot     High cholesterol     Hip arthrosis     Hypertension     CONTROLLED W/ MED    Knee swelling     Limb swelling     Low back pain     Lumbosacral disc disease     Neurogenic bladder 2017    NSTEMI (non-ST elevated myocardial infarction) 2024    WAS RULED OUT FELT GASTRO IN NATURE    Periarthritis of shoulder     Peripheral neuropathy     Spinal stenosis     Stroke     x2, last episode , left side slight  weakness    Tear of meniscus of knee     Tendonitis of shoulder 02/19/2018    ROTATOR CUFF RIGHT        Past Surgical History:  Past Surgical History:   Procedure Laterality Date    CARDIAC CATHETERIZATION N/A 02/02/2024    Procedure: Left Heart Cath;  Surgeon: Ty Rizvi MD;  Location: [facility] CATH INVASIVE LOCATION;  Service: Cardiovascular;  Laterality: N/A;    COLONOSCOPY      CYSTOSCOPY      CYSTOSCOPY N/A 10/18/2024    Procedure: CYSTOSCOPY WITH URETHRAL DILATATION;  Surgeon: Rupinder Hankins MD;  Location: [facility] MAIN OR;  Service: Urology;  Laterality: N/A;    EPIDIDYMECTOMY Right 10/18/2024    Procedure: RIGHT EPIDIDYMECTOMY;  Surgeon: Rupinder Hankins MD;  Location: [facility] MAIN OR;  Service: Urology;  Laterality: Right;    EXCISION      CYST EXCISION    FOOT SURGERY Left     R/T ARTHRITIS, MULTIPLE SCREWS    GALLBLADDER SURGERY      HAND SURGERY Bilateral     wrist    HERNIA REPAIR      INGUINAL HERNIA REPAIR Left 4/2/2025    Procedure: Robotic left inguinal hernia repair-repair left groin hernia with small incisions, camera and robotic instruments;  Surgeon: Wilder Wiggins MD;  Location: [facility] OR;  Service: Robotics - DaVinci;  Laterality: Left;    LUMBAR DISCECTOMY Left 03/22/2023    Procedure: MINIMALLY INVASIVE LUMBAR LAMINECTOMY, left approach, lumbar 4-lumbar 5;  Surgeon: Ajit Staley MD;  Location: [facility] MAIN OR;  Service: Neurosurgery;  Laterality: Left;    PROSTATE AQUABLATION N/A 05/06/2024    Procedure: TRANSURETHRAL PROSTATE AQUABLATION;  Surgeon: Rupinder Hankins MD;  Location: [facility] OR;  Service: Robotics - Urology;  Laterality: N/A;    REPLACEMENT TOTAL KNEE Left     REPLACEMENT TOTAL KNEE Right     X2    SHOULDER SURGERY Right     ARTHROSCOPY    TONSILLECTOMY      WRIST SURGERY         Family History:   Family History   Problem Relation Age of Onset    Arthritis Mother     Heart disease Mother     Diabetes Mother     Stroke Father     Heart disease Father      Diabetes Father     Arthritis Father     Hearing loss Father     Stroke Sister     Diabetes Sister     Diabetes Son     Malig Hyperthermia Neg Hx         Social History:   Social History     Socioeconomic History    Marital status:    Tobacco Use    Smoking status: Former     Current packs/day: 0.50     Average packs/day: 0.5 packs/day for 30.0 years (15.0 ttl pk-yrs)     Types: Cigarettes    Smokeless tobacco: Never    Tobacco comments:     Smoked last yesterday   Vaping Use    Vaping status: Never Used   Substance and Sexual Activity    Alcohol use: Not Currently    Drug use: Never    Sexual activity: Defer        Home Medications:  B Complex-C-Folic Acid, aspirin, atorvastatin, baclofen, clopidogrel, docusate sodium, gabapentin, loratadine, losartan-HCTZ (HYZAAR) 100-12.5 combo dose, metFORMIN, multivitamin with minerals, naloxone, oxyCODONE-acetaminophen, pantoprazole, temazepam, and traMADol    Allergies:  No Known Allergies    Review of Systems   A 14 point review of systems was obtained and otherwise negative unless stated in the HPI    Objective   Objective     Vitals:   Temp:  [96.9 °F (36.1 °C)-97.5 °F (36.4 °C)] 97.5 °F (36.4 °C)  Heart Rate:  [68-89] 76  Resp:  [12-16] 16  BP: ()/(53-77) 105/60  Flow (L/min) (Oxygen Therapy):  [4-6] 4    Physical Exam    Constitutional: Awake, alert, no acute distress, pleasant   HENT: NCAT, mucous membranes moist   Respiratory: Clear to auscultation bilaterally, nonlabored respirations    Cardiovascular: RRR, no MRG   Gastrointestinal: Positive bowel sounds, soft, nontender, nondistended   Musculoskeletal: No bilateral ankle edema, no clubbing or cyanosis to extremities   Neurologic: Oriented x 3, strength symmetric in all extremities, Cranial Nerves grossly intact to confrontation, speech clear   Skin: No rashes     Result Review    I have personally reviewed the results from the time of this admission to 7/18/2025 14:00 EDT and agree with these  findings:  [x]  Laboratory personally reviewed CBC, BMP, BNP, blood sugars  CBC          10/18/2024    08:32 10/19/2024    10:15 7/18/2025    13:36   CBC   WBC 6.83  16.37  12.89    RBC 4.59  4.45  4.26    Hemoglobin 13.3  12.9  12.7    Hematocrit 39.7  38.6  38.4    MCV 86.5  86.7  90.1    MCH 29.0  29.0  29.8    MCHC 33.5  33.4  33.1    RDW 12.9  13.2  12.9    Platelets 302  309  256      CMP          3/21/2025    10:25 4/2/2025    08:10 7/18/2025    06:48   CMP   Glucose  177  181    BUN  31  33.0    Creatinine 1.10  1.20  1.09    EGFR 70.9  63.9  71.7    Sodium  134  141    Potassium  4.7  4.7    Chloride  100  108    Calcium  9.6  9.7    BUN/Creatinine Ratio  25.8  30.3    Anion Gap  10.1  8.4      []  Microbiology  []  Radiology  [x]  EKG/Telemetry Telemetry personally reviewed  []  Cardiology/Vascular   []  Pathology  []  Old records  []  Other:      Assessment & Plan   Assessment / Plan   Acute on chronic diastolic congestive heart failure with mild acute exacerbation  Acute hypoxic respiratory failure requiring 6 nasal cannula with tachypnea postoperatively  Status post left epididymectomy and hydrocelectomy  Hypertension  Type 2 diabetes mellitus  Hyperlipidemia  History of CVA  BPH  Osteoarthritis  GERD    Discussed with urology, will observe the patient in the hospital overnight  Chest x-ray obtained and personally reviewed showing slight pulmonary edema, concern for volume overload  Will dose Lasix 40 mg IV once and monitor response.  Trend renal function and electrolytes closely when IV diuretics  Start scheduled DuoNebs, Pulmicort and Brovana twice daily, albuterol as needed  Encourage incentive spirometry every hour while awake  Encourage ambulation, activity as tolerated  Wean O2 as tolerated keep sats greater than 90%  Start sliding scale insulin  Start scheduled stool softeners  Continue appropriate home medications  Trend renal function and electrolytes with a.m. BMP, magnesium   Trend Hgb and  WBC with a.m. CBC    Discussed case with: Bedside RN, urology        VTE Prophylaxis:  Mechanical VTE prophylaxis orders are present.        CODE STATUS:    Code Status (Patient has no pulse and is not breathing): CPR (Attempt to Resuscitate)  Medical Interventions (Patient has pulse or is breathing): Full Support

## 2025-07-18 NOTE — ANESTHESIA POSTPROCEDURE EVALUATION
Patient: Atilio Olson    Procedure Summary       Date: 07/18/25 Room / Location: [facility] OR 07 / [facility] MAIN OR    Anesthesia Start: 0800 Anesthesia Stop: 0932    Procedures:       EPIDIDYMECTOMY and hydrocelectomy, left (Left)      HYDROCELECTOMY (Left: Scrotum) Diagnosis:       Hydrocele in adult      Pain in left testicle      (Hydrocele in adult [N43.3])      (Pain in left testicle [N50.812])    Surgeons: Rupinder Hankins MD Provider: Milly Segundo MD    Anesthesia Type: general, MAC ASA Status: 3            Anesthesia Type: general, MAC    Vitals  Vitals Value Taken Time   /72 07/18/25 12:20   Temp 36.2 °C (97.2 °F) 07/18/25 12:20   Pulse 75 07/18/25 12:22   Resp 14 07/18/25 12:20   SpO2 88 % 07/18/25 12:22   Vitals shown include unfiled device data.        Post Anesthesia Care and Evaluation    Patient location during evaluation: bedside  Patient participation: complete - patient participated  Level of consciousness: awake  Pain management: adequate    Airway patency: patent  PONV Status: none  Cardiovascular status: acceptable and stable  Respiratory status: acceptable  Hydration status: acceptable

## 2025-07-18 NOTE — THERAPY EVALUATION
Respiratory Therapist Broncho-Pulmonary Hygiene Progress Note      Patient Name:  Atilio Olson  YOB: 1950    Atilio Olson meets the qualification for Level 1 of the Bronco-Pulmonary Hygiene Protocol. This was based on my daily patient assessment and includes review of chest x-ray results, cough ability and quality, oxygenation, secretions or risk for secretion development and patient mobility.     Broncho-Pulmonary Hygiene Assessment:    Level of Movement: Actively changing positions without assistance  Alert/ oriented/ cooperative    Breath Sounds: Clear to slightly diminished    Cough: Strong, effective    Chest X-Ray: Possible signs of consolidation and/or atelectasis or clear.     Sputum Productions: None or small amount of thin or watery secretions with effective cough    History and Physical: New onset of bronchitis or existing chronic pulmonary conditions.  **(not in an exacerbation)    SpO2 to Oxygen Need: greater than 92% on room air or  less than 3L nasal canula    Current SpO2 is: 92% on 3L    Based on this information, I have completed the following interventions: Teach/Instruct patient on cough and deep breathe      Electronically signed by Armida Sandoval RRT, 07/18/25, 3:37 PM EDT.

## 2025-07-19 VITALS
WEIGHT: 161.6 LBS | SYSTOLIC BLOOD PRESSURE: 98 MMHG | TEMPERATURE: 97.3 F | HEART RATE: 64 BPM | HEIGHT: 64 IN | OXYGEN SATURATION: 91 % | RESPIRATION RATE: 16 BRPM | BODY MASS INDEX: 27.59 KG/M2 | DIASTOLIC BLOOD PRESSURE: 83 MMHG

## 2025-07-19 LAB
ANION GAP SERPL CALCULATED.3IONS-SCNC: 8.4 MMOL/L (ref 5–15)
BASOPHILS # BLD AUTO: 0.06 10*3/MM3 (ref 0–0.2)
BASOPHILS NFR BLD AUTO: 0.4 % (ref 0–1.5)
BUN SERPL-MCNC: 34 MG/DL (ref 8–23)
BUN/CREAT SERPL: 25.6 (ref 7–25)
CALCIUM SPEC-SCNC: 8.9 MG/DL (ref 8.6–10.5)
CHLORIDE SERPL-SCNC: 105 MMOL/L (ref 98–107)
CO2 SERPL-SCNC: 25.6 MMOL/L (ref 22–29)
CREAT SERPL-MCNC: 1.33 MG/DL (ref 0.76–1.27)
DEPRECATED RDW RBC AUTO: 42.6 FL (ref 37–54)
EGFRCR SERPLBLD CKD-EPI 2021: 56.4 ML/MIN/1.73
EOSINOPHIL # BLD AUTO: 0.16 10*3/MM3 (ref 0–0.4)
EOSINOPHIL NFR BLD AUTO: 1 % (ref 0.3–6.2)
ERYTHROCYTE [DISTWIDTH] IN BLOOD BY AUTOMATED COUNT: 12.9 % (ref 12.3–15.4)
GLUCOSE BLDC GLUCOMTR-MCNC: 202 MG/DL (ref 70–99)
GLUCOSE SERPL-MCNC: 152 MG/DL (ref 65–99)
HCT VFR BLD AUTO: 34.2 % (ref 37.5–51)
HGB BLD-MCNC: 11.5 G/DL (ref 13–17.7)
IMM GRANULOCYTES # BLD AUTO: 0.06 10*3/MM3 (ref 0–0.05)
IMM GRANULOCYTES NFR BLD AUTO: 0.4 % (ref 0–0.5)
LYMPHOCYTES # BLD AUTO: 1.48 10*3/MM3 (ref 0.7–3.1)
LYMPHOCYTES NFR BLD AUTO: 9.6 % (ref 19.6–45.3)
MCH RBC QN AUTO: 30.4 PG (ref 26.6–33)
MCHC RBC AUTO-ENTMCNC: 33.6 G/DL (ref 31.5–35.7)
MCV RBC AUTO: 90.5 FL (ref 79–97)
MONOCYTES # BLD AUTO: 1.34 10*3/MM3 (ref 0.1–0.9)
MONOCYTES NFR BLD AUTO: 8.7 % (ref 5–12)
NEUTROPHILS NFR BLD AUTO: 12.26 10*3/MM3 (ref 1.7–7)
NEUTROPHILS NFR BLD AUTO: 79.9 % (ref 42.7–76)
NRBC BLD AUTO-RTO: 0 /100 WBC (ref 0–0.2)
PLATELET # BLD AUTO: 242 10*3/MM3 (ref 140–450)
PMV BLD AUTO: 8.9 FL (ref 6–12)
POTASSIUM SERPL-SCNC: 4.1 MMOL/L (ref 3.5–5.2)
QT INTERVAL: 424 MS
QTC INTERVAL: 437 MS
RBC # BLD AUTO: 3.78 10*6/MM3 (ref 4.14–5.8)
SODIUM SERPL-SCNC: 139 MMOL/L (ref 136–145)
WBC NRBC COR # BLD AUTO: 15.36 10*3/MM3 (ref 3.4–10.8)

## 2025-07-19 PROCEDURE — A9270 NON-COVERED ITEM OR SERVICE: HCPCS | Performed by: INTERNAL MEDICINE

## 2025-07-19 PROCEDURE — A9270 NON-COVERED ITEM OR SERVICE: HCPCS | Performed by: UROLOGY

## 2025-07-19 PROCEDURE — 63710000001 OXYCODONE-ACETAMINOPHEN 10-325 MG TABLET: Performed by: UROLOGY

## 2025-07-19 PROCEDURE — 63710000001 DOCUSATE SODIUM 100 MG CAPSULE: Performed by: UROLOGY

## 2025-07-19 PROCEDURE — 63710000001 INSULIN LISPRO (HUMAN) PER 5 UNITS: Performed by: INTERNAL MEDICINE

## 2025-07-19 PROCEDURE — 82948 REAGENT STRIP/BLOOD GLUCOSE: CPT

## 2025-07-19 PROCEDURE — 94664 DEMO&/EVAL PT USE INHALER: CPT

## 2025-07-19 PROCEDURE — 80048 BASIC METABOLIC PNL TOTAL CA: CPT | Performed by: UROLOGY

## 2025-07-19 PROCEDURE — 63710000001 GABAPENTIN 300 MG CAPSULE: Performed by: INTERNAL MEDICINE

## 2025-07-19 PROCEDURE — 85025 COMPLETE CBC W/AUTO DIFF WBC: CPT | Performed by: UROLOGY

## 2025-07-19 PROCEDURE — 63710000001 PANTOPRAZOLE 40 MG TABLET DELAYED-RELEASE: Performed by: INTERNAL MEDICINE

## 2025-07-19 PROCEDURE — 94761 N-INVAS EAR/PLS OXIMETRY MLT: CPT

## 2025-07-19 PROCEDURE — 94799 UNLISTED PULMONARY SVC/PX: CPT

## 2025-07-19 RX ORDER — IBUPROFEN 600 MG/1
1 TABLET ORAL
Status: DISCONTINUED | OUTPATIENT
Start: 2025-07-19 | End: 2025-07-19 | Stop reason: HOSPADM

## 2025-07-19 RX ORDER — NICOTINE POLACRILEX 4 MG
15 LOZENGE BUCCAL
Status: DISCONTINUED | OUTPATIENT
Start: 2025-07-19 | End: 2025-07-19 | Stop reason: HOSPADM

## 2025-07-19 RX ORDER — OXYCODONE AND ACETAMINOPHEN 5; 325 MG/1; MG/1
.5-2 TABLET ORAL EVERY 6 HOURS PRN
Qty: 16 TABLET | Refills: 0 | Status: SHIPPED | OUTPATIENT
Start: 2025-07-19

## 2025-07-19 RX ORDER — INSULIN LISPRO 100 [IU]/ML
3-14 INJECTION, SOLUTION INTRAVENOUS; SUBCUTANEOUS
Status: DISCONTINUED | OUTPATIENT
Start: 2025-07-19 | End: 2025-07-19 | Stop reason: HOSPADM

## 2025-07-19 RX ORDER — DEXTROSE MONOHYDRATE 25 G/50ML
25 INJECTION, SOLUTION INTRAVENOUS
Status: DISCONTINUED | OUTPATIENT
Start: 2025-07-19 | End: 2025-07-19 | Stop reason: HOSPADM

## 2025-07-19 RX ORDER — DOCUSATE SODIUM 100 MG/1
100 CAPSULE, LIQUID FILLED ORAL 2 TIMES DAILY
Qty: 30 CAPSULE | Refills: 0 | Status: SHIPPED | OUTPATIENT
Start: 2025-07-19

## 2025-07-19 RX ADMIN — PANTOPRAZOLE SODIUM 40 MG: 40 TABLET, DELAYED RELEASE ORAL at 08:11

## 2025-07-19 RX ADMIN — OXYCODONE HYDROCHLORIDE AND ACETAMINOPHEN 1 TABLET: 10; 325 TABLET ORAL at 02:56

## 2025-07-19 RX ADMIN — BUDESONIDE 0.5 MG: 0.5 SUSPENSION RESPIRATORY (INHALATION) at 07:27

## 2025-07-19 RX ADMIN — ARFORMOTEROL TARTRATE 15 MCG: 15 SOLUTION RESPIRATORY (INHALATION) at 07:27

## 2025-07-19 RX ADMIN — DOCUSATE SODIUM 100 MG: 100 CAPSULE, LIQUID FILLED ORAL at 08:11

## 2025-07-19 RX ADMIN — IPRATROPIUM BROMIDE AND ALBUTEROL SULFATE 3 ML: .5; 3 SOLUTION RESPIRATORY (INHALATION) at 07:27

## 2025-07-19 RX ADMIN — INSULIN LISPRO 4 UNITS: 100 INJECTION, SOLUTION INTRAVENOUS; SUBCUTANEOUS at 08:11

## 2025-07-19 RX ADMIN — GABAPENTIN 600 MG: 300 CAPSULE ORAL at 07:13

## 2025-07-19 RX ADMIN — Medication 10 ML: at 08:11

## 2025-07-19 RX ADMIN — OXYCODONE HYDROCHLORIDE AND ACETAMINOPHEN 1 TABLET: 10; 325 TABLET ORAL at 10:03

## 2025-07-19 NOTE — PLAN OF CARE
Goal Outcome Evaluation:      Education complete. Patient is discharging.

## 2025-07-19 NOTE — PROCEDURES
Walking Oximetry Progress Note      Patient Name:  Atliio Olson  YOB: 1950  Date of Procedure: 07/19/25              ROOM AIR BASELINE   SpO2% 92   Heart Rate 66     EXERCISE ON ROOM AIR SpO2% EXERCISE ON O2 LPM SpO2%   1 MINUTE 92 1 MINUTE     2 MINUTES 91 2 MINUTES     3 MINUTES 93 3 MINUTES     4 MINUTES 94 4 MINUTES     5 MINUTES 92 5 MINUTES     6 MINUTES 92 6 MINUTES                Time to Recovery  none   SpO2% Post Exercise  92 on RA.    HR Post Exercise  71     Comments:           Electronically signed by Mishel Torres, RRT, 07/19/25, 10:33 AM EDT.

## 2025-07-19 NOTE — PLAN OF CARE
Problem: Adult Inpatient Plan of Care  Goal: Plan of Care Review  Outcome: Progressing  Flowsheets  Taken 7/19/2025 0050 by Mary Nathan RN  Progress: improving  Outcome Evaluation: Pt still on 3 lpm O2, VSS Pain treated with both PO and IV meds. Dressing CDI  Taken 7/18/2025 1748 by Kathy Elliott RN  Plan of Care Reviewed With: patient  Goal: Patient-Specific Goal (Individualized)  Outcome: Progressing  Goal: Absence of Hospital-Acquired Illness or Injury  Outcome: Progressing  Intervention: Identify and Manage Fall Risk  Recent Flowsheet Documentation  Taken 7/18/2025 1924 by Mary Nathan RN  Safety Promotion/Fall Prevention:   safety round/check completed   nonskid shoes/slippers when out of bed   lighting adjusted   fall prevention program maintained  Intervention: Prevent and Manage VTE (Venous Thromboembolism) Risk  Recent Flowsheet Documentation  Taken 7/18/2025 1930 by Mary Nathan RN  VTE Prevention/Management: SCDs (sequential compression devices) on  Intervention: Prevent Infection  Recent Flowsheet Documentation  Taken 7/18/2025 1924 by Mary Nathan RN  Infection Prevention:   single patient room provided   rest/sleep promoted   hand hygiene promoted   environmental surveillance performed  Goal: Optimal Comfort and Wellbeing  Outcome: Progressing  Intervention: Provide Person-Centered Care  Recent Flowsheet Documentation  Taken 7/18/2025 1930 by Mary Nathan RN  Trust Relationship/Rapport:   care explained   choices provided   questions answered  Goal: Readiness for Transition of Care  Outcome: Progressing     Problem: Fall Injury Risk  Goal: Absence of Fall and Fall-Related Injury  Outcome: Progressing  Intervention: Promote Injury-Free Environment  Recent Flowsheet Documentation  Taken 7/18/2025 1924 by Mary Nathan RN  Safety Promotion/Fall Prevention:   safety round/check completed   nonskid shoes/slippers when out of bed   lighting adjusted   fall prevention program  maintained     Problem: Nausea and Vomiting  Goal: Nausea and Vomiting Relief  Outcome: Progressing     Problem: Surgery Nonspecified  Goal: Absence of Bleeding  Outcome: Progressing  Goal: Effective Bowel Elimination  Outcome: Progressing  Goal: Fluid and Electrolyte Balance  Outcome: Progressing  Goal: Blood Glucose Level Within Target Range  Outcome: Progressing  Goal: Absence of Infection Signs and Symptoms  Outcome: Progressing  Goal: Anesthesia/Sedation Recovery  Outcome: Progressing  Intervention: Optimize Anesthesia Recovery  Recent Flowsheet Documentation  Taken 7/18/2025 1924 by Mary Nathan RN  Safety Promotion/Fall Prevention:   safety round/check completed   nonskid shoes/slippers when out of bed   lighting adjusted   fall prevention program maintained  Goal: Optimal Pain Control and Function  Outcome: Progressing  Goal: Nausea and Vomiting Relief  Outcome: Progressing  Goal: Effective Urinary Elimination  Outcome: Progressing  Goal: Effective Oxygenation and Ventilation  Outcome: Progressing  Intervention: Optimize Oxygenation and Ventilation  Recent Flowsheet Documentation  Taken 7/18/2025 1930 by Mary Nathan, RN  Airway/Ventilation Management:   airway patency maintained   oxygen therapy provided     Problem: Comorbidity Management  Goal: Blood Glucose Level Within Target Range  Outcome: Progressing  Goal: Maintenance of Heart Failure Symptom Control  Outcome: Progressing  Goal: Blood Pressure in Desired Range  Outcome: Progressing   Goal Outcome Evaluation:           Progress: improving  Outcome Evaluation: Pt still on 3 lpm O2, VSS Pain treated with both PO and IV meds. Dressing CDI

## 2025-07-19 NOTE — DISCHARGE SUMMARY
[facility]   DISCHARGE SUMMARY      Name:  Atilio Olson   Age:  74 y.o.  Sex:  male  :  1950  MRN:  [id number]   Visit Number:  [id number]  Primary Care Physician:  Shay Vora MD  Date of Discharge:  2025  Admission Date:  2025      Discharge Diagnosis:       Active Hospital Problems    Diagnosis  POA    **Postoperative hypoxia [R09.02, Z98.890]  Yes    Hydrocele in adult [N43.3]  Unknown    Pain in left testicle [N50.812]  Unknown      Resolved Hospital Problems   No resolved problems to display.         Presenting Problem/History of Present Illness:    Hydrocele in adult [N43.3]  Pain in left testicle [N50.812]  Postoperative hypoxia [R09.02, Z98.890]         Hospital Course:    Patient underwent the below procedure.  He tolerated procedure well.  Please see operative report for further details.  Patient was admitted postoperatively for postoperative hypoxia and monitoring.  Hospitalist service was consulted; please see corresponding notes for details.  Patient remained stable during admission.  Quickly weaned off of O2.  Postoperatively he was able to ambulate as well as tolerate a diet.   At time of discharge patient was ambulating without assist, tolerating regular diet, and pain was controlled with p.o. medications.  Goals of inpatient care met patient is deemed ready for discharge home     Procedures Performed:    Procedure(s):  EPIDIDYMECTOMY and hydrocelectomy, left  HYDROCELECTOMY       Consults:     Consults       Date and Time Order Name Status Description    2025 10:53 AM Inpatient Hospitalist Consult              Pertinent Test Results:     Lab Results (all)       Procedure Component Value Units Date/Time    POC Glucose Once [[id number]]  (Abnormal) Collected: 25 08    Specimen: Blood Updated: 25 0807     Glucose 202 mg/dL      Comment: Serial Number: [id number]:  [id number]       Basic Metabolic Panel [[id number]]  (Abnormal) Collected:  07/19/25 0439    Specimen: Blood from Arm, Right Updated: 07/19/25 0511     Glucose 152 mg/dL      BUN 34.0 mg/dL      Creatinine 1.33 mg/dL      Sodium 139 mmol/L      Potassium 4.1 mmol/L      Chloride 105 mmol/L      CO2 25.6 mmol/L      Calcium 8.9 mg/dL      BUN/Creatinine Ratio 25.6     Anion Gap 8.4 mmol/L      eGFR 56.4 mL/min/1.73     Narrative:      GFR Categories in Chronic Kidney Disease (CKD)              GFR Category          GFR (mL/min/1.73)    Interpretation  G1                    90 or greater        Normal or high (1)  G2                    60-89                Mild decrease (1)  G3a                   45-59                Mild to moderate decrease  G3b                   30-44                Moderate to severe decrease  G4                    15-29                Severe decrease  G5                    14 or less           Kidney failure    (1)In the absence of evidence of kidney disease, neither GFR category G1 or G2 fulfill the criteria for CKD.    eGFR calculation 2021 CKD-EPI creatinine equation, which does not include race as a factor    CBC & Differential [[id number]]  (Abnormal) Collected: 07/19/25 0439    Specimen: Blood from Arm, Right Updated: 07/19/25 0453    Narrative:      The following orders were created for panel order CBC & Differential.  Procedure                               Abnormality         Status                     ---------                               -----------         ------                     CBC Auto Differential[[id number]]        Abnormal            Final result                 Please view results for these tests on the individual orders.    CBC Auto Differential [[id number]]  (Abnormal) Collected: 07/19/25 0439    Specimen: Blood from Arm, Right Updated: 07/19/25 0453     WBC 15.36 10*3/mm3      RBC 3.78 10*6/mm3      Hemoglobin 11.5 g/dL      Hematocrit 34.2 %      MCV 90.5 fL      MCH 30.4 pg      MCHC 33.6 g/dL      RDW 12.9 %      RDW-SD 42.6 fl      MPV 8.9 fL       Platelets 242 10*3/mm3      Neutrophil % 79.9 %      Lymphocyte % 9.6 %      Monocyte % 8.7 %      Eosinophil % 1.0 %      Basophil % 0.4 %      Immature Grans % 0.4 %      Neutrophils, Absolute 12.26 10*3/mm3      Lymphocytes, Absolute 1.48 10*3/mm3      Monocytes, Absolute 1.34 10*3/mm3      Eosinophils, Absolute 0.16 10*3/mm3      Basophils, Absolute 0.06 10*3/mm3      Immature Grans, Absolute 0.06 10*3/mm3      nRBC 0.0 /100 WBC     POC Glucose Once [[id number]]  (Abnormal) Collected: 07/18/25 2140    Specimen: Blood Updated: 07/18/25 2142     Glucose 260 mg/dL      Comment: Serial Number: [id number]:  [id number]       POC Glucose Once [[id number]]  (Abnormal) Collected: 07/18/25 1648    Specimen: Blood Updated: 07/18/25 1650     Glucose 338 mg/dL      Comment: Serial Number: [id number]:  [id number]       CBC & Differential [[id number]]  (Abnormal) Collected: 07/18/25 1336    Specimen: Blood from Hand, Right Updated: 07/18/25 1354    Narrative:      The following orders were created for panel order CBC & Differential.  Procedure                               Abnormality         Status                     ---------                               -----------         ------                     CBC Auto Differential[[id number]]        Abnormal            Final result                 Please view results for these tests on the individual orders.    CBC Auto Differential [[id number]]  (Abnormal) Collected: 07/18/25 1336    Specimen: Blood from Hand, Right Updated: 07/18/25 1354     WBC 12.89 10*3/mm3      RBC 4.26 10*6/mm3      Hemoglobin 12.7 g/dL      Hematocrit 38.4 %      MCV 90.1 fL      MCH 29.8 pg      MCHC 33.1 g/dL      RDW 12.9 %      RDW-SD 42.7 fl      MPV 9.1 fL      Platelets 256 10*3/mm3      Neutrophil % 93.9 %      Lymphocyte % 4.0 %      Monocyte % 1.4 %      Eosinophil % 0.1 %      Basophil % 0.3 %      Immature Grans % 0.3 %      Neutrophils, Absolute 12.10 10*3/mm3      Lymphocytes,  Absolute 0.52 10*3/mm3      Monocytes, Absolute 0.18 10*3/mm3      Eosinophils, Absolute 0.01 10*3/mm3      Basophils, Absolute 0.04 10*3/mm3      Immature Grans, Absolute 0.04 10*3/mm3      nRBC 0.0 /100 WBC     Extra Tubes [[id number]] Collected: 07/18/25 1336    Specimen: Blood from Hand, Right Updated: 07/18/25 1345    Narrative:      The following orders were created for panel order Extra Tubes.  Procedure                               Abnormality         Status                     ---------                               -----------         ------                     Green Top (Gel)[[id number]]                                  Final result                 Please view results for these tests on the individual orders.    Green Top (Gel) [[id number]] Collected: 07/18/25 1336    Specimen: Blood from Hand, Right Updated: 07/18/25 1345     Extra Tube Hold for add-ons.     Comment: Auto resulted.       BNP [[id number]]  (Normal) Collected: 07/18/25 1302    Specimen: Blood from Hand, Right Updated: 07/18/25 1325     proBNP <36.0 pg/mL     Narrative:      This assay is used as an aid in the diagnosis of individuals suspected of having heart failure. It can be used as an aid in the diagnosis of acute decompensated heart failure (ADHF) in patients presenting with signs and symptoms of ADHF to the emergency department (ED). In addition, NT-proBNP of <300 pg/mL indicates ADHF is not likely.    Age Range Result Interpretation  NT-proBNP Concentration (pg/mL:      <50             Positive            >450                   Gray                 300-450                    Negative             <300    50-75           Positive            >900                  Gray                300-900                  Negative            <300      >75             Positive            >1800                  Gray                300-1800                  Negative            <300    POC Glucose Once [[id number]]  (Abnormal) Collected: 07/18/25 1304     Specimen: Blood Updated: 07/18/25 1307     Glucose 245 mg/dL      Comment: Serial Number: [id number]:  [id number]       Tissue Pathology Exam [[id number]] Collected: 07/18/25 0835    Specimen: Skin from Scrotum Updated: 07/18/25 1100    Basic Metabolic Panel [[id number]]  (Abnormal) Collected: 07/18/25 0648    Specimen: Blood Updated: 07/18/25 0719     Glucose 181 mg/dL      BUN 33.0 mg/dL      Creatinine 1.09 mg/dL      Sodium 141 mmol/L      Potassium 4.7 mmol/L      Comment: Slight hemolysis detected by analyzer. Result may be falsely elevated.        Chloride 108 mmol/L      CO2 24.6 mmol/L      Calcium 9.7 mg/dL      BUN/Creatinine Ratio 30.3     Anion Gap 8.4 mmol/L      eGFR 71.7 mL/min/1.73     Narrative:      GFR Categories in Chronic Kidney Disease (CKD)              GFR Category          GFR (mL/min/1.73)    Interpretation  G1                    90 or greater        Normal or high (1)  G2                    60-89                Mild decrease (1)  G3a                   45-59                Mild to moderate decrease  G3b                   30-44                Moderate to severe decrease  G4                    15-29                Severe decrease  G5                    14 or less           Kidney failure    (1)In the absence of evidence of kidney disease, neither GFR category G1 or G2 fulfill the criteria for CKD.    eGFR calculation 2021 CKD-EPI creatinine equation, which does not include race as a factor            Imaging Results (All)       Procedure Component Value Units Date/Time    XR Chest 1 View [[id number]] Collected: 07/18/25 1329     Updated: 07/18/25 1334    Narrative:      XR CHEST 1 VW    Date of Exam: 7/18/2025 1:16 PM EDT    Indication: soa    Comparison: Chest CT 3/21/2025. Chest radiograph 2/19/2024.    Findings:      Mediastinum: Cardiac silhouette appears unchanged and top normal in size.    Lungs: There is prominence of central pulmonary vasculature with left greater than right  "perihilar interstitial and hazy opacities. Background emphysema.    Pleura: No visible pleural effusion or pneumothorax.    Bones and soft tissues: No acute, displaced fracture seen.        Impression:      Impression:  Findings suggestive of mild pulmonary edema. Viral/atypical infection can appear similar.    Emphysema    Electronically Signed: Sandeep Dinh MD    7/18/2025 1:32 PM EDT    Workstation ID: [id number]            Condition on Discharge:      good    Vital Signs:    /54 (BP Location: Right arm, Patient Position: Lying)   Pulse 65   Temp 98.1 °F (36.7 °C) (Oral)   Resp 18   Ht 162.6 cm (64\")   Wt 73.3 kg (161 lb 9.6 oz)   SpO2 92%   BMI 27.74 kg/m²     Discharge Disposition:    Home or Self Care    Discharge Medication:       Discharge Medications        New Medications        Instructions Start Date   docusate sodium 100 MG capsule  Commonly known as: Colace   100 mg, Oral, 2 Times Daily, May take as needed once having regular bowel movements      naloxone 4 MG/0.1ML nasal spray  Commonly known as: NARCAN   Call 911. Don't prime. [place] in 1 nostril for overdose. Repeat in 2-3 minutes in other nostril if no or minimal breathing/responsiveness.      oxyCODONE-acetaminophen 5-325 MG per tablet  Commonly known as: Percocet   0.5-2 tablets, Oral, Every 6 Hours PRN             Changes to Medications        Instructions Start Date   aspirin 81 MG EC tablet  What changed: additional instructions   81 mg, Oral, Daily, If bruising and swelling has stabilized   Start Date: July 20, 2025     clopidogrel 75 MG tablet  Commonly known as: PLAVIX  What changed:   additional instructions  These instructions start on July 21, 2025. If you are unsure what to do until then, ask your doctor or other care provider.   75 mg, Oral, Daily, Patient to resume if bruising and swelling has stabilized   Start Date: July 21, 2025            Continue These Medications        Instructions Start Date   atorvastatin 80 MG " tablet  Commonly known as: LIPITOR   80 mg, Nightly      baclofen 10 MG tablet  Commonly known as: LIORESAL   10 mg, Oral, 3 Times Daily      diclofenac 75 MG EC tablet  Commonly known as: VOLTAREN   75 mg, Oral, 2 Times Daily With Meals      FOLBEE PLUS PO   1 tablet, Daily      gabapentin 800 MG tablet  Commonly known as: NEURONTIN   800 mg, 3 Times Daily      loratadine 10 MG tablet  Commonly known as: CLARITIN   10 mg, Daily      losartan-HCTZ (HYZAAR) 100-12.5 combo dose   1 dose, Every Evening      metFORMIN 500 MG tablet  Commonly known as: GLUCOPHAGE   Take 1 tablet by mouth 2 (Two) Times a Day With Meals.      multivitamin with minerals tablet tablet   1 tablet, Daily      pantoprazole 40 MG EC tablet  Commonly known as: PROTONIX   40 mg, Every Morning Before Breakfast      temazepam 15 MG capsule  Commonly known as: RESTORIL   15 mg, Oral, Nightly      traMADol 50 MG tablet  Commonly known as: ULTRAM   50 mg, Oral, 3 times daily               Discharge Diet:         Activity at Discharge:     Activity Instructions       Discharge Activity      Do not drive or return to work while requiring narcotic pain medication.    No strenuous activity until follow-up.  No lifting over 25 pounds.  Walking and stairs okay.     Okay to resume aspirin on Sunday if bruising and swelling have stabilized.  Okay to resume Plavix Monday if bruising and swelling have stabilized.    Do NOT take over 4000 mg (4 g) of Tylenol (acetaminophen) in a 24-hour period.  The narcotic pain medication has 325 mg/tab.    May take ibuprofen as needed for additional discomfort.       Okay to shower in 24 hours; no soaking in water or tub baths. Wash with mild soap and water, pat incisions to dry.    Bruising, swelling, tenderness, redness is expected. Slight drainage from incision and scrotal swelling or possible.    Keep incision clean and dry.       Pain medication as well as anesthesia can cause constipation.  Take over the counter stool  softener if constipated.     Recommend cold therapy for the first 24 hours-apply ice to scrotal area, not directly on skin, 20 minutes on and 20 minutes off.     Maintain scrotal support for 48 hours at all times and then as desired for comfort.  Recommend elevation of scrotum when laying down in order to decrease swelling.     Call office if concern that incision is infected.     Call urology office with any questions or concerns.            Follow-up Appointments:    Future Appointments   Date Time Provider Department Center   7/30/2025  8:30 AM Rupinder Hankins MD [facility]   4/9/2026  9:30 AM Rupinder Hankins MD [facility]     Additional Instructions for the Follow-ups that You Need to Schedule       Discharge Follow-up with Specified Provider: Dr. Hankins; 2 Weeks   As directed      To: Dr. Hankins   Follow Up: 2 Weeks   Follow Up Details: [phone]                Test Results Pending at Discharge:    Pending Labs       Order Current Status    Tissue Pathology Exam In process               Rupinder Hankins MD  07/19/25  10:20 EDT

## 2025-07-30 ENCOUNTER — OFFICE VISIT (OUTPATIENT)
Dept: UROLOGY | Age: 75
End: 2025-07-30
Payer: MEDICARE

## 2025-07-30 VITALS — HEIGHT: 64 IN | WEIGHT: 161 LBS | BODY MASS INDEX: 27.49 KG/M2 | RESPIRATION RATE: 17 BRPM

## 2025-07-30 DIAGNOSIS — N43.3 HYDROCELE, UNSPECIFIED HYDROCELE TYPE: Primary | ICD-10-CM

## 2025-07-30 DIAGNOSIS — Z98.890 S/P UROLOGICAL SURGERY: ICD-10-CM

## 2025-07-30 NOTE — PROGRESS NOTES
UROLOGY OFFICE FOLLOW UP NOTE    Subjective   HPI  Atilio Olson is a 74 y.o. male.  Presents postop left epididymectomy and hydrocelectomy 7/18/2025.  History of BPH, chronic orchialgia, has had right epididymectomy, aqua ablation.  History of Present Illness    He reports a resolution of his previous pain but is experiencing soreness on the left side, which was the site of his recent surgery. He is uncertain if this discomfort is due to swelling or another cause. He also mentions a slight leakage from the surgical incision. He has been managing the area by keeping it clean and dry. He has been using a jockstrap for support, although he finds it somewhat inconvenient during bathroom visits. For pain management, he has been taking tramadol and feels that it is effective.  _______  Left epididymectomy and hydrocelectomy 7/18/2025    Pathology 7/18/2025: Hydrocele; benign epididymis    ________________  Scrotal ultrasound 3/8/2025: Asymmetrically smaller right testicle which is decreased in size from the prior outside ultrasound dated 9/9/2024. There is an area of slightly decreased echogenicity which appears to correspond to the lower pole of the right testicle measuring 2.2 x   1.7 x 1.9 cm. This likely represents sequelae of prior infection or surgery. An underlying testicular mass is also within the differential considerations but felt slightly less likely given the overall decrease in size of the right testicle. Recommend   urology follow-up.  2.Normal appearance of the left testicle.  3.Debris-filled left-sided hydrocele.     Epididymis pathology: - Benign epididymis and epididymal cysts      scrotal ultrasound 9/9/2024: Cystic replacement of the epididymal head on the right   with the largest of the cyst measuring 3.1 cm maximally. Bilateral   hydroceles, slightly more complex on the right than left. No   intratesticular abnormalities.    _____________  Prostate pathology, aqua ablation 5/6/2024: Benign  prostatic hyperplasia     ______________  Uroflow performed, consistent with obstructive uropathy, flow rate max 5 mL/s.     IPSS performed reveals severe bother score of 29.     CT abdomen pelvis with contrast 2/15/2024 performed prior to this visit.  Stable left rigid lip adenoma; right renal cyst, enlarged prostate gland; no urinary bladder thickening.  Fat-containing left inguinal hernia.  Prostate gland measured and calculated out to 110 g approximately.     Office cystoscopy 2/27/2024: Severe prostatomegaly with bilateral coapting lateral lobes, some varicosities, elongated prostatic urethra; no median lobe or significant intravesical prostate tissue.  Cystoscopy revealed normal capacity bladder which was decompressed upon entry, one right and left ureteral orifice in the normal anatomic position, normal bladder mucosa and no tumors, masses or stones.            Review of systems  A review of systems was performed, and positive findings are noted in the HPI.    Objective     Vital Signs:   There were no vitals taken for this visit.      Physical exam  No acute distress, well-nourished  Awake alert and oriented  Mood normal; affect normal    Physical Exam  Genitourinary: Scrotal incision healing well, no signs of infection or redness. Stitches mostly dissolved.    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 Hydrocele, unspecified hydrocele type (Primary)    2. S/P urological surgery        Assessment & Plan    The patient's condition is improving, with no signs of infection or redness at the incision site. The swelling is likely due to inflammation, which should subside over time. The scab appears slightly open, but there is no need for antibiotics as there are no signs of infection. An ultrasound is not deemed necessary at this point. He was advised to continue wearing the jockstrap for support, especially when standing for extended periods. However, he may switch to regular underwear when at home for comfort. He was instructed to contact the office immediately if his condition worsens or if there are any changes.      The patient is currently using tramadol for pain management and reports doing okay with it. No additional pain medication or refills were requested at this time. He was advised to keep the office informed if he needs further assistance    Follow-up  A follow-up appointment is scheduled for 3 to 4 weeks from now.           All questions addressed      Patient or patient representative verbalized consent for the use of Ambient Listening during the visit with  Rupinder Hankins MD for chart documentation. 7/30/2025  16:26 EDT

## 2025-08-06 ENCOUNTER — TRANSCRIBE ORDERS (OUTPATIENT)
Dept: ADMINISTRATIVE | Facility: HOSPITAL | Age: 75
End: 2025-08-06
Payer: MEDICARE

## 2025-08-06 DIAGNOSIS — K42.1 UMBILICAL HERNIA WITH GANGRENE AND OBSTRUCTION: Primary | ICD-10-CM

## 2025-08-12 ENCOUNTER — HOSPITAL ENCOUNTER (OUTPATIENT)
Dept: CT IMAGING | Facility: HOSPITAL | Age: 75
Discharge: HOME | End: 2025-08-12
Admitting: INTERNAL MEDICINE
Payer: MEDICARE

## 2025-08-12 DIAGNOSIS — K42.1 UMBILICAL HERNIA WITH GANGRENE AND OBSTRUCTION: ICD-10-CM

## 2025-08-12 PROCEDURE — 74177 CT ABD & PELVIS W/CONTRAST: CPT

## 2025-08-12 PROCEDURE — 25510000001 IOPAMIDOL PER 1 ML: Performed by: INTERNAL MEDICINE

## 2025-08-12 RX ORDER — IOPAMIDOL 755 MG/ML
100 INJECTION, SOLUTION INTRAVASCULAR
Status: COMPLETED | OUTPATIENT
Start: 2025-08-12 | End: 2025-08-12

## 2025-08-12 RX ADMIN — IOPAMIDOL 100 ML: 755 INJECTION, SOLUTION INTRAVENOUS at 08:32

## 2025-08-25 ENCOUNTER — TREATMENT (OUTPATIENT)
Dept: PHYSICAL THERAPY | Facility: CLINIC | Age: 75
End: 2025-08-25
Payer: MEDICARE

## 2025-08-25 DIAGNOSIS — M54.12 LEFT CERVICAL RADICULOPATHY: Primary | ICD-10-CM

## 2025-08-25 DIAGNOSIS — M54.2 PAIN, NECK: ICD-10-CM

## 2025-08-25 DIAGNOSIS — M43.6 STIFFNESS OF NECK: ICD-10-CM

## 2025-08-25 PROCEDURE — 97161 PT EVAL LOW COMPLEX 20 MIN: CPT | Performed by: PHYSICAL THERAPIST

## 2025-08-25 PROCEDURE — 97110 THERAPEUTIC EXERCISES: CPT | Performed by: PHYSICAL THERAPIST

## 2025-08-26 ENCOUNTER — OFFICE VISIT (OUTPATIENT)
Dept: UROLOGY | Age: 75
End: 2025-08-26
Payer: MEDICARE

## 2025-08-26 VITALS — HEIGHT: 64 IN | WEIGHT: 161 LBS | BODY MASS INDEX: 27.49 KG/M2

## 2025-08-26 DIAGNOSIS — N40.1 BENIGN PROSTATIC HYPERPLASIA WITH WEAK URINARY STREAM: ICD-10-CM

## 2025-08-26 DIAGNOSIS — T81.30XA WOUND DEHISCENCE: ICD-10-CM

## 2025-08-26 DIAGNOSIS — R39.12 BENIGN PROSTATIC HYPERPLASIA WITH WEAK URINARY STREAM: ICD-10-CM

## 2025-08-26 DIAGNOSIS — Z87.438 S/P REPAIR OF HYDROCELE: Primary | ICD-10-CM

## 2025-08-26 DIAGNOSIS — N50.812 PAIN IN LEFT TESTICLE: ICD-10-CM

## 2025-08-26 DIAGNOSIS — Z98.890 S/P REPAIR OF HYDROCELE: Primary | ICD-10-CM

## (undated) DEVICE — SLV SCD KN/LEN ADJ EXPRSS BLENDED MD 1P/U

## (undated) DEVICE — SLV SCD LEG COMFORT KENDALLSCD SM REPROC

## (undated) DEVICE — CYSTO/BLADDER IRRIGATION SET, REGULATING CLAMP

## (undated) DEVICE — SYS CLOSE PORTII CARTR/THOMASN XL

## (undated) DEVICE — THE STERILE LIGHT HANDLE COVER IS USED WITH STERIS SURGICAL LIGHTING AND VISUALIZATION SYSTEMS.

## (undated) DEVICE — GLV SURG SENSICARE PI LF PF 7.5 GRN STRL

## (undated) DEVICE — LAPAROSCOPIC SCISSORS: Brand: EPIX LAPAROSCOPIC SCISSORS

## (undated) DEVICE — MAJOR-LF: Brand: MEDLINE INDUSTRIES, INC.

## (undated) DEVICE — SUT GUT CHRM 3/0 SH 27IN G122H

## (undated) DEVICE — INTENDED FOR TISSUE SEPARATION, AND OTHER PROCEDURES THAT REQUIRE A SHARP SURGICAL BLADE TO PUNCTURE OR CUT.: Brand: BARD-PARKER ® CARBON RIB-BACK BLADES

## (undated) DEVICE — STERILE POLYISOPRENE POWDER-FREE SURGICAL GLOVES: Brand: PROTEXIS

## (undated) DEVICE — SYR LL TP 10ML STRL

## (undated) DEVICE — CATH F5INF TLPIGST145 110CM6SH: Brand: INFINITI

## (undated) DEVICE — STY CATH CRV 6F

## (undated) DEVICE — GLOVE,SURG,SENSICARE SLT,LF,PF,7: Brand: MEDLINE

## (undated) DEVICE — GOWN,REINFORCE,POLY,SIRUS,BREATH SLV,XLG: Brand: MEDLINE

## (undated) DEVICE — SUT SILK 2/0 TIES 18IN A185H

## (undated) DEVICE — SUT GUT CHRM 2/0 SH 27IN G123H

## (undated) DEVICE — CYSTO PACK: Brand: MEDLINE INDUSTRIES, INC.

## (undated) DEVICE — GW INQWIRE FC PTFE STD J/1.5 .035 260

## (undated) DEVICE — SOL IRRG H2O BG 3000ML STRL

## (undated) DEVICE — SYR LUERLOK 30CC

## (undated) DEVICE — TRY SKINPREP PVP SCRB W PAINT

## (undated) DEVICE — CATH LAB PACK: Brand: MEDLINE INDUSTRIES, INC.

## (undated) DEVICE — Y-TYPE TUR/BLADDER IRRIGATION SET, REGULATING CLAMP

## (undated) DEVICE — BAG,DRAINAGE,4L,A/R TOWER,METAL CLAMP: Brand: MEDLINE

## (undated) DEVICE — TOWEL,OR,DSP,ST,BLUE,STD,4/PK,20PK/CS: Brand: MEDLINE

## (undated) DEVICE — SHEET,DRAPE,70X85,STERILE: Brand: MEDLINE

## (undated) DEVICE — ANTIBACTERIAL UNDYED BRAIDED (POLYGLACTIN 910), SYNTHETIC ABSORBABLE SUTURE: Brand: COATED VICRYL

## (undated) DEVICE — DRSNG TELFA PAD NONADH STR 1S 3X4IN

## (undated) DEVICE — Device

## (undated) DEVICE — GLIDESHEATH SLENDER STAINLESS STEEL KIT: Brand: GLIDESHEATH SLENDER

## (undated) DEVICE — STERILE POLYISOPRENE POWDER-FREE SURGICAL GLOVES WITH EMOLLIENT COATING: Brand: PROTEXIS

## (undated) DEVICE — PK DRP AQUABEAM LITHO 65X69IN

## (undated) DEVICE — DAVINCI-LF: Brand: MEDLINE INDUSTRIES, INC.

## (undated) DEVICE — SPNG GZ WOVN 8PLY 4X4IN LF STRL PK/2

## (undated) DEVICE — Device: Brand: AQUABEAM HANDPIECE

## (undated) DEVICE — SYR CATH/TIP 50ML 2OZ STRL 1P/U

## (undated) DEVICE — GLV SURG SENSICARE PI ORTHO SZ8 LF STRL

## (undated) DEVICE — STANDARD HYPODERMIC NEEDLE,POLYPROPYLENE HUB: Brand: MONOJECT

## (undated) DEVICE — NON-WOVEN ADHESIVE WOUND DRESSING: Brand: PRIMAPORE ADHESIVE WOUND DRSG 7.2*5CM

## (undated) DEVICE — SUT VIC 0/0 UR6 27IN DYED J603H

## (undated) DEVICE — COVER,MAYO STAND,STERILE: Brand: MEDLINE

## (undated) DEVICE — DRP MICROSCP LECIA W/CLEARLENS 137X381CM

## (undated) DEVICE — VAGINAL PREP TRAY: Brand: MEDLINE INDUSTRIES, INC.

## (undated) DEVICE — GAMMEX® NON-LATEX SIZE 7.5, STERILE NEOPRENE POWDER-FREE SURGICAL GLOVE: Brand: GAMMEX

## (undated) DEVICE — CATH FOL BARDEX 2WY 18F 30CC

## (undated) DEVICE — 40585 XL ADVANCED TRENDELENBURG POSITIONING KIT: Brand: 40585 XL ADVANCED TRENDELENBURG POSITIONING KIT

## (undated) DEVICE — MEDI-VAC NON-CONDUCTIVE SUCTION TUBING: Brand: CARDINAL HEALTH

## (undated) DEVICE — STRIP CLS WND SUTURESTRIP/PLS 0.5X4IN TP1103

## (undated) DEVICE — PENCL SMOKE/EVAC MEGADYNE TELESCP 10FT

## (undated) DEVICE — CONTAINER,SPEC,PNEUM TUBE,4OZ,STRL PATH: Brand: MEDLINE

## (undated) DEVICE — ARM DRAPE

## (undated) DEVICE — SEAL

## (undated) DEVICE — RADIFOCUS OPTITORQUE ANGIOGRAPHIC CATHETER: Brand: OPTITORQUE

## (undated) DEVICE — HF-RESECTION ELECTRODE PLASMABAND BAND, MEDIUM, 24 FR., 12°/16°, PK TURIS: Brand: OLYMPUS

## (undated) DEVICE — GOWN,SIRUS,POLYRNF,BRTHSLV,2XL,18/CS: Brand: MEDLINE

## (undated) DEVICE — PENCL ES MEGADINE EZ/CLEAN BUTN W/HOLSTR 10FT

## (undated) DEVICE — SUT MERSILENE POLYSTR CT1 BR 0 75CM GRN

## (undated) DEVICE — SYRINGE,TOOMEY,IRRIGATION,70CC,STERILE: Brand: MEDLINE

## (undated) DEVICE — RADIFOCUS GLIDEWIRE: Brand: GLIDEWIRE

## (undated) DEVICE — PENCL E/S HNDSWCH ROCKR CB

## (undated) DEVICE — TRAY,ADD A CATH,FOLEY,DRAIN BG,10ML SYR: Brand: MEDLINE

## (undated) DEVICE — ENDOPATH XCEL WITH OPTIVIEW TECHNOLOGY BLADELESS TROCARS WITH STABILITY SLEEVES: Brand: ENDOPATH XCEL OPTIVIEW

## (undated) DEVICE — SOL IRR NACL 0.9PCT 3000ML

## (undated) DEVICE — SUT MNCRYL PLS ANTIB UD 4/0 PS2 18IN

## (undated) DEVICE — ATHLETIC SUPPORTER LATEX FREE, XLARGE

## (undated) DEVICE — TIP COVER ACCESSORY

## (undated) DEVICE — MICRO HVTSA, 0.5G AND HVTSA SOURCEMARK PRODUCT CODE M1206 AND M1206-01: Brand: EXOFIN MICRO HVTSA, 0.5G

## (undated) DEVICE — LAPAROVUE VISIBILITY SYSTEM LAPAROSCOPIC SOLUTIONS: Brand: LAPAROVUE

## (undated) DEVICE — TRAY,IRRIGATION,PISTON SYRINGE,60ML: Brand: MEDLINE

## (undated) DEVICE — MAT FLR ABS W/BLU/LINER 56X72IN WHT

## (undated) DEVICE — SKIN PREP TRAY W/CHG: Brand: MEDLINE INDUSTRIES, INC.

## (undated) DEVICE — CATH FOLEY LUBRICATH 3WY 22F 30CC

## (undated) DEVICE — LAMINECTOMY CERVICAL DISC-LF: Brand: MEDLINE INDUSTRIES, INC.

## (undated) DEVICE — GLV SURG BIOGEL LTX PF 7 1/2

## (undated) DEVICE — SOL IRR NACL 0.9PCT BO 1000ML

## (undated) DEVICE — 1LYRTR 16FR10ML100%SIL UMS SNP: Brand: MEDLINE INDUSTRIES, INC.